# Patient Record
Sex: FEMALE | Race: BLACK OR AFRICAN AMERICAN | Employment: OTHER | URBAN - METROPOLITAN AREA
[De-identification: names, ages, dates, MRNs, and addresses within clinical notes are randomized per-mention and may not be internally consistent; named-entity substitution may affect disease eponyms.]

---

## 2017-01-24 ENCOUNTER — OFFICE VISIT (OUTPATIENT)
Dept: FAMILY MEDICINE CLINIC | Age: 72
End: 2017-01-24

## 2017-01-24 VITALS
HEART RATE: 82 BPM | HEIGHT: 63 IN | BODY MASS INDEX: 32.43 KG/M2 | RESPIRATION RATE: 16 BRPM | OXYGEN SATURATION: 98 % | TEMPERATURE: 97.6 F | WEIGHT: 183 LBS | DIASTOLIC BLOOD PRESSURE: 78 MMHG | SYSTOLIC BLOOD PRESSURE: 134 MMHG

## 2017-01-24 DIAGNOSIS — I10 ESSENTIAL HYPERTENSION: Primary | ICD-10-CM

## 2017-01-24 DIAGNOSIS — Z91.09 ENVIRONMENTAL ALLERGIES: ICD-10-CM

## 2017-01-24 DIAGNOSIS — Z51.81 ENCOUNTER FOR MEDICATION MONITORING: ICD-10-CM

## 2017-01-24 DIAGNOSIS — E78.2 MIXED HYPERLIPIDEMIA: ICD-10-CM

## 2017-01-24 DIAGNOSIS — E11.9 TYPE 2 DIABETES MELLITUS WITHOUT COMPLICATION, WITHOUT LONG-TERM CURRENT USE OF INSULIN (HCC): ICD-10-CM

## 2017-01-24 DIAGNOSIS — Z23 ENCOUNTER FOR IMMUNIZATION: ICD-10-CM

## 2017-01-24 LAB
BILIRUB UR QL STRIP: NEGATIVE
GLUCOSE POC: 251 MG/DL
GLUCOSE UR-MCNC: NORMAL MG/DL
HBA1C MFR BLD HPLC: 6.9 %
KETONES P FAST UR STRIP-MCNC: NEGATIVE MG/DL
PH UR STRIP: 5 [PH] (ref 4.6–8)
PROT UR QL STRIP: NEGATIVE MG/DL
SP GR UR STRIP: 1.02 (ref 1–1.03)
UA UROBILINOGEN AMB POC: NORMAL (ref 0.2–1)
URINALYSIS CLARITY POC: CLEAR
URINALYSIS COLOR POC: YELLOW
URINE BLOOD POC: NEGATIVE
URINE LEUKOCYTES POC: NEGATIVE
URINE NITRITES POC: NEGATIVE

## 2017-01-24 RX ORDER — FLUCONAZOLE 100 MG/1
TABLET ORAL
Refills: 0 | COMMUNITY
Start: 2017-01-19 | End: 2017-01-24 | Stop reason: ALTCHOICE

## 2017-01-24 RX ORDER — CETIRIZINE HCL 10 MG
10 TABLET ORAL
Qty: 30 TAB | Refills: 1
Start: 2017-01-24 | End: 2017-03-20

## 2017-01-24 RX ORDER — AMLODIPINE BESYLATE 10 MG/1
5 TABLET ORAL DAILY
COMMUNITY
End: 2019-05-06 | Stop reason: DRUGHIGH

## 2017-01-24 RX ORDER — FLUCONAZOLE 100 MG/1
100 TABLET ORAL DAILY
COMMUNITY
End: 2017-03-20 | Stop reason: ALTCHOICE

## 2017-01-24 NOTE — PROGRESS NOTES
HISTORY OF PRESENT ILLNESS  Cira Atwood is a 80 y.o. female. HPI   Follow up on BP, cholesterol and BS. Overall feeling well. HTN follow up:  Compliant w/ meds, low salt diet, and exercise. No home bp monitoring. No swelling, headache or dizziness. No chest pain, SOB, palpitations. Otherwise feeling well since the last visit. DM type II follow up:  Compliant w/ meds, diabetic diet, and exercise. Last a1c level was 6.5% in 12/15 and 6.8% in May 2016.  a1c was 7.1% in 10/16. Obtains home glucose monitoring averaging 100-140. Checks BS BID on most days and prn. Pt does not have BS log at visit today. No Rf needed for today. Denies any tingling sensation, polyuria and polydipsia. No blurred vision. No significant weight changes. Feeling well since last OV. Hypercholesterolemia follow up:  Compliant w/ low fat, low cholesterol diet. Exercising some. Currently not on medication. 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  fluconazole (DIFLUCAN) 100 mg tablet Take 100 mg by mouth daily. FDA advises cautious prescribing of oral fluconazole in pregnancy.  cetirizine (ZYRTEC) 10 mg tablet Take 1 Tab by mouth daily as needed for Allergies.  30 Tab 1    TRUE METRIX GLUCOSE TEST STRIP strip TEST BLOOD SUGAR TWICE DAILY  AND AS NEEDED 250 Strip 3    losartan-hydroCHLOROthiazide (HYZAAR) 100-12.5 mg per tablet TAKE 1 TABLET EVERY DAY 90 Tab 3    metFORMIN (GLUCOPHAGE) 500 mg tablet Take 2 tabs every morning and take 2 tabs with dinner 360 Tab 3    albuterol (PROVENTIL HFA, VENTOLIN HFA, PROAIR HFA) 90 mcg/actuation inhaler Take 2 Puffs by inhalation every four (4) hours as needed for Wheezing. 1 Inhaler 0    guaiFENesin-codeine (ROBITUSSIN AC) 100-10 mg/5 mL solution Take 10 mL by mouth three (3) times daily as needed for Cough. Max Daily Amount: 30 mL. 236 mL 0    meloxicam (MOBIC) 15 mg tablet Take 15 mg by mouth as needed for Pain.  aspirin 81 mg chewable tablet Take 81 mg by mouth daily.  loratadine (CLARITIN) 10 mg tablet Take 1 tablet by mouth daily as needed for Allergies. 30 tablet 11    Lancets (PRODIGY TWIST TOP LANCET) misc Use to check blood glucose level twice a day and PRN. 200 Each 3    alcohol swabs (BD SINGLE USE SWABS REGULAR) padm Use to cleanse finger prior to checking glucose level. 200 Each 3    Cholecalciferol, Vitamin D3, (VITAMIN D3) 5,000 unit Tab Take 1 Cap by mouth daily.  Omega-3-DHA-EPA-Fish Oil 1,000 (120-180) mg Cap Take 2 capsules by mouth daily.          No Known Allergies    Past Medical History   Diagnosis Date    Arthritis      knee    Cancer ([address] 2003     melanoma -right lower extremity    Chronic pain      left knee    Diabetes ([facility]      type 2    Hypertension     Nausea & vomiting     Screen for colon cancer 10/2/2014       Past Surgical History   Procedure Laterality Date    Hx wisdom teeth extraction      Hx hysterectomy  1990     fibroid    Pr colonoscopy flx dx w/collj spec when pfrmd  2007     date and md unknown    Hx knee replacement      Hx cholecystectomy      Hx other surgical  2005     permanent teeth replacements    Hx other surgical Right 2003     melonoma removed rt leg    Hx cataract removal  2010     bilateral eyes    Hx knee arthroscopy  1999     left knee replacement    Hx orthopaedic  9/11/2015     left wrist surgery    Hx breast biopsy Right      yrs ago       Family History   Problem Relation Age of Onset    No Known Problems Mother     No Known Problems Father     Thyroid Disease Sister        Social History   Substance Use Topics    Smoking status: Never Smoker    Smokeless tobacco: Never Used    Alcohol use No          Lab Results  Component Value Date/Time   WBC 5.7 10/22/2016 12:46 PM   Hemoglobin (POC) 15.3 12/27/2009 07:16 AM   HGB 13.3 10/22/2016 12:46 PM   Hematocrit (POC) 45 12/27/2009 07:16 AM   HCT 39.3 10/22/2016 12:46 PM   PLATELET 893 28/25/6924 12:46 PM   MCV 91.0 10/22/2016 12:46 PM       Lab Results  Component Value Date/Time   Cholesterol, total 198 05/23/2016 11:01 AM   HDL Cholesterol 68 05/23/2016 11:01 AM   LDL, calculated 109 05/23/2016 11:01 AM   Triglyceride 105 05/23/2016 11:01 AM   CHOL/HDL Ratio 4.3 12/27/2009 07:40 AM       Lab Results   Component Value Date/Time    Sodium 139 10/22/2016 12:46 PM    Potassium 3.9 10/24/2016 09:00 AM    Chloride 101 10/22/2016 12:46 PM    CO2 29 10/22/2016 12:46 PM    Anion gap 9 10/22/2016 12:46 PM    Glucose 231 10/22/2016 12:46 PM    BUN 10 10/22/2016 12:46 PM    Creatinine 0.69 10/22/2016 12:46 PM    BUN/Creatinine ratio 14 10/22/2016 12:46 PM    GFR est AA >60 10/22/2016 12:46 PM    GFR est non-AA >60 10/22/2016 12:46 PM    Calcium 8.6 10/22/2016 12:46 PM    Bilirubin, total 0.5 10/22/2016 12:46 PM    ALT 22 10/22/2016 12:46 PM    AST 18 10/22/2016 12:46 PM    Alk. phosphatase 73 10/22/2016 12:46 PM    Protein, total 7.9 10/22/2016 12:46 PM    Albumin 3.7 10/22/2016 12:46 PM    Globulin 4.2 10/22/2016 12:46 PM    A-G Ratio 0.9 10/22/2016 12:46 PM      Lab Results   Component Value Date/Time    Hemoglobin A1c 6.8 02/24/2014 09:00 AM    Hemoglobin A1c 7.0 05/11/2012 10:28 AM    Hemoglobin A1c (POC) 7.1 10/24/2016 09:00 AM         Review of Systems   Constitutional: Negative for malaise/fatigue. HENT: Negative for congestion. Eyes: Negative for blurred vision. Respiratory: Negative for cough and shortness of breath.     Cardiovascular: Negative for chest pain, palpitations and leg swelling. Gastrointestinal: Negative for abdominal pain, constipation and heartburn. Genitourinary: Negative for dysuria, frequency and urgency. Musculoskeletal: Negative for back pain and joint pain. Neurological: Negative for dizziness, tingling and headaches. Endo/Heme/Allergies: Positive for environmental allergies. Psychiatric/Behavioral: Negative for depression. The patient does not have insomnia. Physical Exam   Constitutional: She appears well-developed and well-nourished. /78  Pulse 82  Temp 97.6 °F (36.4 °C) (Oral)   Resp 16  Ht 5' 3\" (1.6 m)  Wt 183 lb (83 kg)  LMP 05/10/1990  SpO2 98%  BMI 32.42 kg/m2     HENT:   Right Ear: Tympanic membrane and ear canal normal.   Left Ear: Tympanic membrane and ear canal normal.   Nose: No mucosal edema or rhinorrhea. Mouth/Throat: Oropharynx is clear and moist and mucous membranes are normal.   Neck: Normal range of motion. Neck supple. No thyromegaly present. Cardiovascular: Normal rate and regular rhythm. No murmur heard. Pulmonary/Chest: Effort normal and breath sounds normal.   Abdominal: Soft. Bowel sounds are normal. There is no tenderness. Musculoskeletal: Normal range of motion. She exhibits no edema. Lymphadenopathy:     She has no cervical adenopathy. Skin: Skin is warm and dry. Psychiatric: She has a normal mood and affect. Nursing note and vitals reviewed. ASSESSMENT and PLAN  Cathie Thomas was seen today for hypertension, diabetes and nasal congestion. Diagnoses and all orders for this visit:    Essential hypertension  Discussed sodium restriction, high k rich diet, maintaining ideal body weight and regular exercise program such as daily walking 30 min perday 4-5 times per week, as physiologic means to achieve blood pressure control.  Medication compliance advised.      Type 2 diabetes mellitus without complication, without long-term current use of insulin (HCC)  A1C is improved to 6.9%.  but admits to having 2 cups of yogurt last night and some oranges she had cut up. Discussed diet and decreasing carbs with her snack.    -     AMB POC HEMOGLOBIN A1C  -     AMB POC GLUCOSE, QUANTITATIVE, BLOOD  -     MICROALBUMIN, UR, RAND W/ MICROALBUMIN/CREA RATIO  -     AMB POC URINALYSIS DIP STICK AUTO W/O MICRO    Mixed hyperlipidemia  -     LIPID PANEL    Encounter for medication monitoring  -     METABOLIC PANEL, BASIC    Environmental allergies  -     cetirizine (ZYRTEC) 10 mg tablet; Take 1 Tab by mouth daily as needed for Allergies. Follow-up Disposition:  Return in about 3 months (around 4/24/2017) for physical.  reviewed diet, exercise and weight control  cardiovascular risk and specific lipid/LDL goals reviewed  reviewed medications and side effects in detail  specific diabetic recommendations: low cholesterol diet, weight control and daily exercise discussed, home glucose monitoring emphasized, all medications, side effects and compliance discussed carefully, foot care discussed and Podiatry visits discussed, annual eye examinations at Ophthalmology discussed and glycohemoglobin and other lab monitoring discussed     I have discussed diagnosis listed in this note with pt and/or family. I have discussed treatment plans and options and the risk/benefit analysis of those options, including safe use of medications and possible medication side effects. Through the use of shared decision making we have agreed to the above plan. The patient has received an after-visit summary and questions were answered concerning future plans and follow up. Advise pt of any urgent changes then to proceed to the ER.

## 2017-01-24 NOTE — PROGRESS NOTES
Chief Complaint   Patient presents with    Hypertension     follow up    Diabetes     follow up    Nasal Congestion       Microalbumin 7/14/2015    Mammogram 8/31/2016    Colonoscopy 3/4/2016 by Dr. Birdie Eaton- repeat in 10 years. Pt states she has an eye exam 1/11/2016 with Dr. Chandrika Bragg. Verbal order received by Dr. Devine  dose flu vaccine IM. Pt received high dose flu vaccine IM in left deltoid without any difficulty.

## 2017-01-24 NOTE — MR AVS SNAPSHOT
Visit Information Date & Time Provider Department Dept. Phone Encounter #  
 1/24/2017  8:00 AM Marcheta Krabbe, MD [facility] [phone] [id number] Follow-up Instructions Return in about 3 months (around 4/24/2017) for physical.  
  
Upcoming Health Maintenance Date Due  
 EYE EXAM RETINAL OR DILATED Q1 10/9/2015 MICROALBUMIN Q1 7/14/2016 INFLUENZA AGE 9 TO ADULT 8/1/2016 MEDICARE YEARLY EXAM 12/22/2016 FOOT EXAM Q1 12/22/2016 HEMOGLOBIN A1C Q6M 4/24/2017 LIPID PANEL Q1 5/23/2017 GLAUCOMA SCREENING Q2Y 1/11/2018 BREAST CANCER SCRN MAMMOGRAM 8/31/2018 DTaP/Tdap/Td series (2 - Td) 2/10/2022 COLONOSCOPY 3/4/2026 Allergies as of 1/24/2017  Review Complete On: 1/24/2017 By: Dany Harrington LPN No Known Allergies Current Immunizations  Reviewed on 5/23/2016 Name Date Influenza High Dose Vaccine PF 12/22/2015 Influenza Vaccine 10/15/2014, 10/21/2013 Influenza Vaccine Split 12/17/2012 Pneumococcal Conjugate (PCV-13) 7/14/2015 Pneumococcal Vaccine (Unspecified Type) 2/10/2012 TDAP Vaccine 2/10/2012 Not reviewed this visit You Were Diagnosed With   
  
 Codes Comments Essential hypertension    -  Primary ICD-10-CM: I10 
ICD-9-CM: 401.9 Type 2 diabetes mellitus without complication, without long-term current use of insulin (HCC)     ICD-10-CM: E11.9 ICD-9-CM: 250.00 Mixed hyperlipidemia     ICD-10-CM: E78.2 ICD-9-CM: 272.2 Encounter for medication monitoring     ICD-10-CM: Z51.81 
ICD-9-CM: V58.83 Environmental allergies     ICD-10-CM: Z91.09 
ICD-9-CM: V15.09 Vitals BP Pulse Temp Resp Height(growth percentile) Weight(growth percentile) 134/78 82 97.6 °F (36.4 °C) (Oral) 16 5' 3\" (1.6 m) 183 lb (83 kg) LMP SpO2 BMI OB Status Smoking Status 05/10/1990 98% 32.42 kg/m2 Hysterectomy Never Smoker Vitals History BMI and BSA Data Body Mass Index Body Surface Area  
 32.42 kg/m 2 1.92 m 2 Preferred Pharmacy Pharmacy Name Phone [facility] [address]  [place] [phone] Your Updated Medication List  
  
   
This list is accurate as of: 1/24/17  9:15 AM.  Always use your most recent med list.  
  
  
  
  
 albuterol 90 mcg/actuation inhaler Commonly known as:  PROVENTIL HFA, VENTOLIN HFA, PROAIR HFA Take 2 Puffs by inhalation every four (4) hours as needed for Wheezing. alcohol swabs Padm Commonly known as:  BD Single Use Swabs Regular Use to cleanse finger prior to checking glucose level. aspirin 81 mg chewable tablet Take 81 mg by mouth daily. cetirizine 10 mg tablet Commonly known as:  ZYRTEC Take 1 Tab by mouth daily as needed for Allergies. CLARITIN 10 mg tablet Generic drug:  loratadine Take 1 tablet by mouth daily as needed for Allergies. fluconazole 100 mg tablet Commonly known as:  DIFLUCAN Take 100 mg by mouth daily. FDA advises cautious prescribing of oral fluconazole in pregnancy. guaiFENesin-codeine 100-10 mg/5 mL solution Commonly known as:  ROBITUSSIN AC Take 10 mL by mouth three (3) times daily as needed for Cough. Max Daily Amount: 30 mL. Lancets Misc Commonly known as:  PRODIGY TWIST TOP LANCET Use to check blood glucose level twice a day and PRN. losartan-hydroCHLOROthiazide 100-12.5 mg per tablet Commonly known as:  HYZAAR  
TAKE 1 TABLET EVERY DAY  
  
 meloxicam 15 mg tablet Commonly known as:  MOBIC Take 15 mg by mouth as needed for Pain.  
  
 metFORMIN 500 mg tablet Commonly known as:  GLUCOPHAGE Take 2 tabs every morning and take 2 tabs with dinner NORVASC 10 mg tablet Generic drug:  amLODIPine Take 5 mg by mouth daily. Omega-3-DHA-EPA-Fish Oil 1,000 mg (120 mg-180 mg) Cap Take 2 capsules by mouth daily. TRUE METRIX GLUCOSE TEST STRIP strip Generic drug:  glucose blood VI test strips TEST BLOOD SUGAR TWICE DAILY  AND AS NEEDED  
  
 VITAMIN D3 5,000 unit Tab tablet Generic drug:  cholecalciferol (VITAMIN D3) Take 1 Cap by mouth daily. We Performed the Following AMB POC GLUCOSE, QUANTITATIVE, BLOOD [37337 CPT(R)] AMB POC HEMOGLOBIN A1C [94452 CPT(R)] AMB POC URINALYSIS DIP STICK AUTO W/O MICRO [75805 CPT(R)] LIPID PANEL [58397 CPT(R)] METABOLIC PANEL, BASIC [62549 CPT(R)] MICROALBUMIN, UR, RAND W/ MICROALBUMIN/CREA RATIO [id number] CPT(R)] Follow-up Instructions Return in about 3 months (around 4/24/2017) for physical.  
  
  
Introducing [facility] & HE[facility] SERVICES! [employer] Gonzales introduces [employer] patient portal. Now you can access parts of your medical record, email your doctor's office, and request medication refills online. 1. In your internet browser, go to [email or web address] 2. Click on the First Time User? Click Here link in the Sign In box. You will see the New Member Sign Up page. 3. Enter your [employer] Access Code exactly as it appears below. You will not need to use this code after youve completed the sign-up process. If you do not sign up before the expiration date, you must request a new code. · [employer] Access Code: [email or web address] Expires: 4/24/2017  9:15 AM 
 
4. Enter the last four digits of your Social Security Number (xxxx) and Date of Birth (mm/dd/yyyy) as indicated and click Submit. You will be taken to the next sign-up page. 5. Create a Ins[employer]x ID. This will be your [employer] login ID and cannot be changed, so think of one that is secure and easy to remember. 6. Create a [employer] password. You can change your password at any time. 7. Enter your Password Reset Question and Answer. This can be used at a later time if you forget your password. 8. Enter your e-mail address.  You will receive e-mail notification when new information is available in [employer]. 9. Click Sign Up. You can now view and download portions of your medical record. 10. Click the Download Summary menu link to download a portable copy of your medical information. If you have questions, please visit the Frequently Asked Questions section of the [employer] website. Remember, [employer] is NOT to be used for urgent needs. For medical emergencies, dial 911. Now available from your iPhone and Android! Please provide this summary of care documentation to your next provider. Your primary care clinician is listed as [employer]. If you have any questions after today's visit, please call [phone].

## 2017-01-25 LAB
ALBUMIN/CREAT UR: 11.5 MG/G CREAT (ref 0–30)
BUN SERPL-MCNC: 12 MG/DL (ref 8–27)
BUN/CREAT SERPL: 20 (ref 11–26)
CALCIUM SERPL-MCNC: 9.9 MG/DL (ref 8.7–10.3)
CHLORIDE SERPL-SCNC: 99 MMOL/L (ref 96–106)
CHOLEST SERPL-MCNC: 210 MG/DL (ref 100–199)
CO2 SERPL-SCNC: 24 MMOL/L (ref 18–29)
CREAT SERPL-MCNC: 0.6 MG/DL (ref 0.57–1)
CREAT UR-MCNC: 80.3 MG/DL
GLUCOSE SERPL-MCNC: 250 MG/DL (ref 65–99)
HDLC SERPL-MCNC: 60 MG/DL
INTERPRETATION, 910389: NORMAL
LDLC SERPL CALC-MCNC: 127 MG/DL (ref 0–99)
MICROALBUMIN UR-MCNC: 9.2 UG/ML
POTASSIUM SERPL-SCNC: 4.3 MMOL/L (ref 3.5–5.2)
SODIUM SERPL-SCNC: 142 MMOL/L (ref 134–144)
TRIGL SERPL-MCNC: 114 MG/DL (ref 0–149)
VLDLC SERPL CALC-MCNC: 23 MG/DL (ref 5–40)

## 2017-03-20 ENCOUNTER — OFFICE VISIT (OUTPATIENT)
Dept: FAMILY MEDICINE CLINIC | Age: 72
End: 2017-03-20

## 2017-03-20 VITALS
BODY MASS INDEX: 32.67 KG/M2 | HEART RATE: 75 BPM | OXYGEN SATURATION: 96 % | WEIGHT: 184.4 LBS | TEMPERATURE: 98.6 F | HEIGHT: 63 IN | RESPIRATION RATE: 18 BRPM | SYSTOLIC BLOOD PRESSURE: 142 MMHG | DIASTOLIC BLOOD PRESSURE: 80 MMHG

## 2017-03-20 DIAGNOSIS — R52 BODY ACHES: Primary | ICD-10-CM

## 2017-03-20 DIAGNOSIS — B34.9 VIRAL SYNDROME: ICD-10-CM

## 2017-03-20 DIAGNOSIS — E78.2 MIXED HYPERLIPIDEMIA: ICD-10-CM

## 2017-03-20 DIAGNOSIS — R23.2 HOT FLASHES: ICD-10-CM

## 2017-03-20 DIAGNOSIS — I10 ESSENTIAL HYPERTENSION: ICD-10-CM

## 2017-03-20 DIAGNOSIS — Z51.81 ENCOUNTER FOR MEDICATION MONITORING: ICD-10-CM

## 2017-03-20 DIAGNOSIS — E11.9 TYPE 2 DIABETES MELLITUS WITHOUT COMPLICATION, WITHOUT LONG-TERM CURRENT USE OF INSULIN (HCC): ICD-10-CM

## 2017-03-20 LAB
BILIRUB UR QL STRIP: NEGATIVE
FLUAV+FLUBV AG NOSE QL IA.RAPID: NEGATIVE POS/NEG
FLUAV+FLUBV AG NOSE QL IA.RAPID: NEGATIVE POS/NEG
GLUCOSE POC: 278 MG/DL
GLUCOSE UR-MCNC: NORMAL MG/DL
KETONES P FAST UR STRIP-MCNC: NEGATIVE MG/DL
PH UR STRIP: 5 [PH] (ref 4.6–8)
PROT UR QL STRIP: NEGATIVE MG/DL
SP GR UR STRIP: 1.01 (ref 1–1.03)
UA UROBILINOGEN AMB POC: NORMAL (ref 0.2–1)
URINALYSIS CLARITY POC: CLEAR
URINALYSIS COLOR POC: YELLOW
URINE BLOOD POC: NEGATIVE
URINE LEUKOCYTES POC: NEGATIVE
URINE NITRITES POC: NEGATIVE
VALID INTERNAL CONTROL?: YES

## 2017-03-20 RX ORDER — ASPIRIN 81 MG/1
81 TABLET ORAL DAILY
Qty: 30 TAB | Refills: 6
Start: 2017-03-20 | End: 2017-05-01 | Stop reason: SDUPTHER

## 2017-03-20 NOTE — MR AVS SNAPSHOT
Visit Information Date & Time Provider Department Dept. Phone Encounter #  
 3/20/2017  9:30 AM Marnie Lundberg MD [facility] [phone] [id number] Follow-up Instructions Return in about 3 months (around 6/20/2017). Your Appointments 5/1/2017 10:15 AM  
COMPLETE PHYSICAL with Marnie Lundberg MD  
[facility] Appt Note: complete physical  
 [address] [facility] [address] [phone] [address] [facility] [address] Upcoming Health Maintenance Date Due  
 EYE EXAM RETINAL OR DILATED Q1 10/9/2015 MEDICARE YEARLY EXAM 12/22/2016 FOOT EXAM Q1 12/22/2016 HEMOGLOBIN A1C Q6M 7/24/2017 GLAUCOMA SCREENING Q2Y 1/11/2018 MICROALBUMIN Q1 1/24/2018 LIPID PANEL Q1 1/24/2018 BREAST CANCER SCRN MAMMOGRAM 8/31/2018 DTaP/Tdap/Td series (2 - Td) 2/10/2022 COLONOSCOPY 3/4/2026 Allergies as of 3/20/2017  Review Complete On: 3/20/2017 By: Marnie Lundberg MD  
 No Known Allergies Current Immunizations  Reviewed on 1/24/2017 Name Date Influenza High Dose Vaccine PF 1/24/2017, 12/22/2015 Influenza Vaccine 10/15/2014, 10/21/2013 Influenza Vaccine Split 12/17/2012 Pneumococcal Conjugate (PCV-13) 7/14/2015 Pneumococcal Vaccine (Unspecified Type) 2/10/2012 TDAP Vaccine 2/10/2012 Not reviewed this visit You Were Diagnosed With   
  
 Codes Comments Body aches    -  Primary ICD-10-CM: N82 ICD-9-CM: 780.96 Hot flashes     ICD-10-CM: R23.2 ICD-9-CM: 782.62 Type 2 diabetes mellitus without complication, without long-term current use of insulin (HCC)     ICD-10-CM: E11.9 ICD-9-CM: 250.00 Essential hypertension     ICD-10-CM: I10 
ICD-9-CM: 401.9 Encounter for medication monitoring     ICD-10-CM: Z51.81 
ICD-9-CM: V58.83 Viral syndrome     ICD-10-CM: B34.9 ICD-9-CM: 079.99 Vitals BP Pulse Temp Resp Height(growth percentile) Weight(growth percentile) 142/80 75 98.6 °F (37 °C) (Oral) 18 5' 3\" (1.6 m) 184 lb 6.4 oz (83.6 kg) LMP SpO2 BMI OB Status Smoking Status 05/10/1990 96% 32.66 kg/m2 Hysterectomy Never Smoker Vitals History BMI and BSA Data Body Mass Index Body Surface Area  
 32.66 kg/m 2 1.93 m 2 Preferred Pharmacy Pharmacy Name Phone [facility] [address]  [place] [phone] Your Updated Medication List  
  
   
This list is accurate as of: 3/20/17 11:06 AM.  Always use your most recent med list.  
  
  
  
  
 albuterol 90 mcg/actuation inhaler Commonly known as:  PROVENTIL HFA, VENTOLIN HFA, PROAIR HFA Take 2 Puffs by inhalation every four (4) hours as needed for Wheezing. alcohol swabs Padm Commonly known as:  BD Single Use Swabs Regular Use to cleanse finger prior to checking glucose level. aspirin 81 mg chewable tablet Take 81 mg by mouth daily. CLARITIN 10 mg tablet Generic drug:  loratadine Take 1 tablet by mouth daily as needed for Allergies. Lancets Misc Commonly known as:  PRODIGY TWIST TOP LANCET Use to check blood glucose level twice a day and PRN. losartan-hydroCHLOROthiazide 100-12.5 mg per tablet Commonly known as:  HYZAAR  
TAKE 1 TABLET EVERY DAY  
  
 meloxicam 15 mg tablet Commonly known as:  MOBIC Take 15 mg by mouth as needed for Pain.  
  
 metFORMIN 500 mg tablet Commonly known as:  GLUCOPHAGE Take 2 tabs every morning and take 2 tabs with dinner NORVASC 10 mg tablet Generic drug:  amLODIPine Take 5 mg by mouth daily. Omega-3-DHA-EPA-Fish Oil 1,000 mg (120 mg-180 mg) Cap Take 2 capsules by mouth daily. TRUE METRIX GLUCOSE TEST STRIP strip Generic drug:  glucose blood VI test strips TEST BLOOD SUGAR TWICE DAILY  AND AS NEEDED  
  
 VITAMIN D3 5,000 unit Tab tablet Generic drug:  cholecalciferol (VITAMIN D3) Take 1 Cap by mouth daily. We Performed the Following AMB POC COMPLETE CBC, AUTOMATED [09284 CPT(R)] AMB POC GLUCOSE, QUANTITATIVE, BLOOD [91590 CPT(R)] AMB POC NAMAN INFLUENZA A/B TEST [49450 CPT(R)] AMB POC URINALYSIS DIP STICK AUTO W/ MICRO [30050 CPT(R)] METABOLIC PANEL, BASIC [17962 CPT(R)] TSH 3RD GENERATION [19205 CPT(R)] Follow-up Instructions Return in about 3 months (around 6/20/2017). Introducing [facility] & [facility]! [employer] introduces [employer] patient portal. Now you can access parts of your medical record, email your doctor's office, and request medication refills online. 1. In your internet browser, go to [email or web address] 2. Click on the First Time User? Click Here link in the Sign In box. You will see the New Member Sign Up page. 3. Enter your Int[employer]Bridge Access Code exactly as it appears below. You will not need to use this code after youve completed the sign-up process. If you do not sign up before the expiration date, you must request a new code. · [employer] Access Code: [email or web address] Expires: 4/24/2017 10:15 AM 
 
4. Enter the last four digits of your Social Security Number (xxxx) and Date of Birth (mm/dd/yyyy) as indicated and click Submit. You will be taken to the next sign-up page. 5. Create a [employer] ID. This will be your Int[employer]Bridge login ID and cannot be changed, so think of one that is secure and easy to remember. 6. Create a IntroBridge password. You can change your password at any time. 7. Enter your Password Reset Question and Answer. This can be used at a later time if you forget your password. 8. Enter your e-mail address. You will receive e-mail notification when new information is available in [address] 9. Click Sign Up. You can now view and download portions of your medical record. 10. Click the Download Summary menu link to download a portable copy of your medical information. If you have questions, please visit the Frequently Asked Questions section of the [employer] website. Remember, [employer] is NOT to be used for urgent needs. For medical emergencies, dial 911. Now available from your iPhone and Android! Please provide this summary of care documentation to your next provider. Your primary care clinician is listed as Kiara Sow. If you have any questions after today's visit, please call [phone].

## 2017-03-20 NOTE — PROGRESS NOTES
HISTORY OF PRESENT ILLNESS  Maria Luz Dunn is a 80 y.o. female. HPI   C/o malaise and body aches for the past 2 weeks. Also some nasal congestion and dry cough. Has had fever and chills noted. Feels like hot flashes. Throat is scratchy. Has post nasal drainage. Intermittent headache. No chest pain or SOB. No wheezing noted. Has had some loose stools mostly in the mornings. No abd pain or nausea or vomiting. HTN follow up:  Compliant w/ meds, low salt diet, and exercise. No home bp monitoring. No swelling, headache or dizziness. No chest pain, SOB, palpitations. Otherwise feeling well since the last visit. DM type II follow up:  Compliant w/ meds, diabetic diet, and exercise. Last a1c level was 6.5% in 12/15 and 6.8% in May 2016.  a1c was 7.1% in 10/16. Obtains home glucose monitoring averaging 100-140. Checks BS BID on most days and prn. Pt does not have BS log at visit today. No Rf needed for today. Denies any tingling sensation, polyuria and polydipsia. No blurred vision. No significant weight changes. Feeling well since last OV. Hypercholesterolemia follow up:  Compliant w/ low fat, low cholesterol diet. Exercising some. Currently not on medication. 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       TRUE METRIX GLUCOSE TEST STRIP strip TEST BLOOD SUGAR TWICE DAILY  AND AS NEEDED 250 Strip 3    losartan-hydroCHLOROthiazide (HYZAAR) 100-12.5 mg per tablet TAKE 1 TABLET EVERY DAY 90 Tab 3  metFORMIN (GLUCOPHAGE) 500 mg tablet Take 2 tabs every morning and take 2 tabs with dinner 360 Tab 3    albuterol (PROVENTIL HFA, VENTOLIN HFA, PROAIR HFA) 90 mcg/actuation inhaler Take 2 Puffs by inhalation every four (4) hours as needed for Wheezing. 1 Inhaler 0    meloxicam (MOBIC) 15 mg tablet Take 15 mg by mouth as needed for Pain.  aspirin 81 mg chewable tablet Take 81 mg by mouth daily.  loratadine (CLARITIN) 10 mg tablet Take 1 tablet by mouth daily as needed for Allergies. 30 tablet 11    Lancets (PRODIGY TWIST TOP LANCET) misc Use to check blood glucose level twice a day and PRN. 200 Each 3    alcohol swabs (BD SINGLE USE SWABS REGULAR) padm Use to cleanse finger prior to checking glucose level. 200 Each 3    Cholecalciferol, Vitamin D3, (VITAMIN D3) 5,000 unit Tab Take 1 Cap by mouth daily.  Omega-3-DHA-EPA-Fish Oil 1,000 (120-180) mg Cap Take 2 capsules by mouth daily.          No Known Allergies    Past Medical History:   Diagnosis Date    Arthritis     knee    Cancer ([address] 2003    melanoma -right lower extremity    Chronic pain     left knee    Diabetes ([facility] [address]     type 2    Hypertension     Nausea & vomiting     Screen for colon cancer 10/2/2014       Past Surgical History:   Procedure Laterality Date    HX BREAST BIOPSY Right     yrs ago    HX CATARACT REMOVAL  2010    bilateral eyes    HX CHOLECYSTECTOMY      HX HYSTERECTOMY  1990    fibroid    HX KNEE ARTHROSCOPY  1999    left knee replacement    HX KNEE REPLACEMENT      HX ORTHOPAEDIC  9/11/2015    left wrist surgery    HX OTHER SURGICAL  2005    permanent teeth replacements    HX OTHER SURGICAL Right 2003    melonoma removed rt leg    HX WISDOM TEETH EXTRACTION      PA COLONOSCOPY FLX DX W/COLLJ SPEC WHEN PFRMD  2007    date and md unknown       Family History   Problem Relation Age of Onset    No Known Problems Mother     No Known Problems Father     Thyroid Disease Sister        Social History Substance Use Topics    Smoking status: Never Smoker    Smokeless tobacco: Never Used    Alcohol use No        Lab Results  Component Value Date/Time   WBC 5.7 10/22/2016 12:46 PM   Hemoglobin (POC) 15.3 12/27/2009 07:16 AM   HGB 13.3 10/22/2016 12:46 PM   Hematocrit (POC) 45 12/27/2009 07:16 AM   HCT 39.3 10/22/2016 12:46 PM   PLATELET 311 13/18/4174 12:46 PM   MCV 91.0 10/22/2016 12:46 PM       Lab Results  Component Value Date/Time   Cholesterol, total 210 01/24/2017 08:51 AM   HDL Cholesterol 60 01/24/2017 08:51 AM   LDL, calculated 127 01/24/2017 08:51 AM   Triglyceride 114 01/24/2017 08:51 AM   CHOL/HDL Ratio 4.3 12/27/2009 07:40 AM       Lab Results  Component Value Date/Time   TSH 2.020 07/30/2013 10:39 AM   TSH 1.710 02/10/2012 03:06 PM      Lab Results   Component Value Date/Time    Sodium 142 01/24/2017 08:51 AM    Potassium 4.3 01/24/2017 08:51 AM    Chloride 99 01/24/2017 08:51 AM    CO2 24 01/24/2017 08:51 AM    Anion gap 9 10/22/2016 12:46 PM    Glucose 250 01/24/2017 08:51 AM    BUN 12 01/24/2017 08:51 AM    Creatinine 0.60 01/24/2017 08:51 AM    BUN/Creatinine ratio 20 01/24/2017 08:51 AM    GFR est  01/24/2017 08:51 AM    GFR est non-AA 93 01/24/2017 08:51 AM    Calcium 9.9 01/24/2017 08:51 AM    Bilirubin, total 0.5 10/22/2016 12:46 PM    ALT (SGPT) 22 10/22/2016 12:46 PM    AST (SGOT) 18 10/22/2016 12:46 PM    Alk. phosphatase 73 10/22/2016 12:46 PM    Protein, total 7.9 10/22/2016 12:46 PM    Albumin 3.7 10/22/2016 12:46 PM    Globulin 4.2 10/22/2016 12:46 PM    A-G Ratio 0.9 10/22/2016 12:46 PM      Lab Results   Component Value Date/Time    Hemoglobin A1c 6.8 02/24/2014 09:00 AM    Hemoglobin A1c (POC) 6.9 01/24/2017 09:00 AM         ROS    Physical Exam   Constitutional: She appears well-developed and well-nourished.    /80  Pulse 75  Temp 98.6 °F (37 °C) (Oral)   Resp 18  Ht 5' 3\" (1.6 m)  Wt 184 lb 6.4 oz (83.6 kg)  LMP 05/10/1990  SpO2 96%  BMI 32.66 kg/m2     HENT: Right Ear: Tympanic membrane and ear canal normal.   Left Ear: Tympanic membrane and ear canal normal.   Nose: Mucosal edema and rhinorrhea present. Mouth/Throat: Mucous membranes are normal. Posterior oropharyngeal erythema present. No oropharyngeal exudate. Neck: Trachea normal, normal range of motion and full passive range of motion without pain. Neck supple. No edema present. No thyromegaly present. Cardiovascular: Normal rate and regular rhythm. Pulmonary/Chest: Effort normal and breath sounds normal. She has no wheezes. She has no rales. Abdominal: Soft. Normal appearance and bowel sounds are normal. There is no tenderness. Lymphadenopathy:     She has no cervical adenopathy. Nursing note and vitals reviewed. ASSESSMENT and PLAN  Ryder Odonnell was seen today for generalized body aches and nasal congestion. Diagnoses and all orders for this visit:    Body aches  -     AMB POC NAMAN INFLUENZA A/B TEST  -  Negative. Viral syndrome  -     AMB POC COMPLETE CBC, AUTOMATED  Rest and hydration. Advil as needed for the aching. Hot flashes  ?d/t fever. Will monitor.    -     TSH 3RD GENERATION    Essential hypertension  -     TSH 3RD GENERATION    Type 2 diabetes mellitus without complication, without long-term current use of insulin (HCC)  -     AMB POC GLUCOSE, QUANTITATIVE, BLOOD    Mixed hyperlipidemia  Continue to monitor. Work on diet and exercise. Encounter for medication monitoring  -     METABOLIC PANEL, BASIC  -     AMB POC URINALYSIS DIP STICK AUTO W/ MICRO      Follow-up Disposition:  Return in about 3 months (around 6/20/2017).   reviewed diet, exercise and weight control  cardiovascular risk and specific lipid/LDL goals reviewed  reviewed medications and side effects in detail  specific diabetic recommendations: low cholesterol diet, weight control and daily exercise discussed, home glucose monitoring emphasized, all medications, side effects and compliance discussed carefully and glycohemoglobin and other lab monitoring discussed     I have discussed diagnosis listed in this note with pt and/or family. I have discussed treatment plans and options and the risk/benefit analysis of those options, including safe use of medications and possible medication side effects. Through the use of shared decision making we have agreed to the above plan. The patient has received an after-visit summary and questions were answered concerning future plans and follow up. Advise pt of any urgent changes then to proceed to the ER.

## 2017-03-20 NOTE — PROGRESS NOTES
Chief Complaint   Patient presents with    Generalized Body Aches    Nasal Congestion     Pt stated she has been having symptoms for a few weeks now. Has been taking loratadine and has been helping with the body aches and congestion. Pt having nasal drainage now.     Discussed health maintenance with pt- pt stated had eye exam with Dr. Hanane Amor in 2/1/17

## 2017-03-21 LAB
BUN SERPL-MCNC: 13 MG/DL (ref 8–27)
BUN/CREAT SERPL: 24 (ref 11–26)
CALCIUM SERPL-MCNC: 9.7 MG/DL (ref 8.7–10.3)
CHLORIDE SERPL-SCNC: 97 MMOL/L (ref 96–106)
CO2 SERPL-SCNC: 26 MMOL/L (ref 18–29)
CREAT SERPL-MCNC: 0.55 MG/DL (ref 0.57–1)
GLUCOSE SERPL-MCNC: 268 MG/DL (ref 65–99)
POTASSIUM SERPL-SCNC: 3.8 MMOL/L (ref 3.5–5.2)
SODIUM SERPL-SCNC: 140 MMOL/L (ref 134–144)
TSH SERPL DL<=0.005 MIU/L-ACNC: 1.92 UIU/ML (ref 0.45–4.5)

## 2017-05-01 ENCOUNTER — OFFICE VISIT (OUTPATIENT)
Dept: FAMILY MEDICINE CLINIC | Age: 72
End: 2017-05-01

## 2017-05-01 VITALS
HEART RATE: 82 BPM | DIASTOLIC BLOOD PRESSURE: 86 MMHG | SYSTOLIC BLOOD PRESSURE: 160 MMHG | WEIGHT: 185.2 LBS | HEIGHT: 63 IN | OXYGEN SATURATION: 98 % | TEMPERATURE: 97.5 F | BODY MASS INDEX: 32.82 KG/M2 | RESPIRATION RATE: 16 BRPM

## 2017-05-01 DIAGNOSIS — Z51.81 ENCOUNTER FOR MEDICATION MONITORING: ICD-10-CM

## 2017-05-01 DIAGNOSIS — E78.2 MIXED HYPERLIPIDEMIA: ICD-10-CM

## 2017-05-01 DIAGNOSIS — I10 ESSENTIAL HYPERTENSION: ICD-10-CM

## 2017-05-01 DIAGNOSIS — Z51.81 ENCOUNTER FOR MONITORING ASPIRIN THERAPY: ICD-10-CM

## 2017-05-01 DIAGNOSIS — Z00.00 MEDICARE ANNUAL WELLNESS VISIT, SUBSEQUENT: Primary | ICD-10-CM

## 2017-05-01 DIAGNOSIS — Z12.11 ENCOUNTER FOR SCREENING FECAL OCCULT BLOOD TESTING: ICD-10-CM

## 2017-05-01 DIAGNOSIS — Z79.82 ENCOUNTER FOR MONITORING ASPIRIN THERAPY: ICD-10-CM

## 2017-05-01 DIAGNOSIS — E11.9 TYPE 2 DIABETES MELLITUS WITHOUT COMPLICATION, WITHOUT LONG-TERM CURRENT USE OF INSULIN (HCC): ICD-10-CM

## 2017-05-01 LAB
BILIRUB UR QL STRIP: NEGATIVE
GLUCOSE POC: 183 MG/DL
GLUCOSE UR-MCNC: NORMAL MG/DL
HBA1C MFR BLD HPLC: 7.1 %
HEMOCCULT STL QL: NEGATIVE
KETONES P FAST UR STRIP-MCNC: NEGATIVE MG/DL
PH UR STRIP: 5 [PH] (ref 4.6–8)
PROT UR QL STRIP: NEGATIVE MG/DL
SP GR UR STRIP: 1.02 (ref 1–1.03)
UA UROBILINOGEN AMB POC: NORMAL (ref 0.2–1)
URINALYSIS CLARITY POC: CLEAR
URINALYSIS COLOR POC: YELLOW
URINE BLOOD POC: NEGATIVE
URINE LEUKOCYTES POC: NEGATIVE
URINE NITRITES POC: NEGATIVE
VALID INTERNAL CONTROL?: YES

## 2017-05-01 RX ORDER — ASPIRIN 81 MG/1
81 TABLET ORAL DAILY
Qty: 30 TAB | Refills: 6
Start: 2017-05-01

## 2017-05-01 RX ORDER — FLUCONAZOLE 100 MG/1
TABLET ORAL
Refills: 1 | COMMUNITY
Start: 2017-03-22 | End: 2017-05-01 | Stop reason: ALTCHOICE

## 2017-05-01 NOTE — MR AVS SNAPSHOT
Visit Information Date & Time Provider Department Dept. Phone Encounter #  
 5/1/2017 10:15 AM Bernardo Stacy MD [facility] [phone] [id number] Follow-up Instructions Return in about 5 months (around 10/1/2017). Upcoming Health Maintenance Date Due  
 EYE EXAM RETINAL OR DILATED Q1 10/9/2015 FOOT EXAM Q1 12/22/2016 HEMOGLOBIN A1C Q6M 7/24/2017 INFLUENZA AGE 9 TO ADULT 8/1/2017 MICROALBUMIN Q1 1/24/2018 LIPID PANEL Q1 1/24/2018 MEDICARE YEARLY EXAM 5/2/2018 BREAST CANCER SCRN MAMMOGRAM 8/31/2018 GLAUCOMA SCREENING Q2Y 2/1/2019 DTaP/Tdap/Td series (2 - Td) 2/10/2022 COLONOSCOPY 3/4/2026 Allergies as of 5/1/2017  Review Complete On: 5/1/2017 By: Anjana oMss LPN No Known Allergies Current Immunizations  Reviewed on 3/20/2017 Name Date Influenza High Dose Vaccine PF 1/24/2017, 12/22/2015 Influenza Vaccine 10/15/2014, 10/21/2013 Influenza Vaccine Split 12/17/2012 Pneumococcal Conjugate (PCV-13) 7/14/2015 Pneumococcal Vaccine (Unspecified Type) 2/10/2012 TDAP Vaccine 2/10/2012 Not reviewed this visit You Were Diagnosed With   
  
 Codes Comments Essential hypertension    -  Primary ICD-10-CM: I10 
ICD-9-CM: 401.9 Type 2 diabetes mellitus without complication, without long-term current use of insulin (HCC)     ICD-10-CM: E11.9 ICD-9-CM: 250.00 Mixed hyperlipidemia     ICD-10-CM: E78.2 ICD-9-CM: 272.2 Encounter for medication monitoring     ICD-10-CM: Z51.81 
ICD-9-CM: V58.83 Medicare annual wellness visit, subsequent     ICD-10-CM: Z00.00 ICD-9-CM: V70.0 Encounter for screening fecal occult blood testing     ICD-10-CM: Z12.11 ICD-9-CM: V76.51 Encounter for monitoring aspirin therapy     ICD-10-CM: Z51.81, Z79.82 ICD-9-CM: V58.83, V58.66 Vitals BP Pulse Temp Resp Height(growth percentile) Weight(growth percentile) 175/90 (BP 1 Location: Left arm, BP Patient Position: Sitting) 82 97.5 °F (36.4 °C) (Oral) 16 5' 2.5\" (1.588 m) 185 lb 3.2 oz (84 kg) LMP SpO2 BMI OB Status Smoking Status 05/10/1990 98% 33.33 kg/m2 Hysterectomy Never Smoker Vitals History BMI and BSA Data Body Mass Index Body Surface Area  
 33.33 kg/m 2 1.92 m 2 Preferred Pharmacy Pharmacy Name Phone [facility] [address]  [place] [phone] Your Updated Medication List  
  
   
This list is accurate as of: 5/1/17 11:54 AM.  Always use your most recent med list.  
  
  
  
  
 albuterol 90 mcg/actuation inhaler Commonly known as:  PROVENTIL HFA, VENTOLIN HFA, PROAIR HFA Take 2 Puffs by inhalation every four (4) hours as needed for Wheezing. alcohol swabs Padm Commonly known as:  BD Single Use Swabs Regular Use to cleanse finger prior to checking glucose level. aspirin delayed-release 81 mg tablet Take 1 Tab by mouth daily. CLARITIN 10 mg tablet Generic drug:  loratadine Take 1 tablet by mouth daily as needed for Allergies. Lancets Misc Commonly known as:  PRODIGY TWIST TOP LANCET Use to check blood glucose level twice a day and PRN. losartan-hydroCHLOROthiazide 100-12.5 mg per tablet Commonly known as:  HYZAAR  
TAKE 1 TABLET EVERY DAY  
  
 meloxicam 15 mg tablet Commonly known as:  MOBIC Take 15 mg by mouth as needed for Pain.  
  
 metFORMIN 500 mg tablet Commonly known as:  GLUCOPHAGE Take 2 tabs every morning and take 2 tabs with dinner NORVASC 10 mg tablet Generic drug:  amLODIPine Take 5 mg by mouth daily. Omega-3-DHA-EPA-Fish Oil 1,000 mg (120 mg-180 mg) Cap Take 2 capsules by mouth daily. TRUE METRIX GLUCOSE TEST STRIP strip Generic drug:  glucose blood VI test strips TEST BLOOD SUGAR TWICE DAILY  AND AS NEEDED  
  
 VITAMIN D3 5,000 unit Tab tablet Generic drug:  cholecalciferol (VITAMIN D3) Take 1 Cap by mouth daily. We Performed the Following AMB POC FECAL BLOOD, OCCULT, QL 1 CARD [15330 CPT(R)] AMB POC GLUCOSE, QUANTITATIVE, BLOOD [07410 CPT(R)] AMB POC HEMOGLOBIN A1C [80840 CPT(R)] AMB POC URINALYSIS DIP STICK AUTO W/ MICRO [96719 CPT(R)] CBC W/O DIFF [19646 CPT(R)] LIPID PANEL [01696 CPT(R)] METABOLIC PANEL, COMPREHENSIVE [58157 CPT(R)] Follow-up Instructions Return in about 5 months (around 10/1/2017). Introducing [facility]! Pina Renteria introduces My [employer] patient portal. Now you can access parts of your medical record, email your doctor's office, and request medication refills online. 1. In your internet browser, go to [email or web address] 2. Click on the First Time User? Click Here link in the Sign In box. You will see the New Member Sign Up page. 3. Enter your My Visual Brief Access Code exactly as it appears below. You will not need to use this code after youve completed the sign-up process. If you do not sign up before the expiration date, you must request a new code. · My [employer] Access Code: [email or web address] Expires: 7/30/2017 11:54 AM 
 
4. Enter the last four digits of your Social Security Number (xxxx) and Date of Birth (mm/dd/yyyy) as indicated and click Submit. You will be taken to the next sign-up page. 5. Create a [employer] ID. This will be your My [employer] login ID and cannot be changed, so think of one that is secure and easy to remember. 6. Create a My [employer] password. You can change your password at any time. 7. Enter your Password Reset Question and Answer. This can be used at a later time if you forget your password. 8. Enter your e-mail address. You will receive e-mail notification when new information is available in [address] 9. Click Sign Up. You can now view and download portions of your medical record. 10. Click the Download Summary menu link to download a portable copy of your medical information. If you have questions, please visit the Frequently Asked Questions section of the [employer] website. Remember, [employer] is NOT to be used for urgent needs. For medical emergencies, dial 911. Now available from your iPhone and Android! Please provide this summary of care documentation to your next provider. Your primary care clinician is listed as Danie Garvey. If you have any questions after today's visit, please call [phone].

## 2017-05-01 NOTE — PROGRESS NOTES
This is a Subsequent Medicare Annual Wellness Visit providing Personalized Prevention Plan Services (PPPS) (Performed 12 months after initial AWV and PPPS )    I have reviewed the patient's medical history in detail and updated the computerized patient record. HTN follow up:  Compliant w/ meds, low salt diet, and exercise. No home bp monitoring. No swelling, headache or dizziness. No chest pain, SOB, palpitations. Otherwise feeling well since the last visit. DM type II follow up:  Compliant w/ meds, diabetic diet, and exercise. Last a1c level was 6.5% in 12/15 and 6.8% in May 2016.  a1c was 7.1% in 10/16. Obtains home glucose monitoring averaging 100-140. Checks BS BID on most days and prn. Pt does not have BS log at visit today. No Rf needed for today. Denies any tingling sensation, polyuria and polydipsia. No blurred vision. No significant weight changes. Feeling well since last OV. Hypercholesterolemia follow up:  Compliant w/ low fat, low cholesterol diet. Exercising some. Currently not on medication.        History     Past Medical History:   Diagnosis Date    Arthritis     knee    Cancer ([facility] [address] 2003    melanoma -right lower extremity    Chronic pain     left knee    Diabetes ([facility] [address]     type 2    Hypertension     Nausea & vomiting     Screen for colon cancer 10/2/2014      Past Surgical History:   Procedure Laterality Date    HX BREAST BIOPSY Right     yrs ago    HX CATARACT REMOVAL  2010    bilateral eyes    HX CHOLECYSTECTOMY      HX HYSTERECTOMY  1990    fibroid    HX KNEE ARTHROSCOPY  1999    left knee replacement    HX ORTHOPAEDIC      age 40- right foot    HX ORTHOPAEDIC  9/11/2015    left wrist surgery    HX OTHER SURGICAL  2005    permanent teeth replacements    HX OTHER SURGICAL Right 2003    melonoma removed rt leg    HX WISDOM TEETH EXTRACTION      [place] COLONOSCOPY FLX DX W/COLLJ SPEC WHEN PFRMD  2007    date and md unknown     Current Outpatient Prescriptions Medication Sig Dispense Refill    aspirin delayed-release 81 mg tablet Take 1 Tab by mouth daily. 30 Tab 6    amLODIPine (NORVASC) 10 mg tablet Take 5 mg by mouth daily.  TRUE METRIX GLUCOSE TEST STRIP strip TEST BLOOD SUGAR TWICE DAILY  AND AS NEEDED 250 Strip 3    losartan-hydroCHLOROthiazide (HYZAAR) 100-12.5 mg per tablet TAKE 1 TABLET EVERY DAY 90 Tab 3    metFORMIN (GLUCOPHAGE) 500 mg tablet Take 2 tabs every morning and take 2 tabs with dinner 360 Tab 3    albuterol (PROVENTIL HFA, VENTOLIN HFA, PROAIR HFA) 90 mcg/actuation inhaler Take 2 Puffs by inhalation every four (4) hours as needed for Wheezing. 1 Inhaler 0    meloxicam (MOBIC) 15 mg tablet Take 15 mg by mouth as needed for Pain.  loratadine (CLARITIN) 10 mg tablet Take 1 tablet by mouth daily as needed for Allergies. 30 tablet 11    Lancets (PRODIGY TWIST TOP LANCET) misc Use to check blood glucose level twice a day and PRN. 200 Each 3    alcohol swabs (BD SINGLE USE SWABS REGULAR) padm Use to cleanse finger prior to checking glucose level. 200 Each 3    Cholecalciferol, Vitamin D3, (VITAMIN D3) 5,000 unit Tab Take 1 Cap by mouth daily.  Omega-3-DHA-EPA-Fish Oil 1,000 (120-180) mg Cap Take 2 capsules by mouth daily.        No Known Allergies  Family History   Problem Relation Age of Onset    No Known Problems Mother     No Known Problems Father     Thyroid Disease Sister      Social History   Substance Use Topics    Smoking status: Never Smoker    Smokeless tobacco: Never Used    Alcohol use No     Patient Active Problem List   Diagnosis Code    Medial meniscus tear S83.249A    Right knee DJD M17.11    Arthritis M19.90    Cancer ([facility] [address] C80.1    Chronic pain G89.29    Hyperlipidemia E78.5    Hypovitaminosis D E55.9    Screen for colon cancer Z12.11    Type 2 diabetes mellitus without complication ([facility] [address] E75.2    Essential hypertension I10    Encounter for medication monitoring Z51.81       Depression Risk Factor Screening:     PHQ 2 / 9, over the last two weeks 5/1/2017   Little interest or pleasure in doing things Not at all   Feeling down, depressed or hopeless Not at all   Total Score PHQ 2 0     Alcohol Risk Factor Screening: On any occasion during the past 3 months, have you had more than 3 drinks containing alcohol? No    Do you average more than 7 drinks per week? No      Functional Ability and Level of Safety:     Hearing Loss   none    Activities of Daily Living   Self-care. Requires assistance with: no ADLs    Fall Risk     Fall Risk Assessment, last 12 mths 5/1/2017   Able to walk? Yes   Fall in past 12 months? No     Functional Ability:   Does the patient exhibit a steady gait? yes    How long did it take the patient to get up and walk from a sitting position? seconds    Is the patient self reliant? (ie can do own laundry, meals, household chores)  yes   Does the patient handle his/her own medications? yes   Does the patient handle his/her own money? yes   Is the patients home safe (ie good lighting, handrails on stairs and bath, etc.)? yes   Did you notice or did patient express any hearing difficulties? no   Did you notice or did patient express any vision difficulties?  no   Were distance and reading eye charts used? yes     Advance Care Planning:   Patient was offered the opportunity to discuss advance care planning:  yes    Does patient have an Advance Directive:  yes    If no, did you provide information on Caring Connections?   yes          Abuse Screen   Patient is not abused    Review of Systems   Constitutional: negative for fatigue and malaise  Eyes: negative for irritation and redness  Ears, nose, mouth, throat, and face: negative for earaches, nasal congestion and hoarseness  Respiratory: negative for cough, sputum or dyspnea on exertion  Cardiovascular: negative for chest pain, chest pressure/discomfort, dyspnea, palpitations, irregular heart beats, fatigue, lower extremity edema  Gastrointestinal: negative for dysphagia, dyspepsia, reflux symptoms, nausea, vomiting, change in bowel habits, melena, constipation and abdominal pain  Genitourinary:negative for frequency, dysuria, nocturia, urinary incontinence   Integument/breast: negative for rash and dryness  Hematologic/lymphatic: negative for easy bruising and lymphadenopathy  Musculoskeletal:negative for myalgias, arthralgias, stiff joints, neck pain, back pain and muscle weakness  Neurological: negative for headaches, dizziness, tremor and weakness  Endocrine: negative for diabetic symptoms including polyuria and polydipsia    Physical Examination     Evaluation of Cognitive Function:  Mood/affect:  happy  Appearance: age appropriate  Family member/caregiver input: NA    Visit Vitals    /86    Pulse 82    Temp 97.5 °F (36.4 °C) (Oral)    Resp 16    Ht 5' 2.5\" (1.588 m)    Wt 185 lb 3.2 oz (84 kg)    LMP 05/10/1990    SpO2 98%    BMI 33.33 kg/m2     General:  Alert, cooperative, no distress, appears stated age. Head:  Normocephalic, without obvious abnormality, atraumatic. Eyes:  Conjunctivae/corneas clear. PERRL, EOMs intact. Fundi benign. Ears:  Normal TMs and external ear canals both ears. Nose: Nares normal. Septum midline. Mucosa normal. No drainage or sinus tenderness. Throat: Lips, mucosa, and tongue normal. Teeth and gums normal.   Neck: Supple, symmetrical, trachea midline, no adenopathy, thyroid: no enlargement/tenderness/nodules, no carotid bruit and no JVD. Back:   Symmetric, no curvature. ROM normal. No CVA tenderness. Lungs:   Clear to auscultation bilaterally. Chest wall:  No tenderness or deformity. Heart:  Regular rate and rhythm, S1, S2 normal, no murmur, click, rub or gallop. Breast Exam:  No tenderness, masses, or nipple abnormality. Abdomen:   Soft, non-tender. Bowel sounds normal. No masses,  No organomegaly.        Rectal:  Normal tone,  no masses or tenderness  Guaiac negative stool. Extremities: Extremities normal, atraumatic, no cyanosis or edema. Pulses: 2+ and symmetric all extremities. Skin: Skin color, texture, turgor normal. No rashes or lesions. Lymph nodes: Cervical, supraclavicular, and axillary nodes normal.   Neurologic: CNII-XII intact. Normal strength, sensation and reflexes throughout. Patient Care Team:  Karina Delgado MD as PCP - [facility])    Advice/Referrals/Counseling   Education and counseling provided:  Are appropriate based on today's review and evaluation  End-of-Life planning (with patient's consent)      Assessment/Plan   Eryn Triana was seen today for annual wellness visit. Diagnoses and all orders for this visit:    Medicare annual wellness visit, subsequent  -     AMB POC URINALYSIS DIP STICK AUTO W/ MICRO    Essential hypertension  Discussed sodium restriction, high k rich diet, maintaining ideal body weight and regular exercise program such as daily walking 30 min perday 4-5 times per week, as physiologic means to achieve blood pressure control.  Medication compliance advised. Type 2 diabetes mellitus without complication, without long-term current use of insulin (HCC)  -     AMB POC GLUCOSE, QUANTITATIVE, BLOOD  -     AMB POC HEMOGLOBIN A1C    Mixed hyperlipidemia  -     LIPID PANEL    Encounter for medication monitoring  -     METABOLIC PANEL, COMPREHENSIVE  -     CBC W/O DIFF    Encounter for screening fecal occult blood testing  -     AMB POC FECAL BLOOD, OCCULT, QL 1 CARD    Encounter for monitoring aspirin therapy  -     AMB POC FECAL BLOOD, OCCULT, QL 1 CARD    Other orders  -     aspirin delayed-release 81 mg tablet; Take 1 Tab by mouth daily.       Follow-up Disposition:  Return in about 2 months  reviewed diet, exercise and weight control  cardiovascular risk and specific lipid/LDL goals reviewed  reviewed medications and side effects in detail  specific diabetic recommendations: low cholesterol diet, weight control and daily exercise discussed, home glucose monitoring emphasized and glycohemoglobin and other lab monitoring discussed. I have discussed diagnosis listed in this note with pt and/or family. I have discussed treatment plans and options and the risk/benefit analysis of those options, including safe use of medications and possible medication side effects. Through the use of shared decision making we have agreed to the above plan. The patient has received an after-visit summary and questions were answered concerning future plans and follow up. Advise pt of any urgent changes then to proceed to the ER.

## 2017-05-01 NOTE — PROGRESS NOTES
Chief Complaint   Patient presents with   [facility] Annual Wellness Visit     Medicare     BMP 3/20/2017    TSH 3/20/2017    Lipid 3/20/2017    Microalbumin 1/24/2017    Mammogram 8/31/2016    Colonoscopy 3/4/2016 by Dr. Nika Shipley- repeat in 10 years. Pt states she has an eye exam 2/1/2017 with Dr. Stephy Singh.       Verbal order received per Dr. Maier- obtain UA without micro- VORB

## 2017-05-02 LAB
ALBUMIN SERPL-MCNC: 4.7 G/DL (ref 3.5–4.8)
ALBUMIN/GLOB SERPL: 1.8 {RATIO} (ref 1.2–2.2)
ALP SERPL-CCNC: 68 IU/L (ref 39–117)
ALT SERPL-CCNC: 10 IU/L (ref 0–32)
AST SERPL-CCNC: 12 IU/L (ref 0–40)
BILIRUB SERPL-MCNC: 0.3 MG/DL (ref 0–1.2)
BUN SERPL-MCNC: 12 MG/DL (ref 8–27)
BUN/CREAT SERPL: 26 (ref 12–28)
CALCIUM SERPL-MCNC: 9.9 MG/DL (ref 8.7–10.3)
CHLORIDE SERPL-SCNC: 98 MMOL/L (ref 96–106)
CHOLEST SERPL-MCNC: 209 MG/DL (ref 100–199)
CO2 SERPL-SCNC: 24 MMOL/L (ref 18–29)
CREAT SERPL-MCNC: 0.46 MG/DL (ref 0.57–1)
ERYTHROCYTE [DISTWIDTH] IN BLOOD BY AUTOMATED COUNT: 13.5 % (ref 12.3–15.4)
GLOBULIN SER CALC-MCNC: 2.6 G/DL (ref 1.5–4.5)
GLUCOSE SERPL-MCNC: 178 MG/DL (ref 65–99)
HCT VFR BLD AUTO: 40.6 % (ref 34–46.6)
HDLC SERPL-MCNC: 71 MG/DL
HGB BLD-MCNC: 14 G/DL (ref 11.1–15.9)
INTERPRETATION, 910389: NORMAL
LDLC SERPL CALC-MCNC: 119 MG/DL (ref 0–99)
MCH RBC QN AUTO: 31.1 PG (ref 26.6–33)
MCHC RBC AUTO-ENTMCNC: 34.5 G/DL (ref 31.5–35.7)
MCV RBC AUTO: 90 FL (ref 79–97)
PLATELET # BLD AUTO: 252 X10E3/UL (ref 150–379)
POTASSIUM SERPL-SCNC: 3.5 MMOL/L (ref 3.5–5.2)
PROT SERPL-MCNC: 7.3 G/DL (ref 6–8.5)
RBC # BLD AUTO: 4.5 X10E6/UL (ref 3.77–5.28)
SODIUM SERPL-SCNC: 142 MMOL/L (ref 134–144)
TRIGL SERPL-MCNC: 97 MG/DL (ref 0–149)
VLDLC SERPL CALC-MCNC: 19 MG/DL (ref 5–40)
WBC # BLD AUTO: 3.6 X10E3/UL (ref 3.4–10.8)

## 2017-06-09 ENCOUNTER — OFFICE VISIT (OUTPATIENT)
Dept: FAMILY MEDICINE CLINIC | Age: 72
End: 2017-06-09

## 2017-06-09 VITALS
WEIGHT: 181 LBS | TEMPERATURE: 98.3 F | OXYGEN SATURATION: 97 % | DIASTOLIC BLOOD PRESSURE: 80 MMHG | SYSTOLIC BLOOD PRESSURE: 138 MMHG | BODY MASS INDEX: 32.07 KG/M2 | HEART RATE: 75 BPM | HEIGHT: 63 IN | RESPIRATION RATE: 19 BRPM

## 2017-06-09 DIAGNOSIS — J06.9 UPPER RESPIRATORY TRACT INFECTION, UNSPECIFIED TYPE: Primary | ICD-10-CM

## 2017-06-09 DIAGNOSIS — Z91.09 ENVIRONMENTAL ALLERGIES: ICD-10-CM

## 2017-06-09 RX ORDER — AZITHROMYCIN 250 MG/1
TABLET, FILM COATED ORAL
Qty: 6 TAB | Refills: 0 | Status: SHIPPED | OUTPATIENT
Start: 2017-06-09 | End: 2017-06-14

## 2017-06-09 RX ORDER — PROMETHAZINE HYDROCHLORIDE AND CODEINE PHOSPHATE 6.25; 1 MG/5ML; MG/5ML
1 SOLUTION ORAL
Qty: 180 ML | Refills: 0 | Status: SHIPPED | OUTPATIENT
Start: 2017-06-09 | End: 2018-01-16

## 2017-06-09 RX ORDER — FLUTICASONE PROPIONATE 50 MCG
2 SPRAY, SUSPENSION (ML) NASAL
COMMUNITY
End: 2017-06-27 | Stop reason: SDUPTHER

## 2017-06-09 NOTE — PROGRESS NOTES
HISTORY OF PRESENT ILLNESS  Bernie Garcia is a 71 y.o. female. HPI   C/o nasal congestion and cough for the past 2 weeks. Coughing up yellowish phlegm. No fever or chills noted. Has malaise. Throat is scratchy. Has post nasal drainage. No chest pain or SOB. No wheezing noted. Feeling worn out from her sx. Patient Active Problem List   Diagnosis Code    Medial meniscus tear S83.249A    Right knee DJD M17.11    Arthritis M19.90    Cancer ([facility] [address] C80.1    Chronic pain G89.29    Hyperlipidemia E78.5    Hypovitaminosis D E55.9    Screen for colon cancer Z12.11    Type [facility] B33.0    Essential hypertension I10    Encounter for medication monitoring Z51.81       Current Outpatient Prescriptions   Medication Sig Dispense Refill    aspirin delayed-release 81 mg tablet Take 1 Tab by mouth daily. 30 Tab 6    amLODIPine (NORVASC) 10 mg tablet Take 5 mg by mouth daily.  TRUE METRIX GLUCOSE TEST STRIP strip TEST BLOOD SUGAR TWICE DAILY  AND AS NEEDED 250 Strip 3    losartan-hydroCHLOROthiazide (HYZAAR) 100-12.5 mg per tablet TAKE 1 TABLET EVERY DAY 90 Tab 3    metFORMIN (GLUCOPHAGE) 500 mg tablet Take 2 tabs every morning and take 2 tabs with dinner 360 Tab 3    albuterol (PROVENTIL HFA, VENTOLIN HFA, PROAIR HFA) 90 mcg/actuation inhaler Take 2 Puffs by inhalation every four (4) hours as needed for Wheezing. 1 Inhaler 0    meloxicam (MOBIC) 15 mg tablet Take 15 mg by mouth as needed for Pain.  loratadine (CLARITIN) 10 mg tablet Take 1 tablet by mouth daily as needed for Allergies. 30 tablet 11    Lancets (PRODIGY TWIST TOP LANCET) misc Use to check blood glucose level twice a day and PRN. 200 Each 3    alcohol swabs (BD SINGLE USE SWABS REGULAR) padm Use to cleanse finger prior to checking glucose level. 200 Each 3    Cholecalciferol, Vitamin D3, (VITAMIN D3) 5,000 unit Tab Take 1 Cap by mouth daily.         Omega-3-DHA-EPA-Fish Oil 1,000 (120-180) mg Cap Take 2 capsules by mouth daily. No Known Allergies    Past Medical History:   Diagnosis Date    Arthritis     knee    Cancer ([facility] [address] 2003    melanoma -right lower extremity    Chronic pain     left knee    Diabetes ([facility] [address]     type 2    Hypertension     Nausea & vomiting     Screen for colon cancer 10/2/2014       Past Surgical History:   Procedure Laterality Date    HX BREAST BIOPSY Right     yrs ago    HX CATARACT REMOVAL  2010    bilateral eyes    HX CHOLECYSTECTOMY      HX HYSTERECTOMY  1990    fibroid    HX KNEE ARTHROSCOPY  1999    left knee replacement    HX ORTHOPAEDIC      age 40- right foot    HX ORTHOPAEDIC  9/11/2015    left wrist surgery    HX OTHER SURGICAL  2005    permanent teeth replacements    HX OTHER SURGICAL Right 2003    melonoma removed rt leg    HX WISDOM TEETH EXTRACTION      [place] COLONOSCOPY FLX DX W/COLLJ SPEC WHEN PFRMD  2007    date and md unknown       Family History   Problem Relation Age of Onset    No Known Problems Mother     No Known Problems Father     Thyroid Disease Sister        Social History   Substance Use Topics    Smoking status: Never Smoker    Smokeless tobacco: Never Used    Alcohol use No        ROS    Physical Exam   Constitutional: She appears well-developed and well-nourished. /80 (BP 1 Location: Left arm, BP Patient Position: Sitting)  Pulse 75  Temp 98.3 °F (36.8 °C) (Oral)   Resp 19  Ht 5' 2.5\" (1.588 m)  Wt 181 lb (82.1 kg)  LMP 05/10/1990  SpO2 97%   L/min  BMI 32.58 kg/m2     HENT:   Right Ear: Tympanic membrane and ear canal normal.   Left Ear: Tympanic membrane and ear canal normal.   Nose: Mucosal edema and rhinorrhea present. Mouth/Throat: Mucous membranes are normal. Posterior oropharyngeal erythema present. No oropharyngeal exudate. Neck: Trachea normal, normal range of motion and full passive range of motion without pain. Neck supple. No edema present. No thyromegaly present. Cardiovascular: Normal rate and regular rhythm. Pulmonary/Chest: Effort normal and breath sounds normal. She has no wheezes. She has no rales. Abdominal: Soft. Normal appearance and bowel sounds are normal. There is no tenderness. Lymphadenopathy:     She has no cervical adenopathy. Vitals reviewed. ASSESSMENT and PLAN  Axel Narvaez was seen today for nasal congestion and cough. Diagnoses and all orders for this visit:    Upper respiratory tract infection, unspecified type  -     azithromycin (ZITHROMAX) 250 mg tablet; Take 2 tablets today, then take 1 tablet daily  -     promethazine-codeine (PHENERGAN WITH CODEINE) 6.25-10 mg/5 mL syrup; Take 5 mL by mouth every six (6) hours as needed for Cough. Max Daily Amount: 20 mL. Environmental allergies  Continue with Flonase        Follow-up Disposition:  Return if symptoms worsen or fail to improve.

## 2017-06-09 NOTE — MR AVS SNAPSHOT
Visit Information Date & Time Provider Department Dept. Phone Encounter #  
 6/9/2017  2:45 PM Veronica BeltranMack [phone] [id number] Follow-up Instructions Return if symptoms worsen or fail to improve. Your Appointments 6/27/2017  7:30 AM  
ROUTINE CARE with Veronica Beltran MD  
[address]) Appt Note: F/U BP per Dr. Ron Ibarra [address] [facility] [address] [id number]  
[phone]  
  
    
 9/5/2017  8:45 AM  
ROUTINE CARE with Veronica Beltran MD  
[address]) Appt Note: 4m f/u  
 [address] [facility] [address] [id number] [phone] Upcoming Health Maintenance Date Due  
 EYE EXAM RETINAL OR DILATED Q1 10/9/2015 INFLUENZA AGE 9 TO ADULT 8/1/2017 HEMOGLOBIN A1C Q6M 11/1/2017 MICROALBUMIN Q1 1/24/2018 FOOT EXAM Q1 5/1/2018 LIPID PANEL Q1 5/1/2018 MEDICARE YEARLY EXAM 5/2/2018 BREAST CANCER SCRN MAMMOGRAM 8/31/2018 GLAUCOMA SCREENING Q2Y 2/1/2019 DTaP/Tdap/Td series (2 - Td) 2/10/2022 COLONOSCOPY 3/4/2026 Allergies as of 6/9/2017  Review Complete On: 6/9/2017 By: Shaila Monte LPN No Known Allergies Current Immunizations  Reviewed on 5/1/2017 Name Date Influenza High Dose Vaccine PF 1/24/2017, 12/22/2015 Influenza Vaccine 10/15/2014, 10/21/2013 Influenza Vaccine Split 12/17/2012 Pneumococcal Conjugate (PCV-13) 7/14/2015 Pneumococcal Vaccine (Unspecified Type) 2/10/2012 TDAP Vaccine 2/10/2012 Not reviewed this visit You Were Diagnosed With   
  
 Codes Comments Upper respiratory tract infection, unspecified type    -  Primary ICD-10-CM: J06.9 ICD-9-CM: 465.9 Vitals BP Pulse Temp Resp Height(growth percentile) Weight(growth percentile) 180/83 (BP 1 Location: Left arm, BP Patient Position: Sitting) 75 98.3 °F (36.8 °C) (Oral) 19 5' 2.5\" (1.588 m) 181 lb (82.1 kg) LMP SpO2 PF BMI OB Status Smoking Status 05/10/1990 97% 300 L/min 32.58 kg/m2 Hysterectomy Never Smoker Vitals History BMI and BSA Data Body Mass Index Body Surface Area 32.58 kg/m 2 1.9 m 2 Preferred Pharmacy Pharmacy Name Phone [facility] [address]  [place] [phone] Your Updated Medication List  
  
   
This list is accurate as of: 6/9/17  3:07 PM.  Always use your most recent med list.  
  
  
  
  
 albuterol 90 mcg/actuation inhaler Commonly known as:  PROVENTIL HFA, VENTOLIN HFA, PROAIR HFA Take 2 Puffs by inhalation every four (4) hours as needed for Wheezing. alcohol swabs Padm Commonly known as:  BD Single Use Swabs Regular Use to cleanse finger prior to checking glucose level. aspirin delayed-release 81 mg tablet Take 1 Tab by mouth daily. azithromycin 250 mg tablet Commonly known as:  Nicole [place] Take 2 tablets today, then take 1 tablet daily CLARITIN 10 mg tablet Generic drug:  loratadine Take 1 tablet by mouth daily as needed for Allergies. FLONASE 50 mcg/actuation nasal spray Generic drug:  fluticasone 2 Sprays by Both Nostrils route daily as needed for Rhinitis. Lancets Misc Commonly known as:  PRODIGY TWIST TOP LANCET Use to check blood glucose level twice a day and PRN. losartan-hydroCHLOROthiazide 100-12.5 mg per tablet Commonly known as:  HYZAAR  
TAKE 1 TABLET EVERY DAY  
  
 meloxicam 15 mg tablet Commonly known as:  MOBIC Take 15 mg by mouth as needed for Pain.  
  
 metFORMIN 500 mg tablet Commonly known as:  GLUCOPHAGE Take 2 tabs every morning and take 2 tabs with dinner NORVASC 10 mg tablet Generic drug:  amLODIPine Take 5 mg by mouth daily. Omega-3-DHA-EPA-Fish Oil 1,000 mg (120 mg-180 mg) Cap Take 2 Caps by mouth every seven (7) days. promethazine-codeine 6.25-10 mg/5 mL syrup Commonly known as:  PHENERGAN with CODEINE Take 5 mL by mouth every six (6) hours as needed for Cough. Max Daily Amount: 20 mL. TRUE METRIX GLUCOSE TEST STRIP strip Generic drug:  glucose blood VI test strips TEST BLOOD SUGAR TWICE DAILY  AND AS NEEDED  
  
 VITAMIN D3 5,000 unit Tab tablet Generic drug:  cholecalciferol (VITAMIN D3) Take 1 Cap by mouth daily. Prescriptions Printed Refills  
 promethazine-codeine (PHENERGAN WITH CODEINE) 6.25-10 mg/5 mL syrup 0 Sig: Take 5 mL by mouth every six (6) hours as needed for Cough. Max Daily Amount: 20 mL. Class: Print Route: Oral  
  
Prescriptions Sent to Pharmacy Refills  
 azithromycin (ZITHROMAX) 250 mg tablet 0 Sig: Take 2 tablets today, then take 1 tablet daily Class: Normal  
 Pharmacy: [facility] [address] #: [phone] Follow-up Instructions Return if symptoms worsen or fail to improve. Introducing [facility]! Jarred Farr introduces [facility] patient portal. Now you can access parts of your medical record, email your doctor's office, and request medication refills online. 1. In your internet browser, go to [email or web address] 2. Click on the First Time User? Click Here link in the Sign In box. You will see the New Member Sign Up page. 3. Enter your [facility] Access Code exactly as it appears below. You will not need to use this code after youve completed the sign-up process. If you do not sign up before the expiration date, you must request a new code. · [facility] Access Code: [email or web address] Expires: 7/30/2017 11:54 AM 
 
4.  Enter the last four digits of your Social Security Number (xxxx) and Date of Birth (mm/dd/yyyy) as indicated and click Submit. You will be taken to the next sign-up page. 5. Create a [employer] ID. This will be your [employer] login ID and cannot be changed, so think of one that is secure and easy to remember. 6. Create a [employer] password. You can change your password at any time. 7. Enter your Password Reset Question and Answer. This can be used at a later time if you forget your password. 8. Enter your e-mail address. You will receive e-mail notification when new information is available in [address] 9. Click Sign Up. You can now view and download portions of your medical record. 10. Click the Download Summary menu link to download a portable copy of your medical information. If you have questions, please visit the Frequently Asked Questions section of the [employer] website. Remember, [employer] is NOT to be used for urgent needs. For medical emergencies, dial 911. Now available from your iPhone and Android! Please provide this summary of care documentation to your next provider. Your primary care clinician is listed as Lianet Waterman. If you have any questions after today's visit, please call [phone].

## 2017-06-27 ENCOUNTER — OFFICE VISIT (OUTPATIENT)
Dept: FAMILY MEDICINE CLINIC | Age: 72
End: 2017-06-27

## 2017-06-27 VITALS
RESPIRATION RATE: 16 BRPM | WEIGHT: 181 LBS | SYSTOLIC BLOOD PRESSURE: 148 MMHG | HEIGHT: 63 IN | HEART RATE: 69 BPM | TEMPERATURE: 97.3 F | BODY MASS INDEX: 32.07 KG/M2 | DIASTOLIC BLOOD PRESSURE: 71 MMHG | OXYGEN SATURATION: 100 %

## 2017-06-27 DIAGNOSIS — Z91.09 ENVIRONMENTAL ALLERGIES: ICD-10-CM

## 2017-06-27 DIAGNOSIS — Z51.81 ENCOUNTER FOR MEDICATION MONITORING: ICD-10-CM

## 2017-06-27 DIAGNOSIS — I10 ESSENTIAL HYPERTENSION: Primary | ICD-10-CM

## 2017-06-27 RX ORDER — FLUCONAZOLE 100 MG/1
TABLET ORAL
Refills: 1 | COMMUNITY
Start: 2017-03-22 | End: 2017-06-27 | Stop reason: ALTCHOICE

## 2017-06-27 RX ORDER — FLUTICASONE PROPIONATE 50 MCG
SPRAY, SUSPENSION (ML) NASAL
Qty: 48 G | Refills: 3 | Status: SHIPPED | OUTPATIENT
Start: 2017-06-27 | End: 2018-03-01 | Stop reason: SDUPTHER

## 2017-06-27 RX ORDER — FLUTICASONE PROPIONATE 50 MCG
2 SPRAY, SUSPENSION (ML) NASAL
Qty: 1 BOTTLE | Refills: 11 | Status: SHIPPED | OUTPATIENT
Start: 2017-06-27 | End: 2017-06-27 | Stop reason: SDUPTHER

## 2017-06-27 NOTE — PROGRESS NOTES
Chief Complaint   Patient presents with    Hypertension    Diabetes       CMP 5/1/2017    Lipid 5/1/2017    A1C 5/1/2017    Microalbumin 1/24/2017    Mammogram 8/31/2016    Colonoscopy 3/4/2016 by Dr. Wade Self- sania in 10 years. Pt states she has an eye exam 2/1/2017 with Dr. Melina Parkinson.

## 2017-06-27 NOTE — PROGRESS NOTES
HISTORY OF PRESENT ILLNESS  Chino Rios is a 71 y.o. female. HPI   Follow up on BP which was elevated during her visit in May. However she has not taken her BP medications this morning. BP was improved at her visit earlier this morning. HTN follow up:  Compliant w/ meds, low salt diet, and exercise. No home bp monitoring. No swelling, headache or dizziness. No chest pain, SOB, palpitations. Otherwise feeling well since the last visit. Patient Active Problem List   Diagnosis Code    Medial meniscus tear S83.249A    Right knee DJD M17.11    Arthritis M19.90    [facility] ([facility] C80.1    Chronic pain G89.29    Hyperlipidemia E78.5    Hypovitaminosis D E55.9    Screen for colon cancer Z12.11    Type 2 diabetes mellitus without complication ([facility] L20.8    Essential hypertension I10    Encounter for medication monitoring Z51.81       Current Outpatient Prescriptions   Medication Sig Dispense Refill    fluticasone (FLONASE) 50 mcg/actuation nasal spray 2 Sprays by Both Nostrils route daily as needed for Rhinitis. 1 Bottle 11    promethazine-codeine (PHENERGAN WITH CODEINE) 6.25-10 mg/5 mL syrup Take 5 mL by mouth every six (6) hours as needed for Cough. Max Daily Amount: 20 mL. 180 mL 0    aspirin delayed-release 81 mg tablet Take 1 Tab by mouth daily. 30 Tab 6    amLODIPine (NORVASC) 10 mg tablet Take 10 mg by mouth daily.  TRUE METRIX GLUCOSE TEST STRIP strip TEST BLOOD SUGAR TWICE DAILY  AND AS NEEDED 250 Strip 3    losartan-hydroCHLOROthiazide (HYZAAR) 100-12.5 mg per tablet TAKE 1 TABLET EVERY DAY 90 Tab 3    metFORMIN (GLUCOPHAGE) 500 mg tablet Take 2 tabs every morning and take 2 tabs with dinner 360 Tab 3    albuterol (PROVENTIL HFA, VENTOLIN HFA, PROAIR HFA) 90 mcg/actuation inhaler Take 2 Puffs by inhalation every four (4) hours as needed for Wheezing. 1 Inhaler 0    meloxicam (MOBIC) 15 mg tablet Take 15 mg by mouth as needed for Pain.       loratadine (CLARITIN) 10 mg tablet Take 1 tablet by mouth daily as needed for Allergies. 30 tablet 11    Lancets (PRODIGY TWIST TOP LANCET) misc Use to check blood glucose level twice a day and PRN. 200 Each 3    alcohol swabs (BD SINGLE USE SWABS REGULAR) padm Use to cleanse finger prior to checking glucose level. 200 Each 3    Cholecalciferol, Vitamin D3, (VITAMIN D3) 5,000 unit Tab Take 1 Cap by mouth daily.  Omega-3-DHA-EPA-Fish Oil 1,000 (120-180) mg Cap Take 2 Caps by mouth every seven (7) days.          No Known Allergies    Past Medical History:   Diagnosis Date    Arthritis     knee    Cancer ([facility] 2003    melanoma -right lower extremity    Chronic pain     left knee    Diabetes ([facility]     type 2    Hypertension     Nausea & vomiting     Screen for colon cancer 10/2/2014       Past Surgical History:   Procedure Laterality Date    HX BREAST BIOPSY Right     yrs ago    HX CATARACT REMOVAL  2010    bilateral eyes    HX CHOLECYSTECTOMY      HX HYSTERECTOMY  1990    fibroid    HX KNEE ARTHROSCOPY  1999    left knee replacement    HX ORTHOPAEDIC      age 40- right foot    HX ORTHOPAEDIC  9/11/2015    left wrist surgery    HX OTHER SURGICAL  2005    permanent teeth replacements    HX OTHER SURGICAL Right 2003    melonoma removed rt leg    HX WISDOM TEETH EXTRACTION      [place] COLONOSCOPY FLX DX W/COLLJ SPEC WHEN PFRMD  2007    date and md unknown       Family History   Problem Relation Age of Onset    No Known Problems Mother     No Known Problems Father     Thyroid Disease Sister        Social History   Substance Use Topics    Smoking status: Never Smoker    Smokeless tobacco: Never Used    Alcohol use No        Lab Results  Component Value Date/Time   WBC 3.6 05/01/2017 12:07 PM   HGB 14.0 05/01/2017 12:07 PM   Hemoglobin (POC) 15.3 12/27/2009 07:16 AM   HCT 40.6 05/01/2017 12:07 PM   Hematocrit (POC) 45 12/27/2009 07:16 AM   PLATELET 441 52/68/6090 12:07 PM   MCV 90 05/01/2017 12:07 PM   Lab Results  Component Value Date/Time   Cholesterol, total 209 05/01/2017 12:07 PM   HDL Cholesterol 71 05/01/2017 12:07 PM   LDL, calculated 119 05/01/2017 12:07 PM   Triglyceride 97 05/01/2017 12:07 PM   CHOL/HDL Ratio 4.3 12/27/2009 07:40 AM   Lab Results  Component Value Date/Time   TSH 1.920 03/20/2017 10:33 AM      Lab Results   Component Value Date/Time    Sodium 142 05/01/2017 12:07 PM    Potassium 3.5 05/01/2017 12:07 PM    Chloride 98 05/01/2017 12:07 PM    CO2 24 05/01/2017 12:07 PM    Anion gap 9 10/22/2016 12:46 PM    Glucose 178 05/01/2017 12:07 PM    BUN 12 05/01/2017 12:07 PM    Creatinine 0.46 05/01/2017 12:07 PM    BUN/Creatinine ratio 26 05/01/2017 12:07 PM    GFR est  05/01/2017 12:07 PM    GFR est non- 05/01/2017 12:07 PM    Calcium 9.9 05/01/2017 12:07 PM    Bilirubin, total 0.3 05/01/2017 12:07 PM    ALT (SGPT) 10 05/01/2017 12:07 PM    AST (SGOT) 12 05/01/2017 12:07 PM    Alk. phosphatase 68 05/01/2017 12:07 PM    Protein, total 7.3 05/01/2017 12:07 PM    Albumin 4.7 05/01/2017 12:07 PM    Globulin 4.2 10/22/2016 12:46 PM    A-G Ratio 1.8 05/01/2017 12:07 PM      Lab Results   Component Value Date/Time    Hemoglobin A1c 6.8 02/24/2014 09:00 AM    Hemoglobin A1c (POC) 7.1 05/01/2017 12:07 PM                       Review of Systems   Constitutional: Negative for malaise/fatigue. HENT: Negative for congestion. Eyes: Negative for blurred vision. Respiratory: Negative for cough and shortness of breath. Cardiovascular: Negative for chest pain, palpitations and leg swelling. Gastrointestinal: Negative for abdominal pain, constipation and heartburn. Genitourinary: Negative for dysuria, frequency and urgency. Musculoskeletal: Negative for back pain and joint pain. Neurological: Negative for dizziness, tingling and headaches. Endo/Heme/Allergies: Positive for environmental allergies. Psychiatric/Behavioral: Negative for depression. The patient does not have insomnia.         Physical Exam Constitutional: She appears well-developed and well-nourished. /71  Pulse 69  Temp 97.3 °F (36.3 °C) (Oral)   Resp 16  Ht 5' 2.5\" (1.588 m)  Wt 181 lb (82.1 kg)  LMP 05/10/1990  SpO2 100%  BMI 32.58 kg/m2     HENT:   Right Ear: Tympanic membrane and ear canal normal.   Left Ear: Tympanic membrane and ear canal normal.   Nose: No mucosal edema or rhinorrhea. Mouth/Throat: Oropharynx is clear and moist and mucous membranes are normal.   Neck: Normal range of motion. Neck supple. No thyromegaly present. Cardiovascular: Normal rate and regular rhythm. No murmur heard. Pulmonary/Chest: Effort normal and breath sounds normal.   Abdominal: Soft. Bowel sounds are normal. There is no tenderness. Musculoskeletal: Normal range of motion. She exhibits no edema. Lymphadenopathy:     She has no cervical adenopathy. Skin: Skin is warm and dry. Psychiatric: She has a normal mood and affect. Nursing note and vitals reviewed. ASSESSMENT and PLAN  Jerald Vang was seen today for hypertension and diabetes. Diagnoses and all orders for this visit:    Essential hypertension  Pt will take her medications prior to her next visit  Discussed sodium restriction, high k rich diet, maintaining ideal body weight and regular exercise program such as daily walking 30 min perday 4-5 times per week, as physiologic means to achieve blood pressure control.  Medication compliance advised. Encounter for medication monitoring    Environmental allergies  -    Refill  fluticasone (FLONASE) 50 mcg/actuation nasal spray; 2 Sprays by Both Nostrils route daily as needed for Rhinitis. Follow-up Disposition:  Return in about 7 weeks (around 8/16/2017). reviewed diet, exercise and weight control  cardiovascular risk and specific lipid/LDL goals reviewed  reviewed medications and side effects in detail    I have discussed diagnosis listed in this note with pt and/or family.  I have discussed treatment plans and options and the risk/benefit analysis of those options, including safe use of medications and possible medication side effects. Through the use of shared decision making we have agreed to the above plan. The patient has received an after-visit summary and questions were answered concerning future plans and follow up. Advise pt of any urgent changes then to proceed to the ER.

## 2017-06-27 NOTE — MR AVS SNAPSHOT
Visit Information Date & Time Provider Department Dept. Phone Encounter #  
 6/27/2017  7:30 AM John EngelAlley [phone] [id number] Follow-up Instructions Return in about 7 weeks (around 8/16/2017). Your Appointments 9/5/2017  8:45 AM  
ROUTINE CARE with John Engel MD  
[facility]) Appt Note: 4m f/u  
 [address] [facility] [id number] [phone] [address] [place] [address] [address] Upcoming Health Maintenance Date Due  
 EYE EXAM RETINAL OR DILATED Q1 7/27/2017* INFLUENZA AGE 9 TO ADULT 8/1/2017 HEMOGLOBIN A1C Q6M 11/1/2017 MICROALBUMIN Q1 1/24/2018 FOOT EXAM Q1 5/1/2018 LIPID PANEL Q1 5/1/2018 MEDICARE YEARLY EXAM 5/2/2018 BREAST CANCER SCRN MAMMOGRAM 8/31/2018 GLAUCOMA SCREENING Q2Y 2/1/2019 DTaP/Tdap/Td series (2 - Td) 2/10/2022 COLONOSCOPY 3/4/2026 *Topic was postponed. The date shown is not the original due date. Allergies as of 6/27/2017  Review Complete On: 6/27/2017 By: John Engel MD  
 No Known Allergies Current Immunizations  Reviewed on 6/27/2017 Name Date Influenza High Dose Vaccine PF 1/24/2017, 12/22/2015 Influenza Vaccine 10/15/2014, 10/21/2013 Influenza Vaccine Split 12/17/2012 Pneumococcal Conjugate (PCV-13) 7/14/2015 Pneumococcal Vaccine (Unspecified Type) 2/10/2012 TDAP Vaccine 2/10/2012 Reviewed by John Engel MD on 6/27/2017 at  7:35 AM  
 Reviewed by John Engel MD on 6/27/2017 at  7:55 AM  
 Reviewed by John Engel MD on 6/27/2017 at  8:00 AM  
You Were Diagnosed With   
  
 Codes Comments Environmental allergies     ICD-10-CM: Z91.09 
ICD-9-CM: V15.09 Vitals BP Pulse Temp Resp Height(growth percentile) Weight(growth percentile) 148/71 69 97.3 °F (36.3 °C) (Oral) 16 5' 2.5\" (1.588 m) 181 lb (82.1 kg) LMP SpO2 BMI OB Status Smoking Status 05/10/1990 100% 32.58 kg/m2 Hysterectomy Never Smoker Vitals History BMI and BSA Data Body Mass Index Body Surface Area 32.58 kg/m 2 1.9 m 2 Preferred Pharmacy Pharmacy Name Phone [facility]  [place] [phone] Your Updated Medication List  
  
   
This list is accurate as of: 6/27/17  8:03 AM.  Always use your most recent med list.  
  
  
  
  
 albuterol 90 mcg/actuation inhaler Commonly known as:  PROVENTIL HFA, VENTOLIN HFA, PROAIR HFA Take 2 Puffs by inhalation every four (4) hours as needed for Wheezing. alcohol swabs Padm Commonly known as:  BD Single Use Swabs Regular Use to cleanse finger prior to checking glucose level. aspirin delayed-release 81 mg tablet Take 1 Tab by mouth daily. CLARITIN 10 mg tablet Generic drug:  loratadine Take 1 tablet by mouth daily as needed for Allergies. fluticasone 50 mcg/actuation nasal spray Commonly known as:  [place] 2 Sprays by Both Nostrils route daily as needed for Rhinitis. Lancets Misc Commonly known as:  PRODIGY TWIST TOP LANCET Use to check blood glucose level twice a day and PRN. losartan-hydroCHLOROthiazide 100-12.5 mg per tablet Commonly known as:  HYZAAR  
TAKE 1 TABLET EVERY DAY  
  
 meloxicam 15 mg tablet Commonly known as:  MOBIC Take 15 mg by mouth as needed for Pain.  
  
 metFORMIN 500 mg tablet Commonly known as:  GLUCOPHAGE Take 2 tabs every morning and take 2 tabs with dinner NORVASC 10 mg tablet Generic drug:  amLODIPine Take 10 mg by mouth daily. Omega-3-DHA-EPA-Fish Oil 1,000 mg (120 mg-180 mg) Cap Take 2 Caps by mouth every seven (7) days. promethazine-codeine 6.25-10 mg/5 mL syrup Commonly known as:  PHENERGAN with CODEINE Take 5 mL by mouth every six (6) hours as needed for Cough. Max Daily Amount: 20 mL. TRUE METRIX GLUCOSE TEST STRIP strip Generic drug:  glucose blood VI test strips TEST BLOOD SUGAR TWICE DAILY  AND AS NEEDED  
  
 VITAMIN D3 5,000 unit Tab tablet Generic drug:  cholecalciferol (VITAMIN D3) Take 1 Cap by mouth daily. Prescriptions Sent to Pharmacy Refills  
 fluticasone (FLONASE) 50 mcg/actuation nasal spray 11 Si Sprays by Both Nostrils route daily as needed for Rhinitis. Class: Normal  
 Pharmacy: [facility] [address] #: [phone] Route: Both Nostrils Follow-up Instructions Return in about 7 weeks (around 2017). Introducing [facility]! Pina Renteria introduces [email or web address] patient portal. Now you can access parts of your medical record, email your doctor's office, and request medication refills online. 1. In your internet browser, go to [email or web address] 2. Click on the First Time User? Click Here link in the Sign In box. You will see the New Member Sign Up page. 3. Enter your [email or web address] Access Code exactly as it appears below. You will not need to use this code after youve completed the sign-up process. If you do not sign up before the expiration date, you must request a new code. · [email or web address] Access Code: [email or web address] Expires: 2017 11:54 AM 
 
4. Enter the last four digits of your Social Security Number (xxxx) and Date of Birth (mm/dd/yyyy) as indicated and click Submit. You will be taken to the next sign-up page. 5. Create a [employer] ID. This will be your [email or web address] login ID and cannot be changed, so think of one that is secure and easy to remember. 6. Create a [email or web address] password. You can change your password at any time. 7. Enter your Password Reset Question and Answer. This can be used at a later time if you forget your password. 8. Enter your e-mail address. You will receive e-mail notification when new information is available in [address] 9. Click Sign Up. You can now view and download portions of your medical record. 10. Click the Download Summary menu link to download a portable copy of your medical information. If you have questions, please visit the Frequently Asked Questions section of the [employer] website. Remember, [employer] is NOT to be used for urgent needs. For medical emergencies, dial 911. Now available from your iPhone and Android! Please provide this summary of care documentation to your next provider. Your primary care clinician is listed as Renetta Meneses. If you have any questions after today's visit, please call [phone].

## 2017-09-05 ENCOUNTER — OFFICE VISIT (OUTPATIENT)
Dept: FAMILY MEDICINE CLINIC | Age: 72
End: 2017-09-05

## 2017-09-05 VITALS
HEIGHT: 63 IN | RESPIRATION RATE: 16 BRPM | WEIGHT: 179.2 LBS | HEART RATE: 72 BPM | BODY MASS INDEX: 31.75 KG/M2 | TEMPERATURE: 97.7 F | SYSTOLIC BLOOD PRESSURE: 138 MMHG | DIASTOLIC BLOOD PRESSURE: 80 MMHG | OXYGEN SATURATION: 99 %

## 2017-09-05 DIAGNOSIS — I10 ESSENTIAL HYPERTENSION: Primary | ICD-10-CM

## 2017-09-05 DIAGNOSIS — R61 NIGHT SWEATS: ICD-10-CM

## 2017-09-05 DIAGNOSIS — M19.90 ARTHRITIS: ICD-10-CM

## 2017-09-05 DIAGNOSIS — Z23 ENCOUNTER FOR IMMUNIZATION: ICD-10-CM

## 2017-09-05 DIAGNOSIS — E11.9 TYPE 2 DIABETES MELLITUS WITHOUT COMPLICATION, WITHOUT LONG-TERM CURRENT USE OF INSULIN (HCC): ICD-10-CM

## 2017-09-05 DIAGNOSIS — I10 ESSENTIAL HYPERTENSION: ICD-10-CM

## 2017-09-05 DIAGNOSIS — E78.2 MIXED HYPERLIPIDEMIA: ICD-10-CM

## 2017-09-05 DIAGNOSIS — Z51.81 ENCOUNTER FOR MEDICATION MONITORING: ICD-10-CM

## 2017-09-05 LAB
BILIRUB UR QL STRIP: NEGATIVE
GLUCOSE POC: 143 MG/DL
GLUCOSE UR-MCNC: NEGATIVE MG/DL
HBA1C MFR BLD HPLC: 6.5 %
KETONES P FAST UR STRIP-MCNC: NEGATIVE MG/DL
PH UR STRIP: 5.5 [PH] (ref 4.6–8)
PROT UR QL STRIP: NEGATIVE MG/DL
SP GR UR STRIP: 1.02 (ref 1–1.03)
UA UROBILINOGEN AMB POC: NORMAL (ref 0.2–1)
URINALYSIS CLARITY POC: CLEAR
URINALYSIS COLOR POC: YELLOW
URINE BLOOD POC: NEGATIVE
URINE LEUKOCYTES POC: NEGATIVE
URINE NITRITES POC: NEGATIVE

## 2017-09-05 RX ORDER — LOSARTAN POTASSIUM AND HYDROCHLOROTHIAZIDE 12.5; 1 MG/1; MG/1
TABLET ORAL
Qty: 90 TAB | Refills: 3 | Status: SHIPPED | OUTPATIENT
Start: 2017-09-05 | End: 2018-10-21 | Stop reason: SDUPTHER

## 2017-09-05 RX ORDER — LOSARTAN POTASSIUM AND HYDROCHLOROTHIAZIDE 12.5; 1 MG/1; MG/1
TABLET ORAL
Qty: 90 TAB | Refills: 3 | Status: SHIPPED | OUTPATIENT
Start: 2017-09-05 | End: 2017-09-05 | Stop reason: SDUPTHER

## 2017-09-05 NOTE — PROGRESS NOTES
HISTORY OF PRESENT ILLNESS  Kaylah Cline is a 71 y.o. female. HPI   Follow up on BP, cholesterol and BS. Overall feeling well. C/o increase night sweats over the past 1 month. Does not occur every night but happens about 3 to 4 night in a weeks. No sx during the day. No sx of infection with congestion or cough. No dysuria. HTN follow up:  Compliant w/ meds, low salt diet, and exercise. No home bp monitoring. No swelling, headache or dizziness. No chest pain, SOB, palpitations. Otherwise feeling well since the last visit. DM type II follow up:  Compliant w/ meds, diabetic diet, and exercise. Last a1c was 7.1% in 05/17. Obtains home glucose monitoring averaging 100-140. Checks BS BID on most days and prn. Pt does not have BS log at visit today. No Rf needed for today. Denies any tingling sensation, polyuria and polydipsia. No blurred vision. No significant weight changes. Feeling well since last OV. Hypercholesterolemia follow up:  Compliant w/ low fat, low cholesterol diet. Exercising some. Currently not on medication. Patient Active Problem List   Diagnosis Code    Right knee DJD M17.11    Arthritis M19.90    Cancer ([facility] C80.1    Chronic pain G89.29    Hyperlipidemia E78.5    Hypovitaminosis D E55.9    Screen for colon cancer Z12.11    Type 2 diabetes mellitus without complication [facility] T17.8    Essential hypertension I10    Encounter for medication monitoring Z51.81       Current Outpatient Prescriptions   Medication Sig Dispense Refill    fluticasone (FLONASE) 50 mcg/actuation nasal spray SHAKE LIQUID AND USE 2 SPRAYS IN EACH NOSTRIL DAILY AS NEEDED FOR RHINITIS 48 g 3    promethazine-codeine (PHENERGAN WITH CODEINE) 6.25-10 mg/5 mL syrup Take 5 mL by mouth every six (6) hours as needed for Cough. Max Daily Amount: 20 mL. 180 mL 0    aspirin delayed-release 81 mg tablet Take 1 Tab by mouth daily.  30 Tab 6    amLODIPine (NORVASC) 10 mg tablet Take 10 mg by mouth daily.      TRUE METRIX GLUCOSE TEST STRIP strip TEST BLOOD SUGAR TWICE DAILY  AND AS NEEDED 250 Strip 3    losartan-hydroCHLOROthiazide (HYZAAR) 100-12.5 mg per tablet TAKE 1 TABLET EVERY DAY 90 Tab 3    metFORMIN (GLUCOPHAGE) 500 mg tablet Take 2 tabs every morning and take 2 tabs with dinner 360 Tab 3    albuterol (PROVENTIL HFA, VENTOLIN HFA, PROAIR HFA) 90 mcg/actuation inhaler Take 2 Puffs by inhalation every four (4) hours as needed for Wheezing. 1 Inhaler 0    meloxicam (MOBIC) 15 mg tablet Take 15 mg by mouth as needed for Pain.  loratadine (CLARITIN) 10 mg tablet Take 1 tablet by mouth daily as needed for Allergies. 30 tablet 11    Lancets (PRODIGY TWIST TOP LANCET) misc Use to check blood glucose level twice a day and PRN. 200 Each 3    alcohol swabs (BD SINGLE USE SWABS REGULAR) padm Use to cleanse finger prior to checking glucose level. 200 Each 3    Cholecalciferol, Vitamin D3, (VITAMIN D3) 5,000 unit Tab Take 1 Cap by mouth daily.  Omega-3-DHA-EPA-Fish Oil 1,000 (120-180) mg Cap Take 2 Caps by mouth every seven (7) days.          No Known Allergies      Past Medical History:   Diagnosis Date    Arthritis     knee    Cancer ([facility] 2003    melanoma -right lower extremity    Chronic pain     left knee    Diabetes ([facility]     type 2    Hypertension     Nausea & vomiting     Screen for colon cancer 10/2/2014         Past Surgical History:   Procedure Laterality Date    HX BREAST BIOPSY Right     yrs ago    HX CATARACT REMOVAL  2010    bilateral eyes    HX CHOLECYSTECTOMY      HX HYSTERECTOMY  1990    fibroid    HX KNEE ARTHROSCOPY  1999    left knee replacement    HX ORTHOPAEDIC      age 40- right foot    HX ORTHOPAEDIC  9/11/2015    left wrist surgery    HX OTHER SURGICAL  2005    permanent teeth replacements    HX OTHER SURGICAL Right 2003    melonoma removed rt leg    HX WISDOM TEETH EXTRACTION      [facility] COLONOSCOPY FLX DX W/COLLJ SPEC WHEN PFRMD  2007    date and md unknown         Family History   Problem Relation Age of Onset    No Known Problems Mother     No Known Problems Father     Thyroid Disease Sister        Social History   Substance Use Topics    Smoking status: Never Smoker    Smokeless tobacco: Never Used    Alcohol use No          Lab Results   Component Value Date/Time    WBC 3.6 05/01/2017 12:07 PM    Hemoglobin (POC) 15.3 12/27/2009 07:16 AM    HGB 14.0 05/01/2017 12:07 PM    Hematocrit (POC) 45 12/27/2009 07:16 AM    HCT 40.6 05/01/2017 12:07 PM    PLATELET 359 77/10/9520 12:07 PM    MCV 90 05/01/2017 12:07 PM       Lab Results   Component Value Date/Time    Cholesterol, total 209 05/01/2017 12:07 PM    HDL Cholesterol 71 05/01/2017 12:07 PM    LDL, calculated 119 05/01/2017 12:07 PM    Triglyceride 97 05/01/2017 12:07 PM    CHOL/HDL Ratio 4.3 12/27/2009 07:40 AM       Lab Results   Component Value Date/Time    Sodium 142 05/01/2017 12:07 PM    Potassium 3.5 05/01/2017 12:07 PM    Chloride 98 05/01/2017 12:07 PM    CO2 24 05/01/2017 12:07 PM    Anion gap 9 10/22/2016 12:46 PM    Glucose 178 05/01/2017 12:07 PM    BUN 12 05/01/2017 12:07 PM    Creatinine 0.46 05/01/2017 12:07 PM    BUN/Creatinine ratio 26 05/01/2017 12:07 PM    GFR est  05/01/2017 12:07 PM    GFR est non- 05/01/2017 12:07 PM    Calcium 9.9 05/01/2017 12:07 PM    Bilirubin, total 0.3 05/01/2017 12:07 PM    ALT (SGPT) 10 05/01/2017 12:07 PM    AST (SGOT) 12 05/01/2017 12:07 PM    Alk. phosphatase 68 05/01/2017 12:07 PM    Protein, total 7.3 05/01/2017 12:07 PM    Albumin 4.7 05/01/2017 12:07 PM    Globulin 4.2 10/22/2016 12:46 PM    A-G Ratio 1.8 05/01/2017 12:07 PM      Lab Results   Component Value Date/Time    Hemoglobin A1c 6.8 02/24/2014 09:00 AM    Hemoglobin A1c (POC) 7.1 05/01/2017 12:07 PM         Review of Systems   Constitutional: Negative for malaise/fatigue. HENT: Negative for congestion. Eyes: Negative for blurred vision.    Respiratory: Negative for cough and shortness of breath. Cardiovascular: Negative for chest pain, palpitations and leg swelling. Gastrointestinal: Negative for abdominal pain, constipation and heartburn. Genitourinary: Negative for dysuria, frequency and urgency. Musculoskeletal: Negative for back pain and joint pain. Neurological: Negative for dizziness, tingling and headaches. Endo/Heme/Allergies: Positive for environmental allergies. Psychiatric/Behavioral: Negative for depression. The patient does not have insomnia. Physical Exam   Constitutional: She appears well-developed and well-nourished. /80  Pulse 72  Temp 97.7 °F (36.5 °C) (Oral)   Resp 16  Ht 5' 2.5\" (1.588 m)  Wt 179 lb 3.2 oz (81.3 kg)  LMP 05/10/1990  SpO2 99%  BMI 32.25 kg/m2     HENT:   Right Ear: Tympanic membrane and ear canal normal.   Left Ear: Tympanic membrane and ear canal normal.   Nose: No mucosal edema or rhinorrhea. Mouth/Throat: Oropharynx is clear and moist and mucous membranes are normal.   Neck: Normal range of motion. Neck supple. No thyromegaly present. Cardiovascular: Normal rate and regular rhythm. No murmur heard. Pulmonary/Chest: Effort normal and breath sounds normal.   Abdominal: Soft. Bowel sounds are normal. There is no tenderness. Musculoskeletal: Normal range of motion. She exhibits no edema. Lymphadenopathy:     She has no cervical adenopathy. Skin: Skin is warm and dry. Psychiatric: She has a normal mood and affect. Nursing note and vitals reviewed. ASSESSMENT and PLAN  Diagnoses and all orders for this visit:    1. Essential hypertension  -     Refill losartan-hydroCHLOROthiazide (HYZAAR) 100-12.5 mg per tablet; TAKE 1 TABLET EVERY DAY  Discussed sodium restriction, high k rich diet, maintaining ideal body weight and regular exercise program such as daily walking 30 min perday 4-5 times per week, as physiologic means to achieve blood pressure control.  Medication compliance advised.      2. Type 2 diabetes mellitus without complication, without long-term current use of insulin (HCC)  -     AMB POC HEMOGLOBIN A1C  -     AMB POC GLUCOSE, QUANTITATIVE, BLOOD    3. Mixed hyperlipidemia  Continue to monitor. Work on diet and exercise. 4. Arthritis  Stable. 5. Night sweats  -     AMB POC URINALYSIS DIP STICK AUTO W/ MICRO  -     TSH 3RD GENERATION  Discussed sleep hygiene with sleeping with cooler room. Light clothing and cover. 6. Encounter for medication monitoring  -     METABOLIC PANEL, BASIC      Follow-up Disposition: 4 months  reviewed diet, exercise and weight control  cardiovascular risk and specific lipid/LDL goals reviewed  reviewed medications and side effects in detail  specific diabetic recommendations: low cholesterol diet, weight control and daily exercise discussed, home glucose monitoring emphasized, all medications, side effects and compliance discussed carefully, foot care discussed and Podiatry visits discussed, annual eye examinations at Ophthalmology discussed and glycohemoglobin and other lab monitoring discussed    I have discussed diagnosis listed in this note with pt and/or family. I have discussed treatment plans and options and the risk/benefit analysis of those options, including safe use of medications and possible medication side effects. Through the use of shared decision making we have agreed to the above plan. The patient has received an after-visit summary and questions were answered concerning future plans and follow up. Advise pt of any urgent changes then to proceed to the ER.

## 2017-09-05 NOTE — PROGRESS NOTES
Chief Complaint   Patient presents with    Hypertension     4 month follow up    Diabetes     4 month follow up     1. Have you been to the ER, urgent care clinic since your last visit? Hospitalized since your last visit? NO    2. Have you seen or consulted any other health care providers outside of the [facility] since your last visit? Include any pap smears or colon screening.    NO

## 2017-09-05 NOTE — MR AVS SNAPSHOT
Visit Information Date & Time Provider Department Dept. Phone Encounter #  
 9/5/2017  8:45 AM Korina Dykes MD [facility] [phone] [id number] Follow-up Instructions Return in about 4 months (around 1/5/2018). Upcoming Health Maintenance Date Due  
 EYE EXAM RETINAL OR DILATED Q1 10/9/2015 INFLUENZA AGE 9 TO ADULT 8/1/2017 HEMOGLOBIN A1C Q6M 11/1/2017 MICROALBUMIN Q1 1/24/2018 FOOT EXAM Q1 5/1/2018 LIPID PANEL Q1 5/1/2018 MEDICARE YEARLY EXAM 5/2/2018 BREAST CANCER SCRN MAMMOGRAM 8/31/2018 GLAUCOMA SCREENING Q2Y 2/1/2019 DTaP/Tdap/Td series (2 - Td) 2/10/2022 COLONOSCOPY 3/4/2026 Allergies as of 9/5/2017  Review Complete On: 9/5/2017 By: Korina Dykes MD  
 No Known Allergies Current Immunizations  Reviewed on 9/5/2017 Name Date Influenza High Dose Vaccine PF 1/24/2017, 12/22/2015 Influenza Vaccine 10/15/2014, 10/21/2013 Influenza Vaccine Split 12/17/2012 Pneumococcal Conjugate (PCV-13) 7/14/2015 TDAP Vaccine 2/10/2012 ZZZ-RETIRED (DO NOT USE) Pneumococcal Vaccine (Unspecified Type) 2/10/2012 Reviewed by Korina Dykes MD on 9/5/2017 at  9:22 AM  
You Were Diagnosed With   
  
 Codes Comments Essential hypertension    -  Primary ICD-10-CM: I10 
ICD-9-CM: 401.9 Type 2 diabetes mellitus without complication, without long-term current use of insulin (HCC)     ICD-10-CM: E11.9 ICD-9-CM: 250.00 Mixed hyperlipidemia     ICD-10-CM: E78.2 ICD-9-CM: 272.2 Arthritis     ICD-10-CM: M19.90 ICD-9-CM: 716.90 Night sweats     ICD-10-CM: R61 
ICD-9-CM: 780.8 Encounter for medication monitoring     ICD-10-CM: Z51.81 
ICD-9-CM: V58.83 Vitals BP Pulse Temp Resp Height(growth percentile) Weight(growth percentile) 138/80 72 97.7 °F (36.5 °C) (Oral) 16 5' 2.5\" (1.588 m) 179 lb 3.2 oz (81.3 kg) LMP SpO2 BMI OB Status Smoking Status 05/10/1990 99% 32.25 kg/m2 Hysterectomy Never Smoker Vitals History BMI and BSA Data Body Mass Index Body Surface Area  
 32.25 kg/m 2 1.89 m 2 Preferred Pharmacy Pharmacy Name Phone [facility] [address] Clar[facility]  [place] [phone] Your Updated Medication List  
  
   
This list is accurate as of: 9/5/17 10:06 AM.  Always use your most recent med list.  
  
  
  
  
 albuterol 90 mcg/actuation inhaler Commonly known as:  PROVENTIL HFA, VENTOLIN HFA, PROAIR HFA Take 2 Puffs by inhalation every four (4) hours as needed for Wheezing. alcohol swabs Padm Commonly known as:  BD Single Use Swabs Regular Use to cleanse finger prior to checking glucose level. aspirin delayed-release 81 mg tablet Take 1 Tab by mouth daily. CLARITIN 10 mg tablet Generic drug:  loratadine Take 1 tablet by mouth daily as needed for Allergies. fluticasone 50 mcg/actuation nasal spray Commonly known as:  Corwin Campos SHAKE LIQUID AND USE 2 SPRAYS IN EACH NOSTRIL DAILY AS NEEDED FOR RHINITIS Lancets Misc Commonly known as:  PRODIGY TWIST TOP LANCET Use to check blood glucose level twice a day and PRN. losartan-hydroCHLOROthiazide 100-12.5 mg per tablet Commonly known as:  HYZAAR  
TAKE 1 TABLET EVERY DAY  
  
 meloxicam 15 mg tablet Commonly known as:  MOBIC Take 15 mg by mouth as needed for Pain.  
  
 metFORMIN 500 mg tablet Commonly known as:  GLUCOPHAGE Take 2 tabs every morning and take 2 tabs with dinner NORVASC 10 mg tablet Generic drug:  amLODIPine Take 10 mg by mouth daily. Omega-3-DHA-EPA-Fish Oil 1,000 mg (120 mg-180 mg) Cap Take 2 Caps by mouth every seven (7) days. promethazine-codeine 6.25-10 mg/5 mL syrup Commonly known as:  PHENERGAN with CODEINE Take 5 mL by mouth every six (6) hours as needed for Cough. Max Daily Amount: 20 mL. TRUE METRIX GLUCOSE TEST STRIP strip Generic drug:  glucose blood VI test strips TEST BLOOD SUGAR TWICE DAILY  AND AS NEEDED  
  
 VITAMIN D3 5,000 unit Tab tablet Generic drug:  cholecalciferol (VITAMIN D3) Take 1 Cap by mouth daily. Prescriptions Sent to Pharmacy Refills  
 losartan-hydroCHLOROthiazide (HYZAAR) 100-12.5 mg per tablet 3 Sig: TAKE 1 TABLET EVERY DAY Class: Normal  
 Pharmacy: [facility] [address] Daly Baez [address] [place] [address] #: [phone] We Performed the Following AMB POC GLUCOSE, QUANTITATIVE, BLOOD [83437 CPT(R)] AMB POC HEMOGLOBIN A1C [81081 CPT(R)] AMB POC URINALYSIS DIP STICK AUTO W/ MICRO [28708 CPT(R)] METABOLIC PANEL, BASIC [54295 CPT(R)] TSH 3RD GENERATION [60615 CPT(R)] Follow-up Instructions Return in about 4 months (around 1/5/2018). To-Do List   
 10/17/2017 9:30 AM  
  Appointment with [facility] 1 at [facility] ([phone]) Shower or bathe using soap and water. Do not use deodorant, powder, perfumes, or lotion the day of your exam.  If your prior mammograms were not performed at [facility] 6 please bring films with you or forward prior images 2 days before your procedure. Check in at registration 15min before your appointment time unless you were instructed to do otherwise. A script is not necessary, but if you have one, please bring it on the day of the mammogram or have it faxed to the department. [facility] [phone] [facility]  [phone] [facility] [address] JO[address]  [phone] Critical access hospital [phone] [address] [place] [phone] Introducing [facility] [place] [facility]! Dalila Claros introduces [email or web address] patient portal. Now you can access parts of your medical record, email your doctor's office, and request medication refills online. 1. In your internet browser, go to [email or web address] 2. Click on the First Time User? Click Here link in the Sign In box. You will see the New Member Sign Up page. 3. Enter your Passman Access Code exactly as it appears below. You will not need to use this code after youve completed the sign-up process. If you do not sign up before the expiration date, you must request a new code. · Passman Access Code: [email or web address] Expires: 12/4/2017 10:06 AM 
 
4. Enter the last four digits of your Social Security Number (xxxx) and Date of Birth (mm/dd/yyyy) as indicated and click Submit. You will be taken to the next sign-up page. 5. Create a Passman ID. This will be your Passman login ID and cannot be changed, so think of one that is secure and easy to remember. 6. Create a Passman password. You can change your password at any time. 7. Enter your Password Reset Question and Answer. This can be used at a later time if you forget your password. 8. Enter your e-mail address. You will receive e-mail notification when new information is available in [address] 9. Click Sign Up. You can now view and download portions of your medical record. 10. Click the Download Summary menu link to download a portable copy of your medical information. If you have questions, please visit the Frequently Asked Questions section of the Passman website. Remember, Passman is NOT to be used for urgent needs. For medical emergencies, dial 911. Now available from your iPhone and Android! Please provide this summary of care documentation to your next provider. Your primary care clinician is listed as Joya Channel. If you have any questions after today's visit, please call [phone].

## 2017-09-06 LAB
BUN SERPL-MCNC: 10 MG/DL (ref 8–27)
BUN/CREAT SERPL: 18 (ref 12–28)
CALCIUM SERPL-MCNC: 9.7 MG/DL (ref 8.7–10.3)
CHLORIDE SERPL-SCNC: 101 MMOL/L (ref 96–106)
CO2 SERPL-SCNC: 25 MMOL/L (ref 18–29)
CREAT SERPL-MCNC: 0.55 MG/DL (ref 0.57–1)
GLUCOSE SERPL-MCNC: 144 MG/DL (ref 65–99)
POTASSIUM SERPL-SCNC: 4.1 MMOL/L (ref 3.5–5.2)
SODIUM SERPL-SCNC: 142 MMOL/L (ref 134–144)
TSH SERPL DL<=0.005 MIU/L-ACNC: 2.22 UIU/ML (ref 0.45–4.5)

## 2017-10-16 ENCOUNTER — TELEPHONE (OUTPATIENT)
Dept: FAMILY MEDICINE CLINIC | Age: 72
End: 2017-10-16

## 2017-10-16 NOTE — TELEPHONE ENCOUNTER
Received call from [facility], states that [facility] send them out to do physical. 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

## 2017-10-17 DIAGNOSIS — M85.80 OSTEOPENIA, UNSPECIFIED LOCATION: Primary | ICD-10-CM

## 2017-10-18 DIAGNOSIS — E11.9 TYPE 2 DIABETES MELLITUS WITHOUT COMPLICATION, WITHOUT LONG-TERM CURRENT USE OF INSULIN (HCC): ICD-10-CM

## 2017-10-18 RX ORDER — METFORMIN HYDROCHLORIDE 500 MG/1
TABLET ORAL
Qty: 270 TAB | Refills: 3 | Status: SHIPPED | OUTPATIENT
Start: 2017-10-18 | End: 2018-08-23 | Stop reason: SDUPTHER

## 2017-12-04 ENCOUNTER — HOSPITAL ENCOUNTER (OUTPATIENT)
Dept: MAMMOGRAPHY | Age: 72
Discharge: HOME | End: 2017-12-04
Attending: FAMILY MEDICINE
Payer: MEDICARE

## 2017-12-04 ENCOUNTER — HOSPITAL ENCOUNTER (OUTPATIENT)
Dept: BONE DENSITY | Age: 72
Discharge: HOME | End: 2017-12-04
Attending: FAMILY MEDICINE
Payer: MEDICARE

## 2017-12-04 DIAGNOSIS — Z12.31 VISIT FOR SCREENING MAMMOGRAM: ICD-10-CM

## 2017-12-04 DIAGNOSIS — M85.80 OSTEOPENIA, UNSPECIFIED LOCATION: ICD-10-CM

## 2017-12-04 PROCEDURE — 77080 DXA BONE DENSITY AXIAL: CPT

## 2017-12-04 PROCEDURE — 77067 SCR MAMMO BI INCL CAD: CPT

## 2018-01-16 ENCOUNTER — OFFICE VISIT (OUTPATIENT)
Dept: FAMILY MEDICINE CLINIC | Age: 73
End: 2018-01-16

## 2018-01-16 VITALS
HEIGHT: 63 IN | WEIGHT: 180 LBS | SYSTOLIC BLOOD PRESSURE: 136 MMHG | OXYGEN SATURATION: 99 % | RESPIRATION RATE: 16 BRPM | HEART RATE: 74 BPM | TEMPERATURE: 97.5 F | DIASTOLIC BLOOD PRESSURE: 72 MMHG | BODY MASS INDEX: 31.89 KG/M2

## 2018-01-16 DIAGNOSIS — E78.2 MIXED HYPERLIPIDEMIA: ICD-10-CM

## 2018-01-16 DIAGNOSIS — I10 ESSENTIAL HYPERTENSION: Primary | ICD-10-CM

## 2018-01-16 DIAGNOSIS — E11.9 TYPE 2 DIABETES MELLITUS WITHOUT COMPLICATION, WITHOUT LONG-TERM CURRENT USE OF INSULIN (HCC): ICD-10-CM

## 2018-01-16 DIAGNOSIS — R82.90 NONSPECIFIC FINDING ON EXAMINATION OF URINE: ICD-10-CM

## 2018-01-16 DIAGNOSIS — Z51.81 ENCOUNTER FOR MEDICATION MONITORING: ICD-10-CM

## 2018-01-16 LAB
BILIRUB UR QL STRIP: NEGATIVE
GLUCOSE POC: 148 MG/DL
GLUCOSE UR-MCNC: NEGATIVE MG/DL
HBA1C MFR BLD HPLC: 6.8 %
KETONES P FAST UR STRIP-MCNC: NEGATIVE MG/DL
PH UR STRIP: 6.5 [PH] (ref 4.6–8)
PROT UR QL STRIP: NEGATIVE
SP GR UR STRIP: 1.01 (ref 1–1.03)
UA UROBILINOGEN AMB POC: NORMAL (ref 0.2–1)
URINALYSIS CLARITY POC: CLEAR
URINALYSIS COLOR POC: YELLOW
URINE BLOOD POC: NORMAL
URINE LEUKOCYTES POC: NORMAL
URINE NITRITES POC: NEGATIVE

## 2018-01-16 NOTE — MR AVS SNAPSHOT
[facility] 
 
 
 [address] [facility] [address] [address] 
[phone] Patient: Ras Joseph MRN: [id number] QNL:[id number] Visit Information Date & Time Provider Department Dept. Phone Encounter #  
 1/16/2018  9:00 AM Brendan Jo Mack Soto [phone] [id number] Follow-up Instructions Return in about 4 months (around 5/16/2018) for physical.  
  
Upcoming Health Maintenance Date Due  
 EYE EXAM RETINAL OR DILATED Q1 10/9/2015 MICROALBUMIN Q1 1/24/2018 HEMOGLOBIN A1C Q6M 3/5/2018 FOOT EXAM Q1 5/1/2018 LIPID PANEL Q1 5/1/2018 MEDICARE YEARLY EXAM 5/2/2018 GLAUCOMA SCREENING Q2Y 2/1/2019 BREAST CANCER SCRN MAMMOGRAM 12/4/2019 DTaP/Tdap/Td series (2 - Td) 2/10/2022 COLONOSCOPY 3/4/2026 Allergies as of 1/16/2018  Review Complete On: 1/16/2018 By: Brendan Jo MD  
 No Known Allergies Current Immunizations  Reviewed on 1/16/2018 Name Date Influenza High Dose Vaccine PF 9/5/2017, 1/24/2017, 12/22/2015 Influenza Vaccine 10/15/2014, 10/21/2013 Influenza Vaccine Split 12/17/2012 Pneumococcal Conjugate (PCV-13) 7/14/2015 TDAP Vaccine 2/10/2012 ZZZ-RETIRED (DO NOT USE) Pneumococcal Vaccine (Unspecified Type) 2/10/2012 Reviewed by Conchis Soto LPN on 8/02/0034 at  9:23 AM  
 Reviewed by Brendan Jo MD on 1/16/2018 at  9:43 AM  
You Were Diagnosed With   
  
 Codes Comments Essential hypertension    -  Primary ICD-10-CM: I10 
ICD-9-CM: 401.9 Type 2 diabetes mellitus without complication, without long-term current use of insulin (HCC)     ICD-10-CM: E11.9 ICD-9-CM: 250.00 Mixed hyperlipidemia     ICD-10-CM: E78.2 ICD-9-CM: 272.2 Encounter for medication monitoring     ICD-10-CM: Z51.81 
ICD-9-CM: V58.83 Vitals BP Pulse Temp Resp Height(growth percentile) Weight(growth percentile) 136/72 74 97.5 °F (36.4 °C) (Oral) 16 5' 2.5\" (1.588 m) 180 lb (81.6 kg) LMP SpO2 BMI OB Status Smoking Status 05/10/1990 99% 32.4 kg/m2 Hysterectomy Never Smoker Vitals History BMI and BSA Data Body Mass Index Body Surface Area  
 32.4 kg/m 2 1.9 m 2 Preferred Pharmacy Pharmacy Name Phone Christo Contreras [address] [place], [place] [address] [address] [phone] Your Updated Medication List  
  
   
This list is accurate as of: 1/16/18 10:15 AM.  Always use your most recent med list.  
  
  
  
  
 albuterol 90 mcg/actuation inhaler Commonly known as:  PROVENTIL HFA, VENTOLIN HFA, PROAIR HFA Take 2 Puffs by inhalation every four (4) hours as needed for Wheezing. alcohol swabs Padm Commonly known as:  BD Single Use Swabs Regular Use to cleanse finger prior to checking glucose level. aspirin delayed-release 81 mg tablet Take 1 Tab by mouth daily. CLARITIN 10 mg tablet Generic drug:  loratadine Take 1 tablet by mouth daily as needed for Allergies. fluticasone 50 mcg/actuation nasal spray Commonly known as:  Delmon Pickup SHAKE LIQUID AND USE 2 SPRAYS IN EACH NOSTRIL DAILY AS NEEDED FOR RHINITIS Lancets Misc Commonly known as:  PRODIGY TWIST TOP LANCET Use to check blood glucose level twice a day and PRN. losartan-hydroCHLOROthiazide 100-12.5 mg per tablet Commonly known as:  HYZAAR  
TAKE 1 TABLET EVERY DAY  
  
 meloxicam 15 mg tablet Commonly known as:  MOBIC Take 15 mg by mouth as needed for Pain.  
  
 metFORMIN 500 mg tablet Commonly known as:  GLUCOPHAGE  
TAKE 2 TABLETS EVERY MORNING AND TAKE 1 TABLET WITH DINNER  
  
 NORVASC 10 mg tablet Generic drug:  amLODIPine Take 10 mg by mouth daily. Omega-3-DHA-EPA-Fish Oil 1,000 mg (120 mg-180 mg) Cap Take 2 Caps by mouth every seven (7) days. TRUE METRIX GLUCOSE TEST STRIP strip Generic drug:  glucose blood VI test strips TEST BLOOD SUGAR TWICE DAILY  AND AS NEEDED  
  
 VITAMIN D3 5,000 unit Tab tablet Generic drug:  cholecalciferol (VITAMIN D3) Take 1 Cap by mouth daily. We Performed the Following AMB POC GLUCOSE, QUANTITATIVE, BLOOD [53699 CPT(R)] AMB POC HEMOGLOBIN A1C [23685 CPT(R)] AMB POC URINALYSIS DIP STICK AUTO W/ MICRO [93913 CPT(R)] LIPID PANEL [56356 CPT(R)] METABOLIC PANEL, COMPREHENSIVE [42617 CPT(R)] MICROALBUMIN, UR, RAND W/ MICROALBUMIN/CREA RATIO [id number] CPT(R)] Follow-up Instructions Return in about 4 months (around 5/16/2018) for physical.  
  
  
Introducing [address]! [facility] introduces Zadby patient portal. Now you can access parts of your medical record, email your doctor's office, and request medication refills online. 1. In your internet browser, go to [email or web address] 2. Click on the First Time User? Click Here link in the Sign In box. You will see the New Member Sign Up page. 3. Enter your Zadby Access Code exactly as it appears below. You will not need to use this code after youve completed the sign-up process. If you do not sign up before the expiration date, you must request a new code. · Zadby Access Code: [email or web address] Expires: 3/18/2018  8:55 AM 
 
4. Enter the last four digits of your Social Security Number (xxxx) and Date of Birth (mm/dd/yyyy) as indicated and click Submit. You will be taken to the next sign-up page. 5. Create a [employer] ID. This will be your Zadby login ID and cannot be changed, so think of one that is secure and easy to remember. 6. Create a Zadby password. You can change your password at any time. 7. Enter your Password Reset Question and Answer. This can be used at a later time if you forget your password. 8. Enter your e-mail address. You will receive e-mail notification when new information is available in [address] 9. Click Sign Up. You can now view and download portions of your medical record. 10. Click the Download Summary menu link to download a portable copy of your medical information. If you have questions, please visit the Frequently Asked Questions section of the [employer] website. Remember, [employer] is NOT to be used for urgent needs. For medical emergencies, dial 911. Now available from your iPhone and Android! Please provide this summary of care documentation to your next provider. Your primary care clinician is listed as Brigido Mejia. If you have any questions after today's visit, please call [phone].

## 2018-01-16 NOTE — PROGRESS NOTES
HISTORY OF PRESENT ILLNESS  Danny Wang is a 71 y.o. female. HPI   Follow up on BP, cholesterol and BS. Overall feeling well. HTN follow up:  Compliant w/ meds, low salt diet, and exercise. No home bp monitoring. No swelling, headache or dizziness. No chest pain, SOB, palpitations. Otherwise feeling well since the last visit. DM type II follow up:  Compliant w/ meds, diabetic diet, and exercise. Last a1c level was 6.5% in 9/2017. Obtains home glucose monitoring averaging 100-140s. BS was a little higher during the holidays. . Checks BS BID on most days and prn. Pt does not have BS log at visit today. No Rf needed for today. Denies any tingling sensation, polyuria and polydipsia. No blurred vision. No significant weight changes. Feeling well since last OV. Hypercholesterolemia follow up:  Compliant w/ low fat, low cholesterol diet. Exercising some. Currently not on medication. Patient Active Problem List   Diagnosis Code    Right knee DJD M17.11    Arthritis M19.90    Cancer ([facility] [address] C80.1    Chronic pain G89.29    Hyperlipidemia E78.5    Hypovitaminosis D E55.9    Screen for colon cancer Z12.11    Type 2 diabetes mellitus without complication [facility] C82.4    Essential hypertension I10    Encounter for medication monitoring Z51.81       Current Outpatient Prescriptions   Medication Sig Dispense Refill    metFORMIN (GLUCOPHAGE) 500 mg tablet TAKE 2 TABLETS EVERY MORNING AND TAKE 1 TABLET WITH DINNER 270 Tab 3    losartan-hydroCHLOROthiazide (HYZAAR) 100-12.5 mg per tablet TAKE 1 TABLET EVERY DAY 90 Tab 3    fluticasone (FLONASE) 50 mcg/actuation nasal spray SHAKE LIQUID AND USE 2 SPRAYS IN EACH NOSTRIL DAILY AS NEEDED FOR RHINITIS 48 g 3    promethazine-codeine (PHENERGAN WITH CODEINE) 6.25-10 mg/5 mL syrup Take 5 mL by mouth every six (6) hours as needed for Cough. Max Daily Amount: 20 mL. 180 mL 0    aspirin delayed-release 81 mg tablet Take 1 Tab by mouth daily.  30 Tab 6    amLODIPine (NORVASC) 10 mg tablet Take 10 mg by mouth daily.  TRUE METRIX GLUCOSE TEST STRIP strip TEST BLOOD SUGAR TWICE DAILY  AND AS NEEDED 250 Strip 3    albuterol (PROVENTIL HFA, VENTOLIN HFA, PROAIR HFA) 90 mcg/actuation inhaler Take 2 Puffs by inhalation every four (4) hours as needed for Wheezing. 1 Inhaler 0    meloxicam (MOBIC) 15 mg tablet Take 15 mg by mouth as needed for Pain.  loratadine (CLARITIN) 10 mg tablet Take 1 tablet by mouth daily as needed for Allergies. 30 tablet 11    Lancets (PRODIGY TWIST TOP LANCET) misc Use to check blood glucose level twice a day and PRN. 200 Each 3    alcohol swabs (BD SINGLE USE SWABS REGULAR) padm Use to cleanse finger prior to checking glucose level. 200 Each 3    Cholecalciferol, Vitamin D3, (VITAMIN D3) 5,000 unit Tab Take 1 Cap by mouth daily.  Omega-3-DHA-EPA-Fish Oil 1,000 (120-180) mg Cap Take 2 Caps by mouth every seven (7) days.          No Known Allergies      Past Medical History:   Diagnosis Date    Arthritis     knee    Cancer ([facility] [address] 2003    melanoma -right lower extremity    Chronic pain     left knee    Diabetes ([facility] [address]     type 2    Hypertension     Nausea & vomiting     Screen for colon cancer 10/2/2014         Past Surgical History:   Procedure Laterality Date    HX BREAST BIOPSY Right     yrs ago    HX CATARACT REMOVAL  2010    bilateral eyes    HX CHOLECYSTECTOMY      HX HYSTERECTOMY  1990    fibroid    HX KNEE ARTHROSCOPY  1999    left knee replacement    HX ORTHOPAEDIC      age 40- right foot    HX ORTHOPAEDIC  9/11/2015    left wrist surgery    HX OTHER SURGICAL  2005    permanent teeth replacements    HX OTHER SURGICAL Right 2003    melonoma removed rt leg    HX WISDOM TEETH EXTRACTION      VT COLONOSCOPY FLX DX W/COLLJ SPEC WHEN PFRMD  2007    date and md unknown         Family History   Problem Relation Age of Onset    No Known Problems Mother     No Known Problems Father     Thyroid Disease Sister        Social History   Substance Use Topics    Smoking status: Never Smoker    Smokeless tobacco: Never Used    Alcohol use No          Lab Results   Component Value Date/Time    WBC 3.6 05/01/2017 12:07 PM    Hemoglobin (POC) 15.3 12/27/2009 07:16 AM    HGB 14.0 05/01/2017 12:07 PM    Hematocrit (POC) 45 12/27/2009 07:16 AM    HCT 40.6 05/01/2017 12:07 PM    PLATELET 649 41/53/9120 12:07 PM    MCV 90 05/01/2017 12:07 PM       Lab Results   Component Value Date/Time    Cholesterol, total 209 05/01/2017 12:07 PM    HDL Cholesterol 71 05/01/2017 12:07 PM    LDL, calculated 119 05/01/2017 12:07 PM    Triglyceride 97 05/01/2017 12:07 PM    CHOL/HDL Ratio 4.3 12/27/2009 07:40 AM       Lab Results   Component Value Date/Time    Sodium 142 09/05/2017 09:57 AM    Potassium 4.1 09/05/2017 09:57 AM    Chloride 101 09/05/2017 09:57 AM    CO2 25 09/05/2017 09:57 AM    Anion gap 9 10/22/2016 12:46 PM    Glucose 144 09/05/2017 09:57 AM    BUN 10 09/05/2017 09:57 AM    Creatinine 0.55 09/05/2017 09:57 AM    BUN/Creatinine ratio 18 09/05/2017 09:57 AM    GFR est  09/05/2017 09:57 AM    GFR est non-AA 95 09/05/2017 09:57 AM    Calcium 9.7 09/05/2017 09:57 AM    Bilirubin, total 0.3 05/01/2017 12:07 PM    ALT (SGPT) 10 05/01/2017 12:07 PM    AST (SGOT) 12 05/01/2017 12:07 PM    Alk. phosphatase 68 05/01/2017 12:07 PM    Protein, total 7.3 05/01/2017 12:07 PM    Albumin 4.7 05/01/2017 12:07 PM    Globulin 4.2 10/22/2016 12:46 PM    A-G Ratio 1.8 05/01/2017 12:07 PM      Lab Results   Component Value Date/Time    Hemoglobin A1c 6.8 02/24/2014 09:00 AM    Hemoglobin A1c (POC) 6.5 09/05/2017 09:57 AM         Review of Systems   Constitutional: Negative for malaise/fatigue. HENT: Negative for congestion. Eyes: Negative for blurred vision. Respiratory: Negative for cough and shortness of breath. Cardiovascular: Negative for chest pain, palpitations and leg swelling.    Gastrointestinal: Negative for abdominal pain, constipation and heartburn. Genitourinary: Negative for dysuria, frequency and urgency. Musculoskeletal: Negative for back pain and joint pain. Neurological: Negative for dizziness, tingling and headaches. Endo/Heme/Allergies: Positive for environmental allergies. Psychiatric/Behavioral: Negative for depression. The patient does not have insomnia. Physical Exam   Constitutional: She appears well-developed and well-nourished. /72  Pulse 74  Temp 97.5 °F (36.4 °C) (Oral)   Resp 16  Ht 5' 2.5\" (1.588 m)  Wt 180 lb (81.6 kg)  LMP 05/10/1990  SpO2 99%  BMI 32.4 kg/m2       HENT:   Right Ear: Tympanic membrane and ear canal normal.   Left Ear: Tympanic membrane and ear canal normal.   Nose: No mucosal edema or rhinorrhea. Mouth/Throat: Oropharynx is clear and moist and mucous membranes are normal.   Neck: Normal range of motion. Neck supple. No thyromegaly present. Cardiovascular: Normal rate and regular rhythm. No murmur heard. Pulmonary/Chest: Effort normal and breath sounds normal.   Abdominal: Soft. Bowel sounds are normal. There is no tenderness. Musculoskeletal: Normal range of motion. She exhibits no edema. Lymphadenopathy:     She has no cervical adenopathy. Skin: Skin is warm and dry. Psychiatric: She has a normal mood and affect. Nursing note and vitals reviewed. ASSESSMENT and PLAN  Diagnoses and all orders for this visit:    1. Essential hypertension  Discussed sodium restriction, high k rich diet, maintaining ideal body weight and regular exercise program such as daily walking 30 min perday 4-5 times per week, as physiologic means to achieve blood pressure control.  Medication compliance advised.      2. Type 2 diabetes mellitus without complication, without long-term current use of insulin ([facility])  a1c level at 6.8%  -     AMB POC HEMOGLOBIN A1C  -     AMB POC GLUCOSE, QUANTITATIVE, BLOOD  -     MICROALBUMIN, UR, RAND W/ MICROALBUMIN/CREA RATIO  - AMB POC URINALYSIS DIP STICK AUTO W/ MICRO    3. Mixed hyperlipidemia  -     LIPID PANEL    4. Encounter for medication monitoring  -     METABOLIC PANEL, COMPREHENSIVE      Follow-up Disposition:  Return in about 4 months (around 5/16/2018) for physical.  current treatment plan is effective, no change in therapy  reviewed diet, exercise and weight control  cardiovascular risk and specific lipid/LDL goals reviewed  reviewed medications and side effects in detail  specific diabetic recommendations: low cholesterol diet, weight control and daily exercise discussed, home glucose monitoring emphasized, all medications, side effects and compliance discussed carefully, foot care discussed and Podiatry visits discussed, annual eye examinations at Ophthalmology discussed and glycohemoglobin and other lab monitoring discussed     I have discussed diagnosis listed in this note with pt and/or family. I have discussed treatment plans and options and the risk/benefit analysis of those options, including safe use of medications and possible medication side effects. Through the use of shared decision making we have agreed to the above plan. The patient has received an after-visit summary and questions were answered concerning future plans and follow up. Advise pt of any urgent changes then to proceed to the ER.

## 2018-01-16 NOTE — PROGRESS NOTES
Omar Sorensen is a 71 y.o. female  Chief Complaint   Patient presents with    Diabetes    Hypertension     Visit Vitals    /68 (BP 1 Location: Right arm, BP Patient Position: Sitting)    Pulse 74    Temp 97.5 °F (36.4 °C) (Oral)    Resp 16    Ht 5' 2.5\" (1.588 m)    Wt 180 lb (81.6 kg)    SpO2 99%    BMI 32.4 kg/m2     1. Have you been to the ER, urgent care clinic since your last visit? Hospitalized since your last visit?no    2. Have you seen or consulted any other health care providers outside of the [address] [place] [address] since your last visit? Include any pap smears or colon screening.  no

## 2018-01-17 RX ORDER — ATORVASTATIN CALCIUM 10 MG/1
10 TABLET, FILM COATED ORAL DAILY
Qty: 30 TAB | Refills: 6 | Status: SHIPPED | OUTPATIENT
Start: 2018-01-17 | End: 2018-01-29 | Stop reason: SDUPTHER

## 2018-01-18 LAB — BACTERIA UR CULT: NO GROWTH

## 2018-01-19 LAB
ALBUMIN SERPL-MCNC: 4.7 G/DL (ref 3.5–4.8)
ALBUMIN/CREAT UR: 9.7 MG/G CREAT (ref 0–30)
ALBUMIN/GLOB SERPL: 2 {RATIO} (ref 1.2–2.2)
ALP SERPL-CCNC: 62 IU/L (ref 39–117)
ALT SERPL-CCNC: 9 IU/L (ref 0–32)
AST SERPL-CCNC: 12 IU/L (ref 0–40)
BILIRUB SERPL-MCNC: 0.4 MG/DL (ref 0–1.2)
BUN SERPL-MCNC: 16 MG/DL (ref 8–27)
BUN/CREAT SERPL: 28 (ref 12–28)
CALCIUM SERPL-MCNC: 9.7 MG/DL (ref 8.7–10.3)
CHLORIDE SERPL-SCNC: 100 MMOL/L (ref 96–106)
CHOLEST SERPL-MCNC: 207 MG/DL (ref 100–199)
CO2 SERPL-SCNC: 24 MMOL/L (ref 18–29)
CREAT SERPL-MCNC: 0.58 MG/DL (ref 0.57–1)
CREAT UR-MCNC: 73.2 MG/DL
GLOBULIN SER CALC-MCNC: 2.3 G/DL (ref 1.5–4.5)
GLUCOSE SERPL-MCNC: 152 MG/DL (ref 65–99)
HDLC SERPL-MCNC: 63 MG/DL
INTERPRETATION, 910389: NORMAL
LDLC SERPL CALC-MCNC: 130 MG/DL (ref 0–99)
Lab: NORMAL
MICROALBUMIN UR-MCNC: 7.1 UG/ML
POTASSIUM SERPL-SCNC: 3.8 MMOL/L (ref 3.5–5.2)
PROT SERPL-MCNC: 7 G/DL (ref 6–8.5)
SODIUM SERPL-SCNC: 142 MMOL/L (ref 134–144)
TRIGL SERPL-MCNC: 70 MG/DL (ref 0–149)
VLDLC SERPL CALC-MCNC: 14 MG/DL (ref 5–40)

## 2018-03-01 ENCOUNTER — APPOINTMENT (OUTPATIENT)
Dept: CT IMAGING | Age: 73
End: 2018-03-01
Attending: EMERGENCY MEDICINE
Payer: MEDICARE

## 2018-03-01 ENCOUNTER — HOSPITAL ENCOUNTER (EMERGENCY)
Age: 73
Discharge: HOME | End: 2018-03-01
Attending: EMERGENCY MEDICINE
Payer: MEDICARE

## 2018-03-01 VITALS
HEIGHT: 63 IN | SYSTOLIC BLOOD PRESSURE: 161 MMHG | DIASTOLIC BLOOD PRESSURE: 88 MMHG | BODY MASS INDEX: 31.89 KG/M2 | RESPIRATION RATE: 18 BRPM | TEMPERATURE: 98.2 F | OXYGEN SATURATION: 100 % | HEART RATE: 74 BPM | WEIGHT: 180 LBS

## 2018-03-01 DIAGNOSIS — M54.50 ACUTE LEFT-SIDED LOW BACK PAIN WITHOUT SCIATICA: Primary | ICD-10-CM

## 2018-03-01 DIAGNOSIS — Z91.09 ENVIRONMENTAL ALLERGIES: ICD-10-CM

## 2018-03-01 LAB
APPEARANCE UR: CLEAR
BACTERIA URNS QL MICRO: ABNORMAL /HPF
BILIRUB UR QL: NEGATIVE
COLOR UR: ABNORMAL
EPITH CASTS URNS QL MICRO: ABNORMAL /LPF
GLUCOSE UR STRIP.AUTO-MCNC: NEGATIVE MG/DL
HGB UR QL STRIP: NEGATIVE
KETONES UR QL STRIP.AUTO: NEGATIVE MG/DL
LEUKOCYTE ESTERASE UR QL STRIP.AUTO: NEGATIVE
NITRITE UR QL STRIP.AUTO: NEGATIVE
PH UR STRIP: 8 [PH] (ref 5–8)
PROT UR STRIP-MCNC: NEGATIVE MG/DL
RBC #/AREA URNS HPF: ABNORMAL /HPF (ref 0–5)
SP GR UR REFRACTOMETRY: 1.01 (ref 1–1.03)
UA: UC IF INDICATED,UAUC: ABNORMAL
UROBILINOGEN UR QL STRIP.AUTO: 1 EU/DL (ref 0.2–1)
WBC URNS QL MICRO: ABNORMAL /HPF (ref 0–4)

## 2018-03-01 PROCEDURE — 99283 EMERGENCY DEPT VISIT LOW MDM: CPT

## 2018-03-01 PROCEDURE — 87086 URINE CULTURE/COLONY COUNT: CPT | Performed by: EMERGENCY MEDICINE

## 2018-03-01 PROCEDURE — 96372 THER/PROPH/DIAG INJ SC/IM: CPT

## 2018-03-01 PROCEDURE — 74176 CT ABD & PELVIS W/O CONTRAST: CPT

## 2018-03-01 PROCEDURE — 74011250637 HC RX REV CODE- 250/637: Performed by: EMERGENCY MEDICINE

## 2018-03-01 PROCEDURE — 74011250636 HC RX REV CODE- 250/636: Performed by: EMERGENCY MEDICINE

## 2018-03-01 PROCEDURE — 81001 URINALYSIS AUTO W/SCOPE: CPT | Performed by: EMERGENCY MEDICINE

## 2018-03-01 RX ORDER — FLUTICASONE PROPIONATE 50 MCG
SPRAY, SUSPENSION (ML) NASAL
Qty: 48 G | Refills: 3 | Status: SHIPPED | OUTPATIENT
Start: 2018-03-01 | End: 2019-10-06 | Stop reason: SDUPTHER

## 2018-03-01 RX ORDER — IBUPROFEN 800 MG/1
800 TABLET ORAL
Qty: 20 TAB | Refills: 0 | Status: SHIPPED | OUTPATIENT
Start: 2018-03-01 | End: 2020-03-17 | Stop reason: SDUPTHER

## 2018-03-01 RX ORDER — KETOROLAC TROMETHAMINE 30 MG/ML
60 INJECTION, SOLUTION INTRAMUSCULAR; INTRAVENOUS
Status: COMPLETED | OUTPATIENT
Start: 2018-03-01 | End: 2018-03-01

## 2018-03-01 RX ORDER — HYDROCODONE BITARTRATE AND ACETAMINOPHEN 10; 325 MG/1; MG/1
1 TABLET ORAL
Qty: 20 TAB | Refills: 0 | Status: SHIPPED | OUTPATIENT
Start: 2018-03-01 | End: 2018-04-17

## 2018-03-01 RX ORDER — TRAMADOL HYDROCHLORIDE 50 MG/1
50 TABLET ORAL
Status: COMPLETED | OUTPATIENT
Start: 2018-03-01 | End: 2018-03-01

## 2018-03-01 RX ADMIN — TRAMADOL HYDROCHLORIDE 50 MG: 50 TABLET, COATED ORAL at 08:56

## 2018-03-01 RX ADMIN — KETOROLAC TROMETHAMINE 60 MG: 30 INJECTION, SOLUTION INTRAMUSCULAR; INTRAVENOUS at 07:26

## 2018-03-01 NOTE — DISCHARGE INSTRUCTIONS

## 2018-03-01 NOTE — ED NOTES
Discharge instructions and prescriptions reviewed with patient. Patient verbalizes understanding and denies any questions. Patient alert and oriented and ambulatory out of ED in no apparent distress. Patient declined wheelchair.

## 2018-03-01 NOTE — ED NOTES
Patient has been instructed that they have been given Ultram* which contains opioids, benzodiazepines, or other sedating drugs. Patient is aware that they  will need to refrain from driving or operating heavy machinery after taking this medication. Patient also instructed that they need to avoid drinking alcohol and using other products containing opioids, benzodiazepines, or other sedating drugs. Patient verbalized understanding.

## 2018-03-01 NOTE — ED PROVIDER NOTES
EMERGENCY DEPARTMENT HISTORY AND PHYSICAL EXAM      Date: 3/1/2018  Patient Name: Shavon Hazel    History of Presenting Illness     Chief Complaint   Patient presents with    Flank Pain       History Provided By: Patient    HPI: Shavon Hazel, 71 y.o. female with PMHx significant for HTN, DM, arthritis, presents ambulatory to the ED with cc of a sudden onset of a constant severe aching L lower back pain/flank pain shortly after waking yesterday. Pt states the pain intermittently radiates the mid lower back and to the abdomen. Pt notes the pain is worse with movement. She rates current pain a 10/10. She denies any numbness/tingling, weakness, difficulty urinating, or incontinence. She denies any hx of similar pain in the past. She denies any hx of kidney related illnesses. Pt denies any injuries or trauma. She denies other sxs including fevers, sore throat, rhinorrhea, cough, SOB, CP, nausea, vomiting, dysuria, hematuria, HA, dizziness, and lightheadedness. Social hx: -Tobacco, -EtOH, -Drugs    PCP: Tristan Hou MD    There are no other complaints, changes, or physical findings at this time. Current Outpatient Prescriptions   Medication Sig Dispense Refill    ibuprofen (MOTRIN) 800 mg tablet Take 1 Tab by mouth every six (6) hours as needed for Pain for up to 7 days. 20 Tab 0    HYDROcodone-acetaminophen (NORCO)  mg tablet Take 1 Tab by mouth every six (6) hours as needed for Pain. Max Daily Amount: 4 Tabs.  20 Tab 0    atorvastatin (LIPITOR) 10 mg tablet TAKE 1 TABLET BY MOUTH EVERY DAY 90 Tab 3    metFORMIN (GLUCOPHAGE) 500 mg tablet TAKE 2 TABLETS EVERY MORNING AND TAKE 1 TABLET WITH DINNER 270 Tab 3    losartan-hydroCHLOROthiazide (HYZAAR) 100-12.5 mg per tablet TAKE 1 TABLET EVERY DAY 90 Tab 3    fluticasone (FLONASE) 50 mcg/actuation nasal spray SHAKE LIQUID AND USE 2 SPRAYS IN EACH NOSTRIL DAILY AS NEEDED FOR RHINITIS 48 g 3    aspirin delayed-release 81 mg tablet Take 1 Tab by mouth daily. 30 Tab 6    amLODIPine (NORVASC) 10 mg tablet Take 10 mg by mouth daily.  TRUE METRIX GLUCOSE TEST STRIP strip TEST BLOOD SUGAR TWICE DAILY  AND AS NEEDED 250 Strip 3    loratadine (CLARITIN) 10 mg tablet Take 1 tablet by mouth daily as needed for Allergies. 30 tablet 11    Lancets (PRODIGY TWIST TOP LANCET) misc Use to check blood glucose level twice a day and PRN. 200 Each 3    alcohol swabs (BD SINGLE USE SWABS REGULAR) padm Use to cleanse finger prior to checking glucose level. 200 Each 3    Cholecalciferol, Vitamin D3, (VITAMIN D3) 5,000 unit Tab Take 1 Cap by mouth daily.  Omega-3-DHA-EPA-Fish Oil 1,000 (120-180) mg Cap Take 2 Caps by mouth every seven (7) days.  albuterol (PROVENTIL HFA, VENTOLIN HFA, PROAIR HFA) 90 mcg/actuation inhaler Take 2 Puffs by inhalation every four (4) hours as needed for Wheezing. 1 Inhaler 0    meloxicam (MOBIC) 15 mg tablet Take 15 mg by mouth as needed for Pain.          Past History     Past Medical History:  Past Medical History:   Diagnosis Date    Arthritis     knee    Cancer ([facility] [address] 2003    melanoma -right lower extremity    Chronic pain     left knee    Diabetes ([facility] [address]     type 2    Hypertension     Nausea & vomiting     Screen for colon cancer 10/2/2014       Past Surgical History:  Past Surgical History:   Procedure Laterality Date    HX BREAST BIOPSY Right     yrs ago    HX CATARACT REMOVAL  2010    bilateral eyes    HX CHOLECYSTECTOMY      HX HYSTERECTOMY  1990    fibroid    HX KNEE ARTHROSCOPY  1999    left knee replacement    HX ORTHOPAEDIC      age 40- right foot    HX ORTHOPAEDIC  9/11/2015    left wrist surgery    HX OTHER SURGICAL  2005    permanent teeth replacements    HX OTHER SURGICAL Right 2003    melonoma removed rt leg    HX WISDOM TEETH EXTRACTION      CT COLONOSCOPY FLX DX W/COLLJ SPEC WHEN PFRMD  2007    date and md unknown       Family History:  Family History   Problem Relation Age of Onset    No Known Problems Mother     No Known Problems Father     Thyroid Disease Sister        Social History:  Social History   Substance Use Topics    Smoking status: Never Smoker    Smokeless tobacco: Never Used    Alcohol use No       Allergies:  No Known Allergies      Review of Systems   Review of Systems   Constitutional: Negative for fever. HENT: Negative for sore throat. Eyes: Negative for photophobia and redness. Respiratory: Negative for shortness of breath and wheezing. Cardiovascular: Negative for chest pain and leg swelling. Gastrointestinal: Positive for abdominal pain. Negative for blood in stool, nausea and vomiting. Negative for incontinence. Genitourinary: Positive for flank pain (L). Negative for difficulty urinating, dysuria, hematuria, menstrual problem and vaginal bleeding. Negative for incontinence. Musculoskeletal: Positive for back pain (left lower). Negative for joint swelling. Neurological: Negative for dizziness, seizures, syncope, speech difficulty, weakness, numbness and headaches. Hematological: Negative for adenopathy. Psychiatric/Behavioral: Negative for agitation, confusion and suicidal ideas. The patient is not nervous/anxious. Physical Exam   Physical Exam   Constitutional: She is oriented to person, place, and time. She appears well-developed and well-nourished. No distress. HENT:   Head: Normocephalic and atraumatic. Mouth/Throat: Oropharynx is clear and moist. No oropharyngeal exudate. Eyes: Conjunctivae and EOM are normal. Pupils are equal, round, and reactive to light. Left eye exhibits no discharge. Neck: Normal range of motion. Neck supple. No JVD present. Cardiovascular: Normal rate, regular rhythm, normal heart sounds and intact distal pulses. Pulmonary/Chest: Effort normal and breath sounds normal. No respiratory distress. She has no wheezes. Abdominal: Soft. Bowel sounds are normal. She exhibits no distension. There is no tenderness. There is CVA tenderness (L). There is no rebound and no guarding. Musculoskeletal: Normal range of motion. She exhibits no edema. Lymphadenopathy:     She has no cervical adenopathy. Neurological: She is alert and oriented to person, place, and time. She has normal reflexes. No cranial nerve deficit. Negative SLR BL. Skin: Skin is warm and dry. No rash noted. Psychiatric: She has a normal mood and affect. Her behavior is normal.   Nursing note and vitals reviewed. Diagnostic Study Results     Labs -     Recent Results (from the past 12 hour(s))   URINALYSIS W/ REFLEX CULTURE    Collection Time: 03/01/18  7:24 AM   Result Value Ref Range    Color YELLOW/STRAW      Appearance CLEAR CLEAR      Specific gravity 1.010 1.003 - 1.030      pH (UA) 8.0 5.0 - 8.0      Protein NEGATIVE  NEG mg/dL    Glucose NEGATIVE  NEG mg/dL    Ketone NEGATIVE  NEG mg/dL    Bilirubin NEGATIVE  NEG      Blood NEGATIVE  NEG      Urobilinogen 1.0 0.2 - 1.0 EU/dL    Nitrites NEGATIVE  NEG      Leukocyte Esterase NEGATIVE  NEG      WBC 0-4 0 - 4 /hpf    RBC 0-5 0 - 5 /hpf    Epithelial cells FEW FEW /lpf    Bacteria 1+ (A) NEG /hpf    UA:UC IF INDICATED URINE CULTURE ORDERED (A) CNI         Radiologic Studies -     CT Results  (Last 48 hours)               03/01/18 0855  CT ABD PELV WO CONT Final result    Impression:  IMPRESSION:    1. No renal or ureteral calculus or other acute abdominal or pelvic abnormality. 2. Status post cholecystectomy. 3. Status post hysterectomy. Narrative:  EXAM: CT ABDOMEN AND PELVIS WITHOUT CONTRAST   INDICATION:  Left flank pain. COMPARISON: 8/19/2014. CONTRAST: None. TECHNIQUE:    Unenhanced multislice helical CT was performed from the diaphragm to the   symphysis pubis without intravenous contrast administration. Oral contrast was   not administered.  Contiguous 5 mm axial images were reconstructed and lung and   soft tissue windows were generated. Coronal and sagittal reformations were   generated. CT dose reduction was achieved through use of a standardized protocol   tailored for this examination and automatic exposure control for dose   modulation. FINDINGS:   LOWER CHEST: The visualized portions of the lung bases are clear. The absence of intravenous contrast material reduces the sensitivity for   evaluation of the solid parenchymal organs of the abdomen. ABDOMEN:   Liver: The liver is normal in size and contour with no focal abnormality. Gallbladder and bile ducts: There are clips in the gallbladder fossa and the   gallbladder is absent. Spleen: No abnormality. Pancreas: No abnormality. Adrenal glands: No abnormality. Kidneys: No focal parenchymal abnormality. There is no renal or ureteral   calculus or obstruction. PELVIS:   Reproductive organs: The uterus is absent. Bladder: No abnormality. RETROPERITONEUM: The aorta tapers without aneurysm. There is no retroperitoneal   adenopathy or mass. There is no pelvic mass or adenopathy. BOWEL AND MESENTERY: The small bowel is normal. The appendix is not identified. There is no inflammatory process adjacent to the cecum. PERITONEUM: There is no ascites or free intraperitoneal air. BONES AND SOFT TISSUES: The patient is obese. The bones and soft tissues of the   abdominal wall are within normal limits. Medical Decision Making   I am the first provider for this patient. I reviewed the vital signs, available nursing notes, past medical history, past surgical history, family history and social history. Vital Signs-Reviewed the patient's vital signs. Patient Vitals for the past 12 hrs:   Temp Pulse Resp BP SpO2   03/01/18 0702 98.2 °F (36.8 °C) 74 18 161/88 100 %       Records Reviewed:  Old Medical Records    Provider Notes (Medical Decision Making):   DDx: kidney stones, renal colic, UTI, lumbar radiculopathy     ED Course:   Initial assessment performed. The patients presenting problems have been discussed, and they are in agreement with the care plan formulated and outlined with them. I have encouraged them to ask questions as they arise throughout their visit.    7:52 AM  Reassessed pt. She is reporting feeling somewhat better after toradol injection. Labs have resulted. Ordered CT.    8:46 AM  Pt reports pain has not resolved and reoccurs with movement. Ordering tramadol. Critical Care Time:   0    Disposition:  DISCHARGE NOTE  9:01 AM  The patient has been re-evaluated and is ready for discharge. Reviewed available results with patient. Counseled pt on diagnosis and care plan. Pt has expressed understanding, and all questions have been answered. Pt agrees with plan and agrees to F/U as recommended, or return to the ED if their sxs worsen. Discharge instructions have been provided and explained to the pt, along with reasons to return to the ED. PLAN:  1. Current Discharge Medication List      START taking these medications    Details   ibuprofen (MOTRIN) 800 mg tablet Take 1 Tab by mouth every six (6) hours as needed for Pain for up to 7 days. Qty: 20 Tab, Refills: 0      HYDROcodone-acetaminophen (NORCO)  mg tablet Take 1 Tab by mouth every six (6) hours as needed for Pain. Max Daily Amount: 4 Tabs. Qty: 20 Tab, Refills: 0    Associated Diagnoses: Acute left-sided low back pain without sciatica           2. Follow-up Information     Follow up With Details 82 Vida Humphreys MD In 1 week  [address] [facility]  [phone]          Return to ED if worse     Diagnosis     Clinical Impression:   1. Acute left-sided low back pain without sciatica        Attestations:     This note is prepared by Ayla Ochoa, acting as Scribe for Payton Caldera MD.    The scribe's documentation has been prepared under my direction and personally reviewed by me in its entirety. I confirm that the note above accurately reflects all work, treatment, procedures, and medical decision making performed by me.   Rosa Haque MD

## 2018-03-01 NOTE — TELEPHONE ENCOUNTER
Patient would like Barbra Ramires to call in some Flonase (generic) for her to [employer] and [place] . Patient can be reached at [phone].

## 2018-03-01 NOTE — ED NOTES
Pt presents to the ED with complaints of lower back pain on her left side X1 day. Pt states \" I was getting ready this morning and then my back started hurting, and it sometimes moves around to my left side. \" Pt denies lifting anything heavy. Pt reports no n/v/d. Pt reports no hx of kidney problems. Pt states \"About a week ago I was doing a lot of yard work, and then this past week I had some congestion and took some allergy medications. \"      Emergency Department Nursing Plan of Care       The Nursing Plan of Care is developed from the Nursing assessment and Emergency Department Attending provider initial evaluation. The plan of care may be reviewed in the ED Provider note.     The Plan of Care was developed with the following considerations:   Patient / Family readiness to learn indicated by:verbalized understanding  Persons(s) to be included in education: patient  Barriers to Learning/Limitations:No    Signed     Willard Flax    3/1/2018   7:08 AM

## 2018-03-02 LAB
BACTERIA SPEC CULT: NORMAL
CC UR VC: NORMAL
SERVICE CMNT-IMP: NORMAL

## 2018-04-17 ENCOUNTER — OFFICE VISIT (OUTPATIENT)
Dept: FAMILY MEDICINE CLINIC | Age: 73
End: 2018-04-17

## 2018-04-17 VITALS
HEIGHT: 63 IN | SYSTOLIC BLOOD PRESSURE: 138 MMHG | BODY MASS INDEX: 31.5 KG/M2 | OXYGEN SATURATION: 100 % | TEMPERATURE: 97.4 F | RESPIRATION RATE: 16 BRPM | DIASTOLIC BLOOD PRESSURE: 77 MMHG | HEART RATE: 78 BPM | WEIGHT: 177.8 LBS

## 2018-04-17 DIAGNOSIS — J06.9 UPPER RESPIRATORY TRACT INFECTION, UNSPECIFIED TYPE: Primary | ICD-10-CM

## 2018-04-17 DIAGNOSIS — J98.01 BRONCHOSPASM: ICD-10-CM

## 2018-04-17 DIAGNOSIS — R52 BODY ACHES: ICD-10-CM

## 2018-04-17 LAB
FLUAV+FLUBV AG NOSE QL IA.RAPID: NEGATIVE POS/NEG
FLUAV+FLUBV AG NOSE QL IA.RAPID: NEGATIVE POS/NEG
VALID INTERNAL CONTROL?: YES

## 2018-04-17 RX ORDER — PREDNISONE 10 MG/1
10 TABLET ORAL 2 TIMES DAILY
Qty: 10 TAB | Refills: 0 | Status: SHIPPED | OUTPATIENT
Start: 2018-04-17 | End: 2018-04-22

## 2018-04-17 RX ORDER — AZITHROMYCIN 250 MG/1
TABLET, FILM COATED ORAL
Qty: 6 TAB | Refills: 0 | Status: SHIPPED | OUTPATIENT
Start: 2018-04-17 | End: 2018-05-16 | Stop reason: ALTCHOICE

## 2018-04-17 RX ORDER — PROMETHAZINE HYDROCHLORIDE AND CODEINE PHOSPHATE 6.25; 1 MG/5ML; MG/5ML
1 SOLUTION ORAL
Qty: 240 ML | Refills: 0 | Status: SHIPPED | OUTPATIENT
Start: 2018-04-17 | End: 2018-05-16

## 2018-04-17 NOTE — PROGRESS NOTES
Chief Complaint   Patient presents with    Generalized Body Aches     x 4 days    Cough     pt c/o coughing up yellow sputum at times x 4 days    Hoarse     x 4 days

## 2018-04-17 NOTE — MR AVS SNAPSHOT
[address] [facility] [address] 
 
 
 [address] [id number] [phone] Patient: Cinda Moya MRN: [id number] GVN:[id number] Visit Information Date & Time Provider Department Dept. Phone Encounter #  
 4/17/2018  2:00 PM Maru Meadows MD [facility] [phone] [id number] Follow-up Instructions Return if symptoms worsen or fail to improve. Your Appointments 5/16/2018  9:15 AM  
Complete Physical with Maru Meadows MD  
[facility]) Appt Note: complete physical  
 [address] [id number] [phone] [address] [facility] [address] Upcoming Health Maintenance Date Due  
 FOOT EXAM Q1 5/1/2018 EYE EXAM RETINAL OR DILATED Q1 5/17/2018* MEDICARE YEARLY EXAM 5/2/2018 HEMOGLOBIN A1C Q6M 7/16/2018 MICROALBUMIN Q1 1/16/2019 LIPID PANEL Q1 1/16/2019 GLAUCOMA SCREENING Q2Y 2/1/2019 BREAST CANCER SCRN MAMMOGRAM 12/4/2019 DTaP/Tdap/Td series (2 - Td) 2/10/2022 COLONOSCOPY 3/4/2026 *Topic was postponed. The date shown is not the original due date. Allergies as of 4/17/2018  Review Complete On: 4/17/2018 By: Maru Meadows MD  
 No Known Allergies Current Immunizations  Reviewed on 1/16/2018 Name Date Influenza High Dose Vaccine PF 9/5/2017, 1/24/2017, 12/22/2015 Influenza Vaccine 10/15/2014, 10/21/2013 Influenza Vaccine Split 12/17/2012 Pneumococcal Conjugate (PCV-13) 7/14/2015 TDAP Vaccine 2/10/2012 ZZZ-RETIRED (DO NOT USE) Pneumococcal Vaccine (Unspecified Type) 2/10/2012 Not reviewed this visit You Were Diagnosed With   
  
 Codes Comments Upper respiratory tract infection, unspecified type    -  Primary ICD-10-CM: J06.9 ICD-9-CM: 465.9 Body aches     ICD-10-CM: R52 ICD-9-CM: 780.96 Vitals BP Pulse Temp Resp Height(growth percentile) Weight(growth percentile) 138/77 (BP 1 Location: Left arm, BP Patient Position: Sitting) 78 97.4 °F (36.3 °C) (Oral) 16 5' 3\" (1.6 m) 177 lb 12.8 oz (80.6 kg) LMP SpO2 PF BMI OB Status Smoking Status 05/10/1990 100% 280 L/min 31.5 kg/m2 Hysterectomy Never Smoker Vitals History BMI and BSA Data Body Mass Index Body Surface Area 31.5 kg/m 2 1.89 m 2 Preferred Pharmacy Pharmacy Name Phone [facility] [address] [facility] [facility]  [place] [phone] Your Updated Medication List  
  
   
This list is accurate as of 4/17/18  2:17 PM.  Always use your most recent med list.  
  
  
  
  
 albuterol 90 mcg/actuation inhaler Commonly known as:  PROVENTIL HFA, VENTOLIN HFA, PROAIR HFA Take 2 Puffs by inhalation every four (4) hours as needed for Wheezing. alcohol swabs Padm Commonly known as:  BD Single Use Swabs Regular Use to cleanse finger prior to checking glucose level. aspirin delayed-release 81 mg tablet Take 1 Tab by mouth daily. atorvastatin 10 mg tablet Commonly known as:  LIPITOR  
TAKE 1 TABLET BY MOUTH EVERY DAY  
  
 azithromycin 250 mg tablet Commonly known as:  Shanelle Romero Take 2 tablets today, then take 1 tablet daily CLARITIN 10 mg tablet Generic drug:  loratadine Take 1 tablet by mouth daily as needed for Allergies. fluticasone 50 mcg/actuation nasal spray Commonly known as:  Arlys Pock SHAKE LIQUID AND USE 2 SPRAYS IN EACH NOSTRIL DAILY AS NEEDED FOR RHINITIS Lancets Misc Commonly known as:  PRODIGY TWIST TOP LANCET Use to check blood glucose level twice a day and PRN. losartan-hydroCHLOROthiazide 100-12.5 mg per tablet Commonly known as:  HYZAAR  
TAKE 1 TABLET EVERY DAY  
  
 meloxicam 15 mg tablet Commonly known as:  MOBIC Take 15 mg by mouth as needed for Pain. metFORMIN 500 mg tablet Commonly known as:  GLUCOPHAGE  
TAKE 2 TABLETS EVERY MORNING AND TAKE 1 TABLET WITH DINNER  
  
 NORVASC 10 mg tablet Generic drug:  amLODIPine Take 5 mg by mouth daily. omega 3-DHA-EPA-fish oil 1,000 mg (120 mg-180 mg) capsule Take 2 Caps by mouth every seven (7) days. predniSONE 10 mg tablet Commonly known as:  Cabrera Aver Take 1 Tab by mouth two (2) times a day for 5 days. promethazine-codeine 6.25-10 mg/5 mL syrup Commonly known as:  PHENERGAN with CODEINE Take 5 mL by mouth every six (6) hours as needed for Cough. Max Daily Amount: 20 mL. TRUE METRIX GLUCOSE TEST STRIP strip Generic drug:  glucose blood VI test strips TEST BLOOD SUGAR TWICE DAILY  AND AS NEEDED  
  
 VITAMIN D3 5,000 unit Tab tablet Generic drug:  cholecalciferol (VITAMIN D3) Take 1 Cap by mouth daily. Prescriptions Printed Refills  
 promethazine-codeine (PHENERGAN WITH CODEINE) 6.25-10 mg/5 mL syrup 0 Sig: Take 5 mL by mouth every six (6) hours as needed for Cough. Max Daily Amount: 20 mL. Class: Print Route: Oral  
  
Prescriptions Sent to Pharmacy Refills  
 azithromycin (ZITHROMAX) 250 mg tablet 0 Sig: Take 2 tablets today, then take 1 tablet daily Class: Normal  
 Pharmacy: [facility] [address] AT [address] [place] [address] Ph #: [phone]  
 predniSONE (DELTASONE) 10 mg tablet 0 Sig: Take 1 Tab by mouth two (2) times a day for 5 days. Class: Normal  
 Pharmacy: [facility] [address] Ph #: [phone] Route: Oral  
  
We Performed the Following AMB POC NAMAN INFLUENZA A/B TEST [49153 CPT(R)] Follow-up Instructions Return if symptoms worsen or fail to improve. Introducing [facility]!    
 [facility] introduces [employer] patient portal. Now you can access parts of your medical record, email your doctor's office, and request medication refills online. 1. In your internet browser, go to [email or web address] 2. Click on the First Time User? Click Here link in the Sign In box. You will see the New Member Sign Up page. 3. Enter your wiseri Access Code exactly as it appears below. You will not need to use this code after youve completed the sign-up process. If you do not sign up before the expiration date, you must request a new code. · [employer] Access Code: [email or web address] Expires: 6/11/2018  5:31 AM 
 
4. Enter the last four digits of your Social Security Number (xxxx) and Date of Birth (mm/dd/yyyy) as indicated and click Submit. You will be taken to the next sign-up page. 5. Create a wiseri ID. This will be your wiseri login ID and cannot be changed, so think of one that is secure and easy to remember. 6. Create a [employer]i password. You can change your password at any time. 7. Enter your Password Reset Question and Answer. This can be used at a later time if you forget your password. 8. Enter your e-mail address. You will receive e-mail notification when new information is available in [address] 9. Click Sign Up. You can now view and download portions of your medical record. 10. Click the Download Summary menu link to download a portable copy of your medical information. If you have questions, please visit the Frequently Asked Questions section of the [employer]i website. Remember, wiseri is NOT to be used for urgent needs. For medical emergencies, dial 911. Now available from your iPhone and Android! Please provide this summary of care documentation to your next provider. Your primary care clinician is listed as Moose Roberts. If you have any questions after today's visit, please call [phone].

## 2018-04-17 NOTE — PROGRESS NOTES
HISTORY OF PRESENT ILLNESS  Desirae Gann is a 68 y.o. female. HPI   C/o malaise and body aches for the past few days. Also some nasal congestion and dry cough. Has not had fever or chills noted. Throat is scratchy. Has post nasal drainage. Intermittent headache. No chest pain or SOB. Has increase cough with wheezing noted at night. Reviewed PmHx, RxHx, FmHx, SocHx, AllgHx and updated and dated in the chart. ROS    Physical Exam   Constitutional: She appears well-developed and well-nourished. /77 (BP 1 Location: Left arm, BP Patient Position: Sitting)  Pulse 78  Temp 97.4 °F (36.3 °C) (Oral)   Resp 16  Ht 5' 3\" (1.6 m)  Wt 177 lb 12.8 oz (80.6 kg)  LMP 05/10/1990  SpO2 100%   L/min  BMI 31.5 kg/m2     HENT:   Right Ear: Tympanic membrane and ear canal normal.   Left Ear: Tympanic membrane and ear canal normal.   Nose: Mucosal edema and rhinorrhea present. Mouth/Throat: Mucous membranes are normal. Posterior oropharyngeal erythema present. No oropharyngeal exudate. Neck: Trachea normal, normal range of motion and full passive range of motion without pain. Neck supple. No edema present. No thyromegaly present. Cardiovascular: Normal rate and regular rhythm. Pulmonary/Chest: Effort normal and breath sounds normal. She has no wheezes. She has no rales. Abdominal: Soft. Normal appearance and bowel sounds are normal. There is no tenderness. Lymphadenopathy:     She has no cervical adenopathy. Nursing note and vitals reviewed. ASSESSMENT and PLAN  Diagnoses and all orders for this visit:    1. Upper respiratory tract infection, unspecified type  Bronchospasm  -     azithromycin (ZITHROMAX) 250 mg tablet; Take 2 tablets today, then take 1 tablet daily  -     promethazine-codeine (PHENERGAN WITH CODEINE) 6.25-10 mg/5 mL syrup; Take 5 mL by mouth every six (6) hours as needed for Cough. Max Daily Amount: 20 mL. -     predniSONE (DELTASONE) 10 mg tablet;  Take 1 Tab by mouth two (2) times a day for 5 days. 2. Body aches  -     AMB POC NAMAN INFLUENZA A/B TEST  -  Negative       Follow-up Disposition:  Return if symptoms worsen or fail to improve. reviewed medications and side effects in detail    I have discussed diagnosis listed in this note with pt and/or family. I have discussed treatment plans and options and the risk/benefit analysis of those options, including safe use of medications and possible medication side effects. Through the use of shared decision making we have agreed to the above plan. The patient has received an after-visit summary and questions were answered concerning future plans and follow up. Advise pt of any urgent changes then to proceed to the ER.

## 2018-05-16 ENCOUNTER — OFFICE VISIT (OUTPATIENT)
Dept: FAMILY MEDICINE CLINIC | Age: 73
End: 2018-05-16

## 2018-05-16 VITALS
HEIGHT: 63 IN | WEIGHT: 178 LBS | BODY MASS INDEX: 31.54 KG/M2 | HEART RATE: 63 BPM | RESPIRATION RATE: 18 BRPM | SYSTOLIC BLOOD PRESSURE: 129 MMHG | DIASTOLIC BLOOD PRESSURE: 73 MMHG | TEMPERATURE: 97.7 F | OXYGEN SATURATION: 99 %

## 2018-05-16 DIAGNOSIS — R82.90 NONSPECIFIC FINDING ON EXAMINATION OF URINE: ICD-10-CM

## 2018-05-16 DIAGNOSIS — Z79.82 LONG-TERM USE OF ASPIRIN THERAPY: ICD-10-CM

## 2018-05-16 DIAGNOSIS — E11.9 TYPE 2 DIABETES MELLITUS WITHOUT COMPLICATION, WITHOUT LONG-TERM CURRENT USE OF INSULIN (HCC): ICD-10-CM

## 2018-05-16 DIAGNOSIS — E78.2 MIXED HYPERLIPIDEMIA: ICD-10-CM

## 2018-05-16 DIAGNOSIS — Z12.11 ENCOUNTER FOR SCREENING FECAL OCCULT BLOOD TESTING: ICD-10-CM

## 2018-05-16 DIAGNOSIS — Z51.81 ENCOUNTER FOR MEDICATION MONITORING: ICD-10-CM

## 2018-05-16 DIAGNOSIS — Z00.00 ROUTINE GENERAL MEDICAL EXAMINATION AT A HEALTH CARE FACILITY: Primary | ICD-10-CM

## 2018-05-16 DIAGNOSIS — Z23 NEED FOR SHINGLES VACCINE: ICD-10-CM

## 2018-05-16 DIAGNOSIS — I10 ESSENTIAL HYPERTENSION: ICD-10-CM

## 2018-05-16 LAB
BACTERIA UA POCT, BACTPOCT: NORMAL
BILIRUB UR QL STRIP: NEGATIVE
CASTS UA POCT: 0
CLUE CELLS, CLUEPOCT: NEGATIVE
CRYSTALS UA POCT, CRYSPOCT: NEGATIVE
EPITHELIAL CELLS POCT: NORMAL
GLUCOSE POC: 131 MG/DL
GLUCOSE UR-MCNC: NEGATIVE MG/DL
HBA1C MFR BLD HPLC: 6.9 %
HEMOCCULT STL QL: NEGATIVE
KETONES P FAST UR STRIP-MCNC: NEGATIVE MG/DL
MUCUS UA POCT, MUCPOCT: NORMAL
PH UR STRIP: 7 [PH] (ref 4.6–8)
PROT UR QL STRIP: NORMAL
RBC UA POCT, RBCPOCT: 0
SP GR UR STRIP: 1.02 (ref 1–1.03)
TRICH UA POCT, TRICHPOC: NEGATIVE
UA UROBILINOGEN AMB POC: NORMAL (ref 0.2–1)
URINALYSIS CLARITY POC: CLEAR
URINALYSIS COLOR POC: YELLOW
URINE BLOOD POC: NEGATIVE
URINE CULT COMMENT, POCT: NORMAL
URINE LEUKOCYTES POC: NEGATIVE
URINE NITRITES POC: NEGATIVE
VALID INTERNAL CONTROL?: YES
WBC UA POCT, WBCPOCT: NORMAL
YEAST UA POCT, YEASTPOC: NEGATIVE

## 2018-05-16 NOTE — PROGRESS NOTES
This is a Subsequent Medicare Annual Wellness Visit providing Personalized Prevention Plan Services (PPPS) (Performed 12 months after initial AWV and PPPS )    I have reviewed the patient's medical history in detail and updated the computerized patient record. HTN follow up:  Compliant w/ meds, low salt diet, and exercise. No home bp monitoring. No swelling, headache or dizziness. No chest pain, SOB, palpitations. Otherwise feeling well since the last visit. DM type II follow up:  Compliant w/ meds, diabetic diet, and exercise. Obtains home glucose monitoring averaging 100-140. Checks BS BID on most days and prn. Pt does not have BS log at visit today. No Rf needed for today. Denies any tingling sensation, polyuria and polydipsia. No blurred vision. No significant weight changes. Feeling well since last OV. Hypercholesterolemia follow up:  Compliant w/ meds, low fat, low cholesterol diet. Exercising some. No muscle nor abdominal pain, no skin discoloration. Patient fasting today.       History     Past Medical History:   Diagnosis Date    Arthritis     knee    Cancer ([facility] [address] 2003    melanoma -right lower extremity    Chronic pain     left knee    Diabetes ([facility] [address]     type 2    Hypertension     Nausea & vomiting     Screen for colon cancer 10/2/2014      Past Surgical History:   Procedure Laterality Date    HX BREAST BIOPSY Right     yrs ago    HX CATARACT REMOVAL  2010    bilateral eyes    HX CHOLECYSTECTOMY      HX HYSTERECTOMY  1990    fibroid    HX KNEE ARTHROSCOPY  1999    left knee replacement    HX ORTHOPAEDIC      age 40- right foot    HX ORTHOPAEDIC  9/11/2015    left wrist surgery    HX OTHER SURGICAL  2005    permanent teeth replacements    HX OTHER SURGICAL Right 2003    melonoma removed rt leg    HX WISDOM TEETH EXTRACTION      [place] COLONOSCOPY FLX DX W/COLLJ SPEC WHEN PFRMD  2007    date and md unknown     Current Outpatient Prescriptions   Medication Sig Dispense Refill  varicella-zoster recombinant, PF, (SHINGRIX) 50 mcg/0.5 mL susr injection 0.5 mL by IntraMUSCular route once for 1 dose. Repeat second dose in 6 months. 0.5 mL 1    fluticasone (FLONASE) 50 mcg/actuation nasal spray SHAKE LIQUID AND USE 2 SPRAYS IN EACH NOSTRIL DAILY AS NEEDED FOR RHINITIS 48 g 3    atorvastatin (LIPITOR) 10 mg tablet TAKE 1 TABLET BY MOUTH EVERY DAY 90 Tab 3    metFORMIN (GLUCOPHAGE) 500 mg tablet TAKE 2 TABLETS EVERY MORNING AND TAKE 1 TABLET WITH DINNER 270 Tab 3    losartan-hydroCHLOROthiazide (HYZAAR) 100-12.5 mg per tablet TAKE 1 TABLET EVERY DAY 90 Tab 3    aspirin delayed-release 81 mg tablet Take 1 Tab by mouth daily. 30 Tab 6    amLODIPine (NORVASC) 10 mg tablet Take 5 mg by mouth daily.  TRUE METRIX GLUCOSE TEST STRIP strip TEST BLOOD SUGAR TWICE DAILY  AND AS NEEDED 250 Strip 3    albuterol (PROVENTIL HFA, VENTOLIN HFA, PROAIR HFA) 90 mcg/actuation inhaler Take 2 Puffs by inhalation every four (4) hours as needed for Wheezing. 1 Inhaler 0    meloxicam (MOBIC) 15 mg tablet Take 15 mg by mouth as needed for Pain.  loratadine (CLARITIN) 10 mg tablet Take 1 tablet by mouth daily as needed for Allergies. 30 tablet 11    Lancets (PRODIGY TWIST TOP LANCET) misc Use to check blood glucose level twice a day and PRN. 200 Each 3    alcohol swabs (BD SINGLE USE SWABS REGULAR) padm Use to cleanse finger prior to checking glucose level. 200 Each 3    Cholecalciferol, Vitamin D3, (VITAMIN D3) 5,000 unit Tab Take 1 Cap by mouth daily.  Omega-3-DHA-EPA-Fish Oil 1,000 (120-180) mg Cap Take 2 Caps by mouth every seven (7) days.        No Known Allergies  Family History   Problem Relation Age of Onset    No Known Problems Mother     No Known Problems Father     Thyroid Disease Sister      Social History   Substance Use Topics    Smoking status: Never Smoker    Smokeless tobacco: Never Used    Alcohol use No     Patient Active Problem List   Diagnosis Code    Right knee DJD M17.11    Arthritis M19.90    Cancer ([facility] C80.1    Chronic pain G89.29    Hyperlipidemia E78.5    Hypovitaminosis D E55.9    Screen for colon cancer Z12.11    Type 2 diabetes mellitus without complication ([facility] Y75.7    Essential hypertension I10    Encounter for medication monitoring Z51.81       Depression Risk Factor Screening:     PHQ over the last two weeks 5/16/2018   Little interest or pleasure in doing things Not at all   Feeling down, depressed or hopeless Not at all   Total Score PHQ 2 0     Alcohol Risk Factor Screening: On any occasion during the past 3 months, have you had more than 3 drinks containing alcohol? No    Do you average more than 7 drinks per week? No      Functional Ability and Level of Safety:     Hearing Loss   none    Activities of Daily Living   Self-care. Requires assistance with: no ADLs    Fall Risk     Fall Risk Assessment, last 12 mths 5/16/2018   Able to walk? Yes   Fall in past 12 months? No     Functional Ability:   Does the patient exhibit a steady gait? yes    How long did it take the patient to get up and walk from a sitting position? seconds    Is the patient self reliant? (ie can do own laundry, meals, household chores)  yes   Does the patient handle his/her own medications? yes   Does the patient handle his/her own money? yes   Is the patients home safe (ie good lighting, handrails on stairs and bath, etc.)? yes   Did you notice or did patient express any hearing difficulties? no   Did you notice or did patient express any vision difficulties?  no   Were distance and reading eye charts used? yes     Advance Care Planning:   Patient was offered the opportunity to discuss advance care planning:  yes    Does patient have an Advance Directive:  yes    If no, did you provide information on Caring Connections?   NA          Abuse Screen   Patient is not abused    Review of Systems   Constitutional: negative for fatigue and malaise  Eyes: negative for irritation and redness  Ears, nose, mouth, throat, and face: negative for earaches, nasal congestion and hoarseness  Respiratory: negative for cough, sputum or dyspnea on exertion  Cardiovascular: negative for chest pain, chest pressure/discomfort, dyspnea, palpitations, irregular heart beats, fatigue, lower extremity edema  Gastrointestinal: negative for dysphagia, dyspepsia, reflux symptoms, nausea, vomiting, change in bowel habits, melena, constipation and abdominal pain  Genitourinary:negative for frequency, dysuria, nocturia, urinary incontinence   Integument/breast: negative for rash and dryness  Hematologic/lymphatic: negative for easy bruising and lymphadenopathy  Musculoskeletal:negative for myalgias, arthralgias, stiff joints, neck pain, back pain and muscle weakness  Neurological: negative for headaches, dizziness, tremor and weakness  Endocrine: negative for diabetic symptoms including polyuria and polydipsia    Physical Examination     Evaluation of Cognitive Function:  Mood/affect:  happy  Appearance: age appropriate  Family member/caregiver input: NA    Visit Vitals    /73 (BP 1 Location: Left arm, BP Patient Position: Sitting)    Pulse 63    Temp 97.7 °F (36.5 °C) (Oral)    Resp 18    Ht 5' 3\" (1.6 m)    Wt 178 lb (80.7 kg)    LMP 05/10/1990    SpO2 99%    BMI 31.53 kg/m2     General:  Alert, cooperative, no distress, appears stated age. Head:  Normocephalic, without obvious abnormality, atraumatic. Ears:  Normal TMs and external ear canals both ears. Nose: Nares normal. Septum midline. Mucosa normal. No drainage or sinus tenderness. Throat: Lips, mucosa, and tongue normal. Teeth and gums normal.   Neck: Supple, symmetrical, trachea midline, no adenopathy, thyroid: no enlargement/tenderness/nodules, no carotid bruit and no JVD. Back:   Symmetric, no curvature. ROM normal. No CVA tenderness. Lungs:   Clear to auscultation bilaterally.    Chest wall:  No tenderness or deformity. Heart:  Regular rate and rhythm, S1, S2 normal, no murmur, click, rub or gallop. Breast Exam:  No tenderness, masses, or nipple abnormality. Abdomen:   Soft, non-tender. Bowel sounds normal. No masses,  No organomegaly. Rectal:  Normal tone,  no masses or tenderness  Guaiac negative stool. Extremities: Extremities normal, atraumatic, no cyanosis or edema. Foot exam done . Normal sensation to PP, LT and vibration. Sensation intact to microfilament testing. Pulses intact. No swelling. No skin lesions or sores noted. No tinea present. Pulses: 2+ and symmetric all extremities. Skin: Skin color, texture, turgor normal. No rashes or lesions. Lymph nodes: Cervical, supraclavicular, and axillary nodes normal.   Neurologic: CNII-XII intact. Normal strength, sensation and reflexes throughout. Patient Care Team:  Da Carson MD as PCP - [facility])    Advice/Referrals/Counseling   Education and counseling provided:  Are appropriate based on today's review and evaluation  End-of-Life planning (with patient's consent)      Assessment/Plan   Heather Reyna was seen today for annual wellness visit. ASSESSMENT and PLAN  Diagnoses and all orders for this visit:    1. Routine general medical examination at a health care facility  -     AMB POC URINALYSIS DIP STICK AUTO W/ MICRO     2. Essential hypertension  Stable at goal    3. Type 2 diabetes mellitus without complication, without long-term current use of insulin (HCC)  -     AMB POC HEMOGLOBIN A1C  -     AMB POC GLUCOSE, QUANTITATIVE, BLOOD  -     MICROALBUMIN, UR, RAND W/ MICROALB/CREAT RATIO    4. Mixed hyperlipidemia  -     LIPID PANEL    5. Encounter for medication monitoring  -     METABOLIC PANEL, COMPREHENSIVE  -     CBC W/O DIFF    6. Encounter for screening fecal occult blood testing  -     AMB POC FECAL BLOOD, OCCULT, QL 1 CARD    7.  Long-term use of aspirin therapy  -     AMB POC FECAL BLOOD, OCCULT, QL 1 CARD    8. Need for shingles vaccine  -     varicella-zoster recombinant, PF, (SHINGRIX) 50 mcg/0.5 mL susr injection; 0.5 mL by IntraMUSCular route once for 1 dose. Repeat second dose in 6 months. Follow-up Disposition: follow up 6 months  reviewed diet, exercise and weight control  cardiovascular risk and specific lipid/LDL goals reviewed  reviewed medications and side effects in detail  specific diabetic recommendations: low cholesterol diet, weight control and daily exercise discussed, home glucose monitoring emphasized, all medications, side effects and compliance discussed carefully, foot care discussed and Podiatry visits discussed, annual eye examinations at Ophthalmology discussed and glycohemoglobin and other lab monitoring discussed     I have discussed diagnosis listed in this note with pt and/or family. I have discussed treatment plans and options and the risk/benefit analysis of those options, including safe use of medications and possible medication side effects. Through the use of shared decision making we have agreed to the above plan. The patient has received an after-visit summary and questions were answered concerning future plans and follow up. Advise pt of any urgent changes then to proceed to the ER.

## 2018-05-16 NOTE — PROGRESS NOTES
Chief Complaint   Patient presents with    Annual Wellness Visit     Mammogram 12/04/2017    Colonoscopy 03/04/2016 repeat in 10 years    Patient stated last eye exam 01/02/2018    Verbal order for fecal occult and UA with micro per Dr. Gertrudis Wilcox    1. Have you been to the ER, urgent care clinic since your last visit? Hospitalized since your last visit? No    2. Have you seen or consulted any other health care providers outside of the [address] [place] [address] since your last visit? Include any pap smears or colon screening.  No     Patient denies pain at his time

## 2018-05-17 LAB
ALBUMIN SERPL-MCNC: 4.6 G/DL (ref 3.5–4.8)
ALBUMIN/GLOB SERPL: 1.8 {RATIO} (ref 1.2–2.2)
ALP SERPL-CCNC: 69 IU/L (ref 39–117)
ALT SERPL-CCNC: 10 IU/L (ref 0–32)
AST SERPL-CCNC: 13 IU/L (ref 0–40)
BILIRUB SERPL-MCNC: 0.6 MG/DL (ref 0–1.2)
BUN SERPL-MCNC: 11 MG/DL (ref 8–27)
BUN/CREAT SERPL: 22 (ref 12–28)
CALCIUM SERPL-MCNC: 9.5 MG/DL (ref 8.7–10.3)
CHLORIDE SERPL-SCNC: 101 MMOL/L (ref 96–106)
CHOLEST SERPL-MCNC: 207 MG/DL (ref 100–199)
CO2 SERPL-SCNC: 25 MMOL/L (ref 18–29)
CREAT SERPL-MCNC: 0.5 MG/DL (ref 0.57–1)
ERYTHROCYTE [DISTWIDTH] IN BLOOD BY AUTOMATED COUNT: 13.4 % (ref 12.3–15.4)
GFR SERPLBLD CREATININE-BSD FMLA CKD-EPI: 112 ML/MIN/1.73
GFR SERPLBLD CREATININE-BSD FMLA CKD-EPI: 97 ML/MIN/1.73
GLOBULIN SER CALC-MCNC: 2.5 G/DL (ref 1.5–4.5)
GLUCOSE SERPL-MCNC: 137 MG/DL (ref 65–99)
HCT VFR BLD AUTO: 39.5 % (ref 34–46.6)
HDLC SERPL-MCNC: 66 MG/DL
HGB BLD-MCNC: 13.1 G/DL (ref 11.1–15.9)
INTERPRETATION, 910389: NORMAL
LDLC SERPL CALC-MCNC: 127 MG/DL (ref 0–99)
MCH RBC QN AUTO: 30.5 PG (ref 26.6–33)
MCHC RBC AUTO-ENTMCNC: 33.2 G/DL (ref 31.5–35.7)
MCV RBC AUTO: 92 FL (ref 79–97)
PLATELET # BLD AUTO: 251 X10E3/UL (ref 150–379)
POTASSIUM SERPL-SCNC: 3.9 MMOL/L (ref 3.5–5.2)
PROT SERPL-MCNC: 7.1 G/DL (ref 6–8.5)
RBC # BLD AUTO: 4.3 X10E6/UL (ref 3.77–5.28)
SODIUM SERPL-SCNC: 143 MMOL/L (ref 134–144)
TRIGL SERPL-MCNC: 71 MG/DL (ref 0–149)
VLDLC SERPL CALC-MCNC: 14 MG/DL (ref 5–40)
WBC # BLD AUTO: 3.2 X10E3/UL (ref 3.4–10.8)

## 2018-05-17 RX ORDER — ATORVASTATIN CALCIUM 20 MG/1
20 TABLET, FILM COATED ORAL
Qty: 30 TAB | Refills: 3 | Status: SHIPPED | OUTPATIENT
Start: 2018-05-17 | End: 2018-12-31 | Stop reason: SINTOL

## 2018-05-18 LAB — BACTERIA UR CULT: NORMAL

## 2018-08-23 DIAGNOSIS — E11.9 TYPE 2 DIABETES MELLITUS WITHOUT COMPLICATION, WITHOUT LONG-TERM CURRENT USE OF INSULIN (HCC): ICD-10-CM

## 2018-08-24 RX ORDER — METFORMIN HYDROCHLORIDE 500 MG/1
TABLET ORAL
Qty: 270 TAB | Refills: 3 | Status: SHIPPED | OUTPATIENT
Start: 2018-08-24 | End: 2018-12-31 | Stop reason: DRUGHIGH

## 2018-10-08 RX ORDER — AZITHROMYCIN 250 MG/1
TABLET, FILM COATED ORAL
Qty: 6 TAB | Refills: 0 | Status: SHIPPED | OUTPATIENT
Start: 2018-10-08 | End: 2018-10-13

## 2018-10-08 NOTE — TELEPHONE ENCOUNTER
Patient wants to get something called in for a sinus infection.   If any questions please give her a call @ [phone]

## 2018-10-21 DIAGNOSIS — I10 ESSENTIAL HYPERTENSION: ICD-10-CM

## 2018-10-22 RX ORDER — LOSARTAN POTASSIUM AND HYDROCHLOROTHIAZIDE 12.5; 1 MG/1; MG/1
TABLET ORAL
Qty: 90 TAB | Refills: 3 | Status: SHIPPED | OUTPATIENT
Start: 2018-10-22 | End: 2019-06-18 | Stop reason: SDUPTHER

## 2018-11-16 ENCOUNTER — TELEPHONE (OUTPATIENT)
Dept: FAMILY MEDICINE CLINIC | Age: 73
End: 2018-11-16

## 2018-11-16 NOTE — TELEPHONE ENCOUNTER
----- Message from Hilary Krause sent at 11/16/2018 12:12 PM EST -----  Regarding: /telephone  Pt would like to be scheduled for the Flu shot and would like a call back from the nurse.  Callback: [phone]

## 2018-11-19 ENCOUNTER — CLINICAL SUPPORT (OUTPATIENT)
Dept: FAMILY MEDICINE CLINIC | Age: 73
End: 2018-11-19

## 2018-11-19 DIAGNOSIS — Z23 ENCOUNTER FOR IMMUNIZATION: Primary | ICD-10-CM

## 2018-11-19 NOTE — PROGRESS NOTES
Verbal order received per Dr. Arce [facility]- flu vaccine (FLUAD) IM- Rudy Antunez Pt received flu vaccine IM in right deltoid without any difficulty.

## 2018-12-10 ENCOUNTER — HOSPITAL ENCOUNTER (EMERGENCY)
Age: 73
Discharge: HOME | End: 2018-12-10
Attending: EMERGENCY MEDICINE
Payer: MEDICARE

## 2018-12-10 VITALS
HEIGHT: 63 IN | WEIGHT: 180 LBS | BODY MASS INDEX: 31.89 KG/M2 | TEMPERATURE: 99.2 F | RESPIRATION RATE: 16 BRPM | OXYGEN SATURATION: 99 % | HEART RATE: 74 BPM | SYSTOLIC BLOOD PRESSURE: 150 MMHG | DIASTOLIC BLOOD PRESSURE: 75 MMHG

## 2018-12-10 DIAGNOSIS — J01.00 ACUTE NON-RECURRENT MAXILLARY SINUSITIS: Primary | ICD-10-CM

## 2018-12-10 PROCEDURE — 99282 EMERGENCY DEPT VISIT SF MDM: CPT

## 2018-12-10 RX ORDER — GUAIFENESIN 100 MG/5ML
200 SOLUTION ORAL
Qty: 120 ML | Refills: 0 | Status: SHIPPED | OUTPATIENT
Start: 2018-12-10 | End: 2019-02-04 | Stop reason: ALTCHOICE

## 2018-12-10 RX ORDER — AMOXICILLIN 875 MG/1
875 TABLET, FILM COATED ORAL 2 TIMES DAILY
Qty: 20 TAB | Refills: 0 | Status: SHIPPED | OUTPATIENT
Start: 2018-12-10 | End: 2018-12-20

## 2018-12-10 NOTE — ED PROVIDER NOTES
EMERGENCY DEPARTMENT HISTORY AND PHYSICAL EXAM 
 
Date: 12/10/2018 Patient Name: Sergey Carey History of Presenting Illness Chief Complaint Patient presents with  Cold Symptoms History Provided By: Patient HPI: Sergey Carey is a 68 y.o. female with a PMH of diabetes, hypertension and chronic pain who presents with cc of sinus pressure, congestion which is now causing a cough, nasal congestion for 3 days. Pt states she has tried otc nasal spray, sinus decogestant anc cold medicine but it is not helping. She specifically denies any abd pain, wheezing, fevers, chills, nausea, vomiting, chest pain, shortness of breath, headache, rash, diarrhea, sweating or weight loss. All other ROS negative at this time Pt is in no acute distress and is speaking in full sentences PCP: Johanna Khan MD 
 
Current Outpatient Medications Medication Sig Dispense Refill  
 HYDROCHLOROTHIAZIDE PO Take  by mouth.  amoxicillin (AMOXIL) 875 mg tablet Take 1 Tab by mouth two (2) times a day for 10 days. 20 Tab 0  
 guaiFENesin (MUCINEX) 1,200 mg Ta12 ER tablet Take 1 Tab by mouth daily for 7 doses. 7 Tab 0  
 guaiFENesin (ROBITUSSIN) 100 mg/5 mL liquid Take 10 mL by mouth three (3) times daily as needed for Cough. 120 mL 0  
 metFORMIN (GLUCOPHAGE) 500 mg tablet TAKE 2 TABLETS EVERY MORNING AND TAKE 1 TABLET WITH DINNER 270 Tab 3  
 atorvastatin (LIPITOR) 20 mg tablet Take 1 Tab by mouth nightly. 30 Tab 3  
 atorvastatin (LIPITOR) 10 mg tablet TAKE 1 TABLET BY MOUTH EVERY DAY 90 Tab 3  
 aspirin delayed-release 81 mg tablet Take 1 Tab by mouth daily. 30 Tab 6  
 amLODIPine (NORVASC) 10 mg tablet Take 5 mg by mouth daily.     
 TRUE METRIX GLUCOSE TEST STRIP strip TEST BLOOD SUGAR TWICE DAILY  AND AS NEEDED 250 Strip 3  
 albuterol (PROVENTIL HFA, VENTOLIN HFA, PROAIR HFA) 90 mcg/actuation inhaler Take 2 Puffs by inhalation every four (4) hours as needed for Wheezing. 1 Inhaler 0  
 Lancets (PRODIGY TWIST TOP LANCET) misc Use to check blood glucose level twice a day and PRN. 200 Each 3  
 alcohol swabs (BD SINGLE USE SWABS REGULAR) padm Use to cleanse finger prior to checking glucose level. 200 Each 3  
 Cholecalciferol, Vitamin D3, (VITAMIN D3) 5,000 unit Tab Take 1 Cap by mouth daily.  losartan-hydroCHLOROthiazide (HYZAAR) 100-12.5 mg per tablet TAKE 1 TABLET EVERY DAY 90 Tab 3  
 fluticasone (FLONASE) 50 mcg/actuation nasal spray SHAKE LIQUID AND USE 2 SPRAYS IN EACH NOSTRIL DAILY AS NEEDED FOR RHINITIS 48 g 3  
 meloxicam (MOBIC) 15 mg tablet Take 15 mg by mouth as needed for Pain.  loratadine (CLARITIN) 10 mg tablet Take 1 tablet by mouth daily as needed for Allergies. 30 tablet 11  
 Omega-3-DHA-EPA-Fish Oil 1,000 (120-180) mg Cap Take 2 Caps by mouth every seven (7) days. Past History Past Medical History: 
Past Medical History:  
Diagnosis Date  Arthritis   
 knee  Cancer ([facility] [address] 2003  
 melanoma -right lower extremity  Chronic pain   
 left knee  Diabetes ([facility] [address] type 2  
 Hypertension  Nausea & vomiting  Screen for colon cancer 10/2/2014 Past Surgical History: 
Past Surgical History:  
Procedure Laterality Date  HX BREAST BIOPSY Right   
 yrs ago  HX CATARACT REMOVAL  2010  
 bilateral eyes  HX CHOLECYSTECTOMY  HX HYSTERECTOMY  1990  
 fibroid  HX KNEE ARTHROSCOPY  1999  
 left knee replacement  HX ORTHOPAEDIC    
 age 40- right foot  HX ORTHOPAEDIC  9/11/2015  
 left wrist surgery  HX OTHER SURGICAL  2005  
 permanent teeth replacements  HX OTHER SURGICAL Right 2003  
 melonoma removed rt leg  HX WISDOM TEETH EXTRACTION    
 [place] COLONOSCOPY FLX DX W/COLLJ SPEC WHEN PFRMD  2007  
 date and md unknown Family History: 
Family History Problem Relation Age of Onset  No Known Problems Mother  No Known Problems Father  Thyroid Disease Sister Social History: 
Social History Tobacco Use  Smoking status: Never Smoker  Smokeless tobacco: Never Used Substance Use Topics  Alcohol use: No  
  Alcohol/week: 0.0 oz  Drug use: No  
 
 
Allergies: 
No Known Allergies Review of Systems Review of Systems Constitutional: Negative. Negative for chills and fever. HENT: Positive for congestion, rhinorrhea, sinus pressure and sinus pain. Negative for ear pain, sneezing, sore throat and tinnitus. Eyes: Negative. Respiratory: Positive for cough. Negative for choking, chest tightness, shortness of breath, wheezing and stridor. Cardiovascular: Negative. Negative for chest pain. Gastrointestinal: Negative. Negative for abdominal pain, diarrhea, nausea and vomiting. Endocrine: Negative. Genitourinary: Negative. Musculoskeletal: Negative. Skin: Negative. Allergic/Immunologic: Negative. Neurological: Negative. Negative for headaches. Hematological: Negative. Psychiatric/Behavioral: Negative. All other systems reviewed and are negative. Physical Exam  
 
Vitals:  
 12/10/18 1308 BP: 150/75 Pulse: 74 Resp: 16 Temp: 99.2 °F (37.3 °C) SpO2: 99% Weight: 81.6 kg (180 lb) Height: 5' 3\" (1.6 m) Physical Exam  
Constitutional: She is oriented to person, place, and time. She appears well-developed and well-nourished. No distress. HENT:  
Head: Normocephalic and atraumatic. Right Ear: External ear normal.  
Left Ear: External ear normal.  
Nose: Rhinorrhea present. No mucosal edema. Right sinus exhibits maxillary sinus tenderness. Right sinus exhibits no frontal sinus tenderness. Left sinus exhibits maxillary sinus tenderness. Left sinus exhibits no frontal sinus tenderness. Mouth/Throat: Uvula is midline, oropharynx is clear and moist and mucous membranes are normal. No trismus in the jaw. No uvula swelling.  No oropharyngeal exudate, posterior oropharyngeal edema, posterior oropharyngeal erythema or tonsillar abscesses. Eyes: Conjunctivae and EOM are normal. Pupils are equal, round, and reactive to light. Neck: Normal range of motion. Neck supple. No tracheal deviation present. Cardiovascular: Normal rate, regular rhythm, normal heart sounds and intact distal pulses. Pulmonary/Chest: Effort normal and breath sounds normal. No respiratory distress. She has no wheezes (CTA). She has no rales. She exhibits no tenderness. Abdominal: Soft. Bowel sounds are normal. She exhibits no distension. There is no tenderness. There is no rebound, no CVA tenderness, no tenderness at McBurney's point and negative Guerra's sign. Musculoskeletal: Normal range of motion. She exhibits no edema, tenderness or deformity. Lymphadenopathy:  
  She has no cervical adenopathy. Neurological: She is alert and oriented to person, place, and time. She has normal reflexes. She displays normal reflexes. No cranial nerve deficit. She exhibits normal muscle tone. Coordination normal.  
Skin: Skin is warm and dry. She is not diaphoretic. No pallor. Psychiatric: She has a normal mood and affect. Her behavior is normal. Judgment and thought content normal.  
Nursing note and vitals reviewed. Diagnostic Study Results Labs - No results found for this or any previous visit (from the past 12 hour(s)). Radiologic Studies - No orders to display CT Results  (Last 48 hours) None CXR Results  (Last 48 hours) None Medical Decision Making I am the first provider for this patient. I reviewed the vital signs, available nursing notes, past medical history, past surgical history, family history and social history. Vital Signs-Reviewed the patient's vital signs. Records Reviewed: Nursing Notes, Old Medical Records, Previous Radiology Studies and Previous Laboratory Studies Disposition: 
Discharge DISCHARGE NOTE:  
 Care plan outlined and precautions discussed. Patient has no new complaints, changes, or physical findings. Results of visit were reviewed with the patient. All medications were reviewed with the patient; will d/c home. All of pt's questions and concerns were addressed. Patient was instructed and agrees to follow up with pcp, as well as to return to the ED upon further deterioration. Patient is ready to go home. Follow-up Information None Current Discharge Medication List  
  
START taking these medications Details  
amoxicillin (AMOXIL) 875 mg tablet Take 1 Tab by mouth two (2) times a day for 10 days. Qty: 20 Tab, Refills: 0  
  
guaiFENesin (MUCINEX) 1,200 mg Ta12 ER tablet Take 1 Tab by mouth daily for 7 doses. Qty: 7 Tab, Refills: 0  
  
guaiFENesin (ROBITUSSIN) 100 mg/5 mL liquid Take 10 mL by mouth three (3) times daily as needed for Cough. Qty: 120 mL, Refills: 0 Provider Notes (Medical Decision Making): DDX: sinusitis bacterial vs viral, uri, pharyngitis Lungs CTA- no fevers 
maxillary sinus pressure -willl treat with abx Worsening si/sxs discussed extensively Follow up with PCP or RTC if symptoms/signs worsen Side effects of medication discussed Education materials provided at discharge Pt verbalizes agreement with plan 
 
Procedures: 
Procedures Diagnosis Clinical Impression: 1. Acute non-recurrent maxillary sinusitis

## 2018-12-10 NOTE — ED NOTES
Patient presents to ED with c/o cough since Friday. Denies shortness of breath. Emergency Department Nursing Plan of Care The Nursing Plan of Care is developed from the Nursing assessment and Emergency Department Attending provider initial evaluation. The plan of care may be reviewed in the ED Provider note. The Plan of Care was developed with the following considerations:  
Patient / Family readiness to learn indicated by:verbalized understanding Persons(s) to be included in education: patient Barriers to Learning/Limitations:No 
 
Signed Neha Sol RN   
12/10/2018   1:15 PM

## 2018-12-10 NOTE — ED NOTES
Patient educated on discharge instructions and 3 prescriptions . Patient verbalized understanding of education. Patient given discharge instructions and 3 prescriptions. Patient ambulated out of ED. No acute distress noted.

## 2018-12-18 RX ORDER — ALBUTEROL SULFATE 90 UG/1
2 AEROSOL, METERED RESPIRATORY (INHALATION)
Qty: 1 INHALER | Refills: 11 | Status: SHIPPED | OUTPATIENT
Start: 2018-12-18

## 2018-12-18 NOTE — TELEPHONE ENCOUNTER
Patient is requesting a RX refill for an inhaler she has allergies she could not tell me the name of it she can be reached @ [phone]

## 2018-12-31 ENCOUNTER — OFFICE VISIT (OUTPATIENT)
Dept: FAMILY MEDICINE CLINIC | Age: 73
End: 2018-12-31

## 2018-12-31 VITALS
BODY MASS INDEX: 30.9 KG/M2 | DIASTOLIC BLOOD PRESSURE: 88 MMHG | RESPIRATION RATE: 16 BRPM | HEART RATE: 73 BPM | OXYGEN SATURATION: 99 % | WEIGHT: 174.4 LBS | HEIGHT: 63 IN | TEMPERATURE: 97.8 F | SYSTOLIC BLOOD PRESSURE: 160 MMHG

## 2018-12-31 DIAGNOSIS — I10 ESSENTIAL HYPERTENSION: Primary | ICD-10-CM

## 2018-12-31 DIAGNOSIS — E11.9 TYPE 2 DIABETES MELLITUS WITHOUT COMPLICATION, WITHOUT LONG-TERM CURRENT USE OF INSULIN (HCC): ICD-10-CM

## 2018-12-31 DIAGNOSIS — Z51.81 ENCOUNTER FOR MEDICATION MONITORING: ICD-10-CM

## 2018-12-31 DIAGNOSIS — E78.2 MIXED HYPERLIPIDEMIA: ICD-10-CM

## 2018-12-31 DIAGNOSIS — Z12.31 ENCOUNTER FOR SCREENING MAMMOGRAM FOR BREAST CANCER: ICD-10-CM

## 2018-12-31 LAB
BILIRUB UR QL STRIP: NEGATIVE
GLUCOSE POC: 124 MG/DL
GLUCOSE UR-MCNC: NEGATIVE MG/DL
HBA1C MFR BLD HPLC: 6.8 %
KETONES P FAST UR STRIP-MCNC: NEGATIVE MG/DL
PH UR STRIP: 7 [PH] (ref 4.6–8)
PROT UR QL STRIP: NEGATIVE
SP GR UR STRIP: 1.01 (ref 1–1.03)
UA UROBILINOGEN AMB POC: NORMAL (ref 0.2–1)
URINALYSIS CLARITY POC: CLEAR
URINALYSIS COLOR POC: YELLOW
URINE BLOOD POC: NEGATIVE
URINE LEUKOCYTES POC: NORMAL
URINE NITRITES POC: NEGATIVE

## 2018-12-31 RX ORDER — METFORMIN HYDROCHLORIDE 500 MG/1
500 TABLET ORAL 2 TIMES DAILY WITH MEALS
COMMUNITY
End: 2020-08-17 | Stop reason: SDUPTHER

## 2018-12-31 NOTE — PROGRESS NOTES
HISTORY OF PRESENT ILLNESS Martha Burch is a 68 y.o. female. HPI Follow up on BP, cholesterol and BS. Overall feeling well. HTN follow up: 
Compliant w/ meds, low salt diet, and exercise. No home bp monitoring. No swelling, headache or dizziness. No chest pain, SOB, palpitations. Otherwise feeling well since the last visit. DM type II follow up: 
Compliant w/ meds, diabetic diet, and exercise. Last a1c level was 6.8% in 5/2018. Obtains home glucose monitoring averaging 100-140s. BS was a little higher during the holidays. Checks BS BID on most days and prn. Pt does not have BS log at visit today. No Rf needed for today. Denies any tingling sensation, polyuria and polydipsia. No blurred vision. No significant weight changes. Feeling well since last OV. Hypercholesterolemia follow up: 
Compliant w/ low fat, low cholesterol diet. Exercising some. Currently not on medication. Patient Active Problem List  
Diagnosis Code  Right knee DJD M17.11  
 Arthritis M19.90  
 Cancer ([facility] [address] C80.1  Chronic pain G89.29  
 Hyperlipidemia E78.5  Hypovitaminosis D E55.9  Screen for colon cancer Z12.11  Type 2 diabetes mellitus without complication (HCC) X80.1  
 Essential hypertension I10  
 Encounter for medication monitoring Z51.81 Current Outpatient Medications Medication Sig Dispense Refill  albuterol (PROVENTIL HFA, VENTOLIN HFA, PROAIR HFA) 90 mcg/actuation inhaler Take 2 Puffs by inhalation every four (4) hours as needed for Wheezing. 1 Inhaler 11  
 HYDROCHLOROTHIAZIDE PO Take  by mouth.  guaiFENesin (ROBITUSSIN) 100 mg/5 mL liquid Take 10 mL by mouth three (3) times daily as needed for Cough.  120 mL 0  
 losartan-hydroCHLOROthiazide (HYZAAR) 100-12.5 mg per tablet TAKE 1 TABLET EVERY DAY 90 Tab 3  
 metFORMIN (GLUCOPHAGE) 500 mg tablet TAKE 2 TABLETS EVERY MORNING AND TAKE 1 TABLET WITH DINNER 270 Tab 3  
  atorvastatin (LIPITOR) 20 mg tablet Take 1 Tab by mouth nightly. 30 Tab 3  
 fluticasone (FLONASE) 50 mcg/actuation nasal spray SHAKE LIQUID AND USE 2 SPRAYS IN EACH NOSTRIL DAILY AS NEEDED FOR RHINITIS 48 g 3  
 atorvastatin (LIPITOR) 10 mg tablet TAKE 1 TABLET BY MOUTH EVERY DAY 90 Tab 3  
 aspirin delayed-release 81 mg tablet Take 1 Tab by mouth daily. 30 Tab 6  
 amLODIPine (NORVASC) 10 mg tablet Take 5 mg by mouth daily.  TRUE METRIX GLUCOSE TEST STRIP strip TEST BLOOD SUGAR TWICE DAILY  AND AS NEEDED 250 Strip 3  
 meloxicam (MOBIC) 15 mg tablet Take 15 mg by mouth as needed for Pain.  loratadine (CLARITIN) 10 mg tablet Take 1 tablet by mouth daily as needed for Allergies. 30 tablet 11  Lancets (PRODIGY TWIST TOP LANCET) misc Use to check blood glucose level twice a day and PRN. 200 Each 3  
 alcohol swabs (BD SINGLE USE SWABS REGULAR) padm Use to cleanse finger prior to checking glucose level. 200 Each 3  
 Cholecalciferol, Vitamin D3, (VITAMIN D3) 5,000 unit Tab Take 1 Cap by mouth daily.  Omega-3-DHA-EPA-Fish Oil 1,000 (120-180) mg Cap Take 2 Caps by mouth every seven (7) days. No Known Allergies Past Medical History:  
Diagnosis Date  Arthritis   
 knee  Cancer ([facility] [address] 2003  
 melanoma -right lower extremity  Chronic pain   
 left knee  Diabetes ([facility] [address] type 2  
 Hypertension  Nausea & vomiting  Screen for colon cancer 10/2/2014 Past Surgical History:  
Procedure Laterality Date  HX BREAST BIOPSY Right   
 yrs ago  HX CATARACT REMOVAL  2010  
 bilateral eyes  HX CHOLECYSTECTOMY  HX HYSTERECTOMY  1990  
 fibroid  HX KNEE ARTHROSCOPY  1999  
 left knee replacement  HX ORTHOPAEDIC    
 age 40- right foot  HX ORTHOPAEDIC  9/11/2015  
 left wrist surgery  HX OTHER SURGICAL  2005  
 permanent teeth replacements  HX OTHER SURGICAL Right 2003  
 melonoma removed rt leg  HX WISDOM TEETH EXTRACTION    
  [place] COLONOSCOPY FLX DX W/COLLJ SPEC WHEN PFRMD  2007  
 date and md unknown Family History Problem Relation Age of Onset  No Known Problems Mother  No Known Problems Father  Thyroid Disease Sister Social History Tobacco Use  Smoking status: Never Smoker  Smokeless tobacco: Never Used Substance Use Topics  Alcohol use: No  
  Alcohol/week: 0.0 oz Lab Results Component Value Date/Time WBC 3.2 (L) 05/16/2018 10:21 AM  
 Hemoglobin (POC) 15.3 12/27/2009 07:16 AM  
 HGB 13.1 05/16/2018 10:21 AM  
 Hematocrit (POC) 45 12/27/2009 07:16 AM  
 HCT 39.5 05/16/2018 10:21 AM  
 PLATELET 384 67/18/3064 10:21 AM  
 MCV 92 05/16/2018 10:21 AM  
 
 
Lab Results Component Value Date/Time Cholesterol, total 207 (H) 05/16/2018 10:21 AM  
 HDL Cholesterol 66 05/16/2018 10:21 AM  
 LDL, calculated 127 (H) 05/16/2018 10:21 AM  
 Triglyceride 71 05/16/2018 10:21 AM  
 CHOL/HDL Ratio 4.3 12/27/2009 07:40 AM  
 
 
Lab Results Component Value Date/Time Sodium 143 05/16/2018 10:21 AM  
 Potassium 3.9 05/16/2018 10:21 AM  
 Chloride 101 05/16/2018 10:21 AM  
 CO2 25 05/16/2018 10:21 AM  
 Anion gap 9 10/22/2016 12:46 PM  
 Glucose 137 (H) 05/16/2018 10:21 AM  
 BUN 11 05/16/2018 10:21 AM  
 Creatinine 0.50 (L) 05/16/2018 10:21 AM  
 BUN/Creatinine ratio 22 05/16/2018 10:21 AM  
 GFR est  05/16/2018 10:21 AM  
 GFR est non-AA 97 05/16/2018 10:21 AM  
 Calcium 9.5 05/16/2018 10:21 AM  
 Bilirubin, total 0.6 05/16/2018 10:21 AM  
 ALT (SGPT) 10 05/16/2018 10:21 AM  
 AST (SGOT) 13 05/16/2018 10:21 AM  
 Alk. phosphatase 69 05/16/2018 10:21 AM  
 Protein, total 7.1 05/16/2018 10:21 AM  
 Albumin 4.6 05/16/2018 10:21 AM  
 Globulin 4.2 (H) 10/22/2016 12:46 PM  
 A-G Ratio 1.8 05/16/2018 10:21 AM  
  
Lab Results Component Value Date/Time Hemoglobin A1c 6.8 (H) 02/24/2014 09:00 AM  
 Hemoglobin A1c (POC) 6.9 05/16/2018 09:05 AM  
   
Review of Systems Constitutional: Negative for malaise/fatigue. HENT: Negative for congestion. Eyes: Negative for blurred vision. Respiratory: Negative for cough and shortness of breath. Cardiovascular: Negative for chest pain, palpitations and leg swelling. Gastrointestinal: Negative for abdominal pain, constipation and heartburn. Genitourinary: Negative for dysuria, frequency and urgency. Musculoskeletal: Negative for back pain and joint pain. Neurological: Negative for dizziness, tingling and headaches. Endo/Heme/Allergies: Positive for environmental allergies. Psychiatric/Behavioral: Negative for depression. The patient does not have insomnia. Physical Exam  
Constitutional: She appears well-developed and well-nourished. /88   Pulse 73   Temp 97.8 °F (36.6 °C) (Oral)   Resp 16   Ht 5' 3\" (1.6 m)   Wt 174 lb 6.4 oz (79.1 kg)   LMP 05/10/1990   SpO2 99%   BMI 30.89 kg/m² HENT:  
Right Ear: Tympanic membrane and ear canal normal.  
Left Ear: Tympanic membrane and ear canal normal.  
Nose: No mucosal edema or rhinorrhea. Mouth/Throat: Oropharynx is clear and moist and mucous membranes are normal.  
Neck: Normal range of motion. Neck supple. No thyromegaly present. Cardiovascular: Normal rate and regular rhythm. No murmur heard. Pulmonary/Chest: Effort normal and breath sounds normal.  
Abdominal: Soft. Bowel sounds are normal. There is no tenderness. Musculoskeletal: Normal range of motion. She exhibits no edema. Lymphadenopathy:  
  She has no cervical adenopathy. Skin: Skin is warm and dry. Psychiatric: She has a normal mood and affect. Nursing note and vitals reviewed. ASSESSMENT and PLAN Diagnoses and all orders for this visit: 1. Essential hypertension Discussed sodium restriction, high k rich diet, maintaining ideal body weight and regular exercise program such as daily walking 30 min perday 4-5 times per week, as physiologic means to achieve blood pressure control.  Medication compliance advised. 2. Type 2 diabetes mellitus without complication, without long-term current use of insulin ([address] A1c level 6.8%.   
-     MICROALBUMIN, UR, RAND W/ MICROALB/CREAT RATIO 
-     AMB POC HEMOGLOBIN A1C 
-     AMB POC GLUCOSE, QUANTITATIVE, BLOOD 
-     AMB POC URINALYSIS DIP STICK AUTO W/ MICRO 3. Mixed hyperlipidemia -     LIPID PANEL 4. Encounter for medication monitoring -     METABOLIC PANEL, COMPREHENSIVE 
-     CBC W/O DIFF 5. Encounter for screening mammogram for breast cancer -     [facility] MAMMO BI SCREENING INCL CAD; Future 6. Non Morbid Obesity I have reviewed/discussed the above normal BMI with the patient. I have recommended the following interventions: dietary management education, guidance, and counseling Follow-up Disposition: 
Return in about 1 month (around 1/31/2019) for follow up on BP. . 
reviewed diet, exercise and weight control 
cardiovascular risk and specific lipid/LDL goals reviewed 
reviewed medications and side effects in detail I have discussed diagnosis listed in this note with pt and/or family. I have discussed treatment plans and options and the risk/benefit analysis of those options, including safe use of medications and possible medication side effects. Through the use of shared decision making we have agreed to the above plan. The patient has received an after-visit summary and questions were answered concerning future plans and follow up. Advise pt of any urgent changes then to proceed to the ER.

## 2018-12-31 NOTE — PROGRESS NOTES
Chief Complaint Patient presents with  Hypertension  
  follow up  Diabetes  
  follow up  Cholesterol Problem  
  follow up Mammogram 12/4/2017 Bone density 12/4/2017 Colonoscopy 3/4/2016 by Dr. Robbie Rizzo- repeat in 10 years 1. Have you been to the ER, urgent care clinic since your last visit? Hospitalized since your last visit? 2. Have you seen or consulted any other health care providers outside of the [facility] since your last visit? Include any pap smears or colon screening.

## 2019-01-01 LAB
ALBUMIN SERPL-MCNC: 5 G/DL (ref 3.5–4.8)
ALBUMIN/CREAT UR: 13.7 MG/G CREAT (ref 0–30)
ALBUMIN/GLOB SERPL: 1.8 {RATIO} (ref 1.2–2.2)
ALP SERPL-CCNC: 71 IU/L (ref 39–117)
ALT SERPL-CCNC: 12 IU/L (ref 0–32)
AST SERPL-CCNC: 18 IU/L (ref 0–40)
BILIRUB SERPL-MCNC: 0.5 MG/DL (ref 0–1.2)
BUN SERPL-MCNC: 11 MG/DL (ref 8–27)
BUN/CREAT SERPL: 19 (ref 12–28)
CALCIUM SERPL-MCNC: 10.4 MG/DL (ref 8.7–10.3)
CHLORIDE SERPL-SCNC: 101 MMOL/L (ref 96–106)
CHOLEST SERPL-MCNC: 209 MG/DL (ref 100–199)
CO2 SERPL-SCNC: 25 MMOL/L (ref 20–29)
CREAT SERPL-MCNC: 0.57 MG/DL (ref 0.57–1)
CREAT UR-MCNC: 29.1 MG/DL
ERYTHROCYTE [DISTWIDTH] IN BLOOD BY AUTOMATED COUNT: 13.1 % (ref 12.3–15.4)
GLOBULIN SER CALC-MCNC: 2.8 G/DL (ref 1.5–4.5)
GLUCOSE SERPL-MCNC: 139 MG/DL (ref 65–99)
HCT VFR BLD AUTO: 43 % (ref 34–46.6)
HDLC SERPL-MCNC: 69 MG/DL
HGB BLD-MCNC: 14 G/DL (ref 11.1–15.9)
INTERPRETATION, 910389: NORMAL
LDLC SERPL CALC-MCNC: 122 MG/DL (ref 0–99)
Lab: NORMAL
MCH RBC QN AUTO: 30.5 PG (ref 26.6–33)
MCHC RBC AUTO-ENTMCNC: 32.6 G/DL (ref 31.5–35.7)
MCV RBC AUTO: 94 FL (ref 79–97)
MICROALBUMIN UR-MCNC: 4 UG/ML
PLATELET # BLD AUTO: 273 X10E3/UL (ref 150–379)
POTASSIUM SERPL-SCNC: 3.6 MMOL/L (ref 3.5–5.2)
PROT SERPL-MCNC: 7.8 G/DL (ref 6–8.5)
RBC # BLD AUTO: 4.59 X10E6/UL (ref 3.77–5.28)
SODIUM SERPL-SCNC: 142 MMOL/L (ref 134–144)
TRIGL SERPL-MCNC: 91 MG/DL (ref 0–149)
VLDLC SERPL CALC-MCNC: 18 MG/DL (ref 5–40)
WBC # BLD AUTO: 3.5 X10E3/UL (ref 3.4–10.8)

## 2019-01-24 ENCOUNTER — HOSPITAL ENCOUNTER (OUTPATIENT)
Dept: MAMMOGRAPHY | Age: 74
Discharge: HOME | End: 2019-01-24
Attending: FAMILY MEDICINE
Payer: MEDICARE

## 2019-01-24 DIAGNOSIS — Z12.31 ENCOUNTER FOR SCREENING MAMMOGRAM FOR BREAST CANCER: ICD-10-CM

## 2019-01-24 PROCEDURE — 77067 SCR MAMMO BI INCL CAD: CPT

## 2019-01-24 RX ORDER — BLOOD-GLUCOSE METER
EACH MISCELLANEOUS
Qty: 1 EACH | Refills: 0 | Status: SHIPPED | OUTPATIENT
Start: 2019-01-24

## 2019-02-04 ENCOUNTER — OFFICE VISIT (OUTPATIENT)
Dept: FAMILY MEDICINE CLINIC | Age: 74
End: 2019-02-04

## 2019-02-04 VITALS
TEMPERATURE: 97.5 F | WEIGHT: 180.2 LBS | SYSTOLIC BLOOD PRESSURE: 137 MMHG | RESPIRATION RATE: 16 BRPM | HEIGHT: 63 IN | HEART RATE: 75 BPM | BODY MASS INDEX: 31.93 KG/M2 | OXYGEN SATURATION: 99 % | DIASTOLIC BLOOD PRESSURE: 80 MMHG

## 2019-02-04 DIAGNOSIS — Z51.81 ENCOUNTER FOR MEDICATION MONITORING: ICD-10-CM

## 2019-02-04 DIAGNOSIS — I10 ESSENTIAL HYPERTENSION: Primary | ICD-10-CM

## 2019-02-04 RX ORDER — HYDROCHLOROTHIAZIDE 12.5 MG/1
12.5 TABLET ORAL DAILY
COMMUNITY
End: 2019-05-06

## 2019-02-04 NOTE — PROGRESS NOTES
Chief Complaint Patient presents with  Hypertension 1 month follow up 1. Have you been to the ER, urgent care clinic since your last visit? Hospitalized since your last visit? 2. Have you seen or consulted any other health care providers outside of the [address] [place] [address] since your last visit? Include any pap smears or colon screening.

## 2019-02-04 NOTE — PROGRESS NOTES
HISTORY OF PRESENT ILLNESS Milton Westbrook is a 68 y.o. female. HPI  
1 month follow up on BP. HTN follow up: 
Compliant w/ meds, low salt diet, and exercise. No home bp monitoring. No swelling, headache or dizziness. No chest pain, SOB, palpitations. Otherwise feeling well since the last visit. Patient Active Problem List  
Diagnosis Code  Right knee DJD M17.11  
 Arthritis M19.90  
 Cancer ([facility] C80.1  Chronic pain G89.29  
 Hyperlipidemia E78.5  Hypovitaminosis D E55.9  Screen for colon cancer Z12.11  Type 2 diabetes mellitus without complication (HCC) U47.9  
 Essential hypertension I10  
 Encounter for medication monitoring Z51.81 Current Outpatient Medications Medication Sig Dispense Refill  hydroCHLOROthiazide (HYDRODIURIL) 12.5 mg tablet Take 12.5 mg by mouth daily.  Blood-Glucose Meter (TRUE METRIX AIR GLUCOSE METER) misc Use to check blood sugar twice a day, E11.9 1 Each 0  
 glucose blood VI test strips (TRUE METRIX GLUCOSE TEST STRIP) strip Use to test blood sugar twice daily, E11.9 100 Strip 3  Blood Glucose Control, Low (TRUE METRIX LEVEL 1) soln Use as directed. E11.9 1 Each 3  
 metFORMIN (GLUCOPHAGE) 500 mg tablet Take 1,000 mg by mouth daily (with breakfast).  albuterol (PROVENTIL HFA, VENTOLIN HFA, PROAIR HFA) 90 mcg/actuation inhaler Take 2 Puffs by inhalation every four (4) hours as needed for Wheezing. 1 Inhaler 11  
 losartan-hydroCHLOROthiazide (HYZAAR) 100-12.5 mg per tablet TAKE 1 TABLET EVERY DAY 90 Tab 3  
 fluticasone (FLONASE) 50 mcg/actuation nasal spray SHAKE LIQUID AND USE 2 SPRAYS IN EACH NOSTRIL DAILY AS NEEDED FOR RHINITIS 48 g 3  
 aspirin delayed-release 81 mg tablet Take 1 Tab by mouth daily. 30 Tab 6  
 amLODIPine (NORVASC) 10 mg tablet Take 5 mg by mouth daily.  meloxicam (MOBIC) 15 mg tablet Take 15 mg by mouth daily as needed for Pain.     
 loratadine (CLARITIN) 10 mg tablet Take 1 tablet by mouth daily as needed for Allergies. 30 tablet 11  Lancets (PRODIGY TWIST TOP LANCET) misc Use to check blood glucose level twice a day and PRN. 200 Each 3  
 alcohol swabs (BD SINGLE USE SWABS REGULAR) padm Use to cleanse finger prior to checking glucose level. 200 Each 3  
 Cholecalciferol, Vitamin D3, (VITAMIN D3) 5,000 unit Tab Take 1 Cap by mouth daily.  Omega-3-DHA-EPA-Fish Oil 1,000 (120-180) mg Cap Take 1 Cap by mouth two (2) times a week. No Known Allergies Past Medical History:  
Diagnosis Date  Arthritis   
 knee  Cancer ([facility] [address] 2003  
 melanoma -right lower extremity  Chronic pain   
 left knee  Diabetes ([facility] [address] type 2  
 Hypertension  Nausea & vomiting  Screen for colon cancer 10/2/2014 Past Surgical History:  
Procedure Laterality Date  HX BREAST BIOPSY Right   
 yrs ago  HX CATARACT REMOVAL  2010  
 bilateral eyes  HX CHOLECYSTECTOMY  HX HYSTERECTOMY  1990  
 fibroid  HX KNEE ARTHROSCOPY  1999  
 left knee replacement  HX ORTHOPAEDIC    
 age 40- right foot  HX ORTHOPAEDIC  9/11/2015  
 left wrist surgery  HX OTHER SURGICAL  2005  
 permanent teeth replacements  HX OTHER SURGICAL Right 2003  
 melonoma removed rt leg  HX WISDOM TEETH EXTRACTION    
 [place] COLONOSCOPY FLX DX W/COLLJ SPEC WHEN PFRMD  2007  
 date and md unknown Family History Problem Relation Age of Onset  No Known Problems Mother  No Known Problems Father  Thyroid Disease Sister Social History Tobacco Use  Smoking status: Never Smoker  Smokeless tobacco: Never Used Substance Use Topics  Alcohol use: No  
  Alcohol/week: 0.0 oz Lab Results Component Value Date/Time  WBC 3.5 12/31/2018 10:16 AM  
 HGB 14.0 12/31/2018 10:16 AM  
 Hemoglobin (POC) 15.3 12/27/2009 07:16 AM  
 HCT 43.0 12/31/2018 10:16 AM  
 Hematocrit (POC) 45 12/27/2009 07:16 AM  
 PLATELET 537 02/88/3323 10:16 AM  
 MCV 94 12/31/2018 10:16 AM  
 
 Lab Results Component Value Date/Time Cholesterol, total 209 (H) 12/31/2018 10:16 AM  
 HDL Cholesterol 69 12/31/2018 10:16 AM  
 LDL, calculated 122 (H) 12/31/2018 10:16 AM  
 Triglyceride 91 12/31/2018 10:16 AM  
 CHOL/HDL Ratio 4.3 12/27/2009 07:40 AM  
 
Lab Results Component Value Date/Time TSH 2.220 09/05/2017 09:57 AM  
  
Lab Results Component Value Date/Time Sodium 142 12/31/2018 10:16 AM  
 Potassium 3.6 12/31/2018 10:16 AM  
 Chloride 101 12/31/2018 10:16 AM  
 CO2 25 12/31/2018 10:16 AM  
 Anion gap 9 10/22/2016 12:46 PM  
 Glucose 139 (H) 12/31/2018 10:16 AM  
 BUN 11 12/31/2018 10:16 AM  
 Creatinine 0.57 12/31/2018 10:16 AM  
 BUN/Creatinine ratio 19 12/31/2018 10:16 AM  
 GFR est  12/31/2018 10:16 AM  
 GFR est non-AA 93 12/31/2018 10:16 AM  
 Calcium 10.4 (H) 12/31/2018 10:16 AM  
 Bilirubin, total 0.5 12/31/2018 10:16 AM  
 ALT (SGPT) 12 12/31/2018 10:16 AM  
 AST (SGOT) 18 12/31/2018 10:16 AM  
 Alk. phosphatase 71 12/31/2018 10:16 AM  
 Protein, total 7.8 12/31/2018 10:16 AM  
 Albumin 5.0 (H) 12/31/2018 10:16 AM  
 Globulin 4.2 (H) 10/22/2016 12:46 PM  
 A-G Ratio 1.8 12/31/2018 10:16 AM  
  
Lab Results Component Value Date/Time Hemoglobin A1c 6.8 (H) 02/24/2014 09:00 AM  
 Hemoglobin A1c (POC) 6.8 12/31/2018 10:16 AM  
   
Review of Systems Constitutional: Negative for malaise/fatigue. HENT: Negative for congestion. Eyes: Negative for blurred vision. Respiratory: Negative for cough and shortness of breath. Cardiovascular: Negative for chest pain, palpitations and leg swelling. Gastrointestinal: Negative for abdominal pain, constipation and heartburn. Genitourinary: Negative for dysuria, frequency and urgency. Musculoskeletal: Negative for back pain and joint pain. Neurological: Negative for dizziness, tingling and headaches. Endo/Heme/Allergies: Negative for environmental allergies. Psychiatric/Behavioral: Negative for depression. The patient does not have insomnia. Physical Exam  
Constitutional: She appears well-developed and well-nourished. /80   Pulse 75   Temp 97.5 °F (36.4 °C) (Oral)   Resp 16   Ht 5' 3\" (1.6 m)   Wt 180 lb 3.2 oz (81.7 kg)   LMP 05/10/1990   SpO2 99%   BMI 31.92 kg/m² HENT:  
Right Ear: Tympanic membrane and ear canal normal.  
Left Ear: Tympanic membrane and ear canal normal.  
Nose: No mucosal edema or rhinorrhea. Mouth/Throat: Oropharynx is clear and moist and mucous membranes are normal.  
Neck: Normal range of motion. Neck supple. No thyromegaly present. Cardiovascular: Normal rate, regular rhythm and normal heart sounds. Exam reveals no gallop. Pulmonary/Chest: Effort normal and breath sounds normal.  
Abdominal: Soft. Normal appearance and bowel sounds are normal. She exhibits no mass. There is no tenderness. Musculoskeletal: Normal range of motion. She exhibits no edema. Lymphadenopathy:  
  She has no cervical adenopathy. Skin: Skin is warm and dry. Psychiatric: She has a normal mood and affect. Nursing note and vitals reviewed. ASSESSMENT and PLAN Diagnoses and all orders for this visit: 1. Essential hypertension BP is improved form previous visit. Discussed sodium restriction, high k rich diet, maintaining ideal body weight and regular exercise program such as daily walking 30 min perday 4-5 times per week, as physiologic means to achieve blood pressure control.  Medication compliance advised. 2. Encounter for medication monitoring Follow-up Disposition: 
Return in about 3 months (around 5/1/2019). reviewed diet, exercise and weight control 
cardiovascular risk and specific lipid/LDL goals reviewed 
reviewed medications and side effects in detail I have discussed diagnosis listed in this note with pt and/or family.  I have discussed treatment plans and options and the risk/benefit analysis of those options, including safe use of medications and possible medication side effects. Through the use of shared decision making we have agreed to the above plan. The patient has received an after-visit summary and questions were answered concerning future plans and follow up. Advise pt of any urgent changes then to proceed to the ER.

## 2019-05-06 ENCOUNTER — OFFICE VISIT (OUTPATIENT)
Dept: FAMILY MEDICINE CLINIC | Age: 74
End: 2019-05-06

## 2019-05-06 VITALS
SYSTOLIC BLOOD PRESSURE: 170 MMHG | DIASTOLIC BLOOD PRESSURE: 88 MMHG | RESPIRATION RATE: 16 BRPM | BODY MASS INDEX: 31.4 KG/M2 | TEMPERATURE: 96.8 F | WEIGHT: 177.2 LBS | HEART RATE: 61 BPM | OXYGEN SATURATION: 98 % | HEIGHT: 63 IN

## 2019-05-06 DIAGNOSIS — E11.9 TYPE 2 DIABETES MELLITUS WITHOUT COMPLICATION, WITHOUT LONG-TERM CURRENT USE OF INSULIN (HCC): ICD-10-CM

## 2019-05-06 DIAGNOSIS — Z51.81 ENCOUNTER FOR MEDICATION MONITORING: ICD-10-CM

## 2019-05-06 DIAGNOSIS — I10 ESSENTIAL HYPERTENSION: Primary | ICD-10-CM

## 2019-05-06 DIAGNOSIS — E66.9 NON MORBID OBESITY: ICD-10-CM

## 2019-05-06 DIAGNOSIS — E78.2 MIXED HYPERLIPIDEMIA: ICD-10-CM

## 2019-05-06 LAB
GLUCOSE POC: 118 MG/DL
HBA1C MFR BLD HPLC: 6.5 %

## 2019-05-06 RX ORDER — AMLODIPINE BESYLATE 5 MG/1
5 TABLET ORAL DAILY
Qty: 90 TAB | Refills: 3 | Status: SHIPPED | OUTPATIENT
Start: 2019-05-06 | End: 2020-04-28

## 2019-05-06 NOTE — PROGRESS NOTES
HISTORY OF PRESENT ILLNESS Marisol Ozuna is a 68 y.o. female. HPI Follow up on BP, cholesterol and BS. Overall feeling well. HTN follow up: 
Compliant w/ meds, low salt diet, and exercise. No home bp monitoring. No swelling, headache or dizziness. No chest pain, SOB, palpitations. Otherwise feeling well since the last visit. DM type II follow up: 
Compliant w/ meds, diabetic diet, and exercise. Last a1c level was 6.8% in 5/2018. Obtains home glucose monitoring averaging 100-140s. BS was a little higher during the holidays. Checks BS BID on most days and prn. Pt does not have BS log at visit today. No Rf needed for today. Denies any tingling sensation, polyuria and polydipsia. No blurred vision. No significant weight changes. Feeling well since last OV. Hypercholesterolemia follow up: 
Compliant w/ low fat, low cholesterol diet. Exercising some. Currently not on medication. HM: 
Mammogram 1/24/2019 Bone density 12/4/2017 Patient states she had an eye exam 3/22/2019 at [facility]. Colonoscopy 3/4/2016 by Dr. Birdie Eaton- repeat in 10 years Patient Active Problem List  
Diagnosis Code  Right knee DJD M17.11  
 Arthritis M19.90  
 Cancer ([facility] [address] C80.1  Chronic pain G89.29  
 Hyperlipidemia E78.5  Hypovitaminosis D E55.9  Screen for colon cancer Z12.11  Type 2 diabetes mellitus without complication (HCC) A95.6  
 Essential hypertension I10  
 Encounter for medication monitoring Z51.81 Current Outpatient Medications Medication Sig Dispense Refill  amLODIPine (NORVASC) 5 mg tablet Take 1 Tab by mouth daily. 90 Tab 3  Blood-Glucose Meter (TRUE METRIX AIR GLUCOSE METER) misc Use to check blood sugar twice a day, E11.9 1 Each 0  
 glucose blood VI test strips (TRUE METRIX GLUCOSE TEST STRIP) strip Use to test blood sugar twice daily, E11.9 100 Strip 3  Blood Glucose Control, Low (TRUE METRIX LEVEL 1) soln Use as directed. E11.9 1 Each 3  
  metFORMIN (GLUCOPHAGE) 500 mg tablet Take 1,000 mg by mouth daily (with breakfast).  albuterol (PROVENTIL HFA, VENTOLIN HFA, PROAIR HFA) 90 mcg/actuation inhaler Take 2 Puffs by inhalation every four (4) hours as needed for Wheezing. 1 Inhaler 11  
 losartan-hydroCHLOROthiazide (HYZAAR) 100-12.5 mg per tablet TAKE 1 TABLET EVERY DAY 90 Tab 3  
 fluticasone (FLONASE) 50 mcg/actuation nasal spray SHAKE LIQUID AND USE 2 SPRAYS IN EACH NOSTRIL DAILY AS NEEDED FOR RHINITIS 48 g 3  
 aspirin delayed-release 81 mg tablet Take 1 Tab by mouth daily. 30 Tab 6  
 meloxicam (MOBIC) 15 mg tablet Take 15 mg by mouth daily as needed for Pain.  loratadine (CLARITIN) 10 mg tablet Take 1 tablet by mouth daily as needed for Allergies. 30 tablet 11  Lancets (PRODIGY TWIST TOP LANCET) misc Use to check blood glucose level twice a day and PRN. 200 Each 3  
 alcohol swabs (BD SINGLE USE SWABS REGULAR) padm Use to cleanse finger prior to checking glucose level. 200 Each 3  
 Cholecalciferol, Vitamin D3, (VITAMIN D3) 5,000 unit Tab Take 1 Cap by mouth daily.  Omega-3-DHA-EPA-Fish Oil 1,000 (120-180) mg Cap Take 1 Cap by mouth two (2) times a week. No Known Allergies Past Medical History:  
Diagnosis Date  Arthritis   
 knee  Cancer ([facility] [address] 2003  
 melanoma -right lower extremity  Chronic pain   
 left knee  Diabetes ([facility] [address] type 2  
 Hypertension  Nausea & vomiting  Screen for colon cancer 10/2/2014 Past Surgical History:  
Procedure Laterality Date  HX BREAST BIOPSY Right   
 yrs ago  HX CATARACT REMOVAL  2010  
 bilateral eyes  HX CHOLECYSTECTOMY  HX HYSTERECTOMY  1990  
 fibroid  HX KNEE ARTHROSCOPY  1999  
 left knee replacement  HX ORTHOPAEDIC    
 age 40- right foot  HX ORTHOPAEDIC  9/11/2015  
 left wrist surgery  HX OTHER SURGICAL  2005  
 permanent teeth replacements  HX OTHER SURGICAL Right 2003  
 melonoma removed rt leg  HX WISDOM TEETH EXTRACTION    
 [place] COLONOSCOPY FLX DX W/COLLJ SPEC WHEN PFRMD  2007  
 date and md unknown Family History Problem Relation Age of Onset  No Known Problems Mother  No Known Problems Father  Thyroid Disease Sister Social History Tobacco Use  Smoking status: Never Smoker  Smokeless tobacco: Never Used Substance Use Topics  Alcohol use: No  
  Alcohol/week: 0.0 oz Lab Results Component Value Date/Time WBC 3.5 12/31/2018 10:16 AM  
 HGB 14.0 12/31/2018 10:16 AM  
 Hemoglobin (POC) 15.3 12/27/2009 07:16 AM  
 HCT 43.0 12/31/2018 10:16 AM  
 Hematocrit (POC) 45 12/27/2009 07:16 AM  
 PLATELET 333 24/40/5555 10:16 AM  
 MCV 94 12/31/2018 10:16 AM  
 
Lab Results Component Value Date/Time Cholesterol, total 209 (H) 12/31/2018 10:16 AM  
 HDL Cholesterol 69 12/31/2018 10:16 AM  
 LDL, calculated 122 (H) 12/31/2018 10:16 AM  
 Triglyceride 91 12/31/2018 10:16 AM  
 CHOL/HDL Ratio 4.3 12/27/2009 07:40 AM  
 
Lab Results Component Value Date/Time TSH 2.220 09/05/2017 09:57 AM  
  
Lab Results Component Value Date/Time Sodium 142 12/31/2018 10:16 AM  
 Potassium 3.6 12/31/2018 10:16 AM  
 Chloride 101 12/31/2018 10:16 AM  
 CO2 25 12/31/2018 10:16 AM  
 Anion gap 9 10/22/2016 12:46 PM  
 Glucose 139 (H) 12/31/2018 10:16 AM  
 BUN 11 12/31/2018 10:16 AM  
 Creatinine 0.57 12/31/2018 10:16 AM  
 BUN/Creatinine ratio 19 12/31/2018 10:16 AM  
 GFR est  12/31/2018 10:16 AM  
 GFR est non-AA 93 12/31/2018 10:16 AM  
 Calcium 10.4 (H) 12/31/2018 10:16 AM  
 Bilirubin, total 0.5 12/31/2018 10:16 AM  
 ALT (SGPT) 12 12/31/2018 10:16 AM  
 AST (SGOT) 18 12/31/2018 10:16 AM  
 Alk. phosphatase 71 12/31/2018 10:16 AM  
 Protein, total 7.8 12/31/2018 10:16 AM  
 Albumin 5.0 (H) 12/31/2018 10:16 AM  
 Globulin 4.2 (H) 10/22/2016 12:46 PM  
 A-G Ratio 1.8 12/31/2018 10:16 AM  
  
Lab Results Component Value Date/Time Hemoglobin A1c 6.8 (H) 02/24/2014 09:00 AM  
 Hemoglobin A1c (POC) 6.5 05/06/2019 10:17 AM  
   
 
Review of Systems Constitutional: Negative for malaise/fatigue. HENT: Negative for congestion. Eyes: Negative for blurred vision. Respiratory: Negative for cough and shortness of breath. Cardiovascular: Negative for chest pain, palpitations and leg swelling. Gastrointestinal: Negative for abdominal pain, constipation and heartburn. Genitourinary: Negative for dysuria, frequency and urgency. Musculoskeletal: Negative for back pain and joint pain. Neurological: Negative for dizziness, tingling and headaches. Endo/Heme/Allergies: Positive for environmental allergies. Psychiatric/Behavioral: Negative for depression. The patient does not have insomnia. Physical Exam  
Constitutional: She appears well-developed and well-nourished. /88   Pulse 61   Temp 96.8 °F (36 °C) (Oral)   Resp 16   Ht 5' 3\" (1.6 m)   Wt 177 lb 3.2 oz (80.4 kg)   LMP 05/10/1990   SpO2 98%   BMI 31.39 kg/m² HENT:  
Right Ear: Tympanic membrane and ear canal normal.  
Left Ear: Tympanic membrane and ear canal normal.  
Nose: No mucosal edema or rhinorrhea. Mouth/Throat: Oropharynx is clear and moist and mucous membranes are normal.  
Neck: Normal range of motion. Neck supple. No thyromegaly present. Cardiovascular: Normal rate and regular rhythm. No murmur heard. Pulmonary/Chest: Effort normal and breath sounds normal.  
Abdominal: Soft. Bowel sounds are normal. There is no tenderness. Musculoskeletal: Normal range of motion. She exhibits no edema. Lymphadenopathy:  
  She has no cervical adenopathy. Skin: Skin is warm and dry. Psychiatric: She has a normal mood and affect. Nursing note and vitals reviewed. ASSESSMENT and PLAN Diagnoses and all orders for this visit: 1. Essential hypertension Not at goal.  She has been out of the norvasc for the past 2 weeks or more. -     Restart amLODIPine (NORVASC) 5 mg tablet; Take 1 Tab by mouth daily. Discussed sodium restriction, high k rich diet, maintaining ideal body weight and regular exercise program such as daily walking 30 min perday 4-5 times per week, as physiologic means to achieve blood pressure control.  Medication compliance advised. 2. Type 2 diabetes mellitus without complication, without long-term current use of insulin ([address] a1c level is stable at 6.5% -     AMB POC HEMOGLOBIN A1C 
-     AMB POC GLUCOSE, QUANTITATIVE, BLOOD 3. Mixed hyperlipidemia -     LIPID PANEL 4. Encounter for medication monitoring -     METABOLIC PANEL, COMPREHENSIVE 5. Non morbid obesity Discussed weight loss options, diet and exercise. Also reviewed health issues related to obesity. Initial goal of weight loss 10% of current weight. Counseled on goal for exercise of eventual goal of 30-60 minutes 5-7 times a week as per AHA guidelines. Decrease carbohydrates (white foods, sweet foods, sweet drinks and alcohol), increase green leafy vegetables and protein (lean meats and beans). Avoid fried foods. Increase water intake. Eat 3-5 small meals daily. Increase physical activity. Follow-up and Dispositions · Return in about 1 month (around 6/6/2019). reviewed diet, exercise and weight control 
cardiovascular risk and specific lipid/LDL goals reviewed 
reviewed medications and side effects in detail 
specific diabetic recommendations: low cholesterol diet, weight control and daily exercise discussed, home glucose monitoring emphasized, all medications, side effects and compliance discussed carefully, foot care discussed and Podiatry visits discussed, annual eye examinations at Ophthalmology discussed and glycohemoglobin and other lab monitoring discussed I have discussed diagnosis listed in this note with pt and/or family.  I have discussed treatment plans and options and the risk/benefit analysis of those options, including safe use of medications and possible medication side effects. Through the use of shared decision making we have agreed to the above plan. The patient has received an after-visit summary and questions were answered concerning future plans and follow up. Advise pt of any urgent changes then to proceed to the ER.

## 2019-05-06 NOTE — PROGRESS NOTES
Chief Complaint Patient presents with  Hypertension  
  follow up  Diabetes  
  follow up Patient states she  Is not taking Norvasc in about 2 weeks because she finished the prescription and wasn't sure if she should still be on it, 
 
Mammogram 1/24/2019 Bone density 12/4/2017 Patient states she had an eye exam 3/22/2019 at [facility]. Colonoscopy 3/4/2016 by Dr. Luc Elliott- repeat in 10 years 1. Have you been to the ER, urgent care clinic since your last visit? Hospitalized since your last visit? No 
 
2. Have you seen or consulted any other health care providers outside of the [address] [place] [address] since your last visit? Include any pap smears or colon screening. Mammogram 1/24/2019

## 2019-05-07 LAB
ALBUMIN SERPL-MCNC: 4.5 G/DL (ref 3.5–4.8)
ALBUMIN/GLOB SERPL: 1.7 {RATIO} (ref 1.2–2.2)
ALP SERPL-CCNC: 62 IU/L (ref 39–117)
ALT SERPL-CCNC: 9 IU/L (ref 0–32)
AST SERPL-CCNC: 10 IU/L (ref 0–40)
BILIRUB SERPL-MCNC: 0.7 MG/DL (ref 0–1.2)
BUN SERPL-MCNC: 11 MG/DL (ref 8–27)
BUN/CREAT SERPL: 18 (ref 12–28)
CALCIUM SERPL-MCNC: 9.9 MG/DL (ref 8.7–10.3)
CHLORIDE SERPL-SCNC: 102 MMOL/L (ref 96–106)
CHOLEST SERPL-MCNC: 198 MG/DL (ref 100–199)
CO2 SERPL-SCNC: 26 MMOL/L (ref 20–29)
CREAT SERPL-MCNC: 0.6 MG/DL (ref 0.57–1)
GLOBULIN SER CALC-MCNC: 2.7 G/DL (ref 1.5–4.5)
GLUCOSE SERPL-MCNC: 128 MG/DL (ref 65–99)
HDLC SERPL-MCNC: 67 MG/DL
INTERPRETATION, 910389: NORMAL
LDLC SERPL CALC-MCNC: 117 MG/DL (ref 0–99)
POTASSIUM SERPL-SCNC: 3.9 MMOL/L (ref 3.5–5.2)
PROT SERPL-MCNC: 7.2 G/DL (ref 6–8.5)
SODIUM SERPL-SCNC: 144 MMOL/L (ref 134–144)
TRIGL SERPL-MCNC: 68 MG/DL (ref 0–149)
VLDLC SERPL CALC-MCNC: 14 MG/DL (ref 5–40)

## 2019-06-17 NOTE — PROGRESS NOTES
HISTORY OF PRESENT ILLNESS  Maria Teresa Olivia is a 68 y.o. female. HPI   Follow up on BP, cholesterol and BS. Overall feeling well. HTN follow up:  Compliant w/ meds, low salt diet, and exercise. No home bp monitoring. No swelling, headache or dizziness. No chest pain, SOB, palpitations. Otherwise feeling well since the last visit. DM type II follow up:  Compliant w/ meds, diabetic diet, and exercise. Last a1c level was 6.8% in 5/2018. Obtains home glucose monitoring averaging 100-140s. BS was a little higher during the holidays. Checks BS BID on most days and prn. Pt does not have BS log at visit today. No Rf needed for today. Denies any tingling sensation, polyuria and polydipsia. No blurred vision. No significant weight changes. Feeling well since last OV. Hypercholesterolemia follow up:  Compliant w/ low fat, low cholesterol diet. Exercising some. Currently not on medication. HM:  Mammogram 1/24/2019  Bone density 12/4/2017  Patient states she had an eye exam 3/22/2019 at [facility]. Colonoscopy 3/4/2016 by Dr. Steffen Núñez- repeat in 10 years    Patient Active Problem List   Diagnosis Code    Right knee DJD M17.11    Arthritis M19.90    Cancer ([facility] [address] C80.1    Chronic pain G89.29    Hyperlipidemia E78.5    Hypovitaminosis D E55.9    Screen for colon cancer Z12.11    Type 2 diabetes mellitus without complication ([facility] [address] N23.3    Essential hypertension I10    Encounter for medication monitoring Z51.81       Current Outpatient Medications   Medication Sig Dispense Refill    amLODIPine (NORVASC) 5 mg tablet Take 1 Tab by mouth daily. 90 Tab 3    Blood-Glucose Meter (TRUE METRIX AIR GLUCOSE METER) misc Use to check blood sugar twice a day, E11.9 1 Each 0    glucose blood VI test strips (TRUE METRIX GLUCOSE TEST STRIP) strip Use to test blood sugar twice daily, E11.9 100 Strip 3    Blood Glucose Control, Low (TRUE METRIX LEVEL 1) soln Use as directed. E11.9 1 Each 3  metFORMIN (GLUCOPHAGE) 500 mg tablet Take 1,000 mg by mouth daily (with breakfast).  albuterol (PROVENTIL HFA, VENTOLIN HFA, PROAIR HFA) 90 mcg/actuation inhaler Take 2 Puffs by inhalation every four (4) hours as needed for Wheezing. 1 Inhaler 11    losartan-hydroCHLOROthiazide (HYZAAR) 100-12.5 mg per tablet TAKE 1 TABLET EVERY DAY 90 Tab 3    fluticasone (FLONASE) 50 mcg/actuation nasal spray SHAKE LIQUID AND USE 2 SPRAYS IN EACH NOSTRIL DAILY AS NEEDED FOR RHINITIS 48 g 3    aspirin delayed-release 81 mg tablet Take 1 Tab by mouth daily. 30 Tab 6    meloxicam (MOBIC) 15 mg tablet Take 15 mg by mouth daily as needed for Pain.  loratadine (CLARITIN) 10 mg tablet Take 1 tablet by mouth daily as needed for Allergies. 30 tablet 11    Lancets (PRODIGY TWIST TOP LANCET) misc Use to check blood glucose level twice a day and PRN. 200 Each 3    alcohol swabs (BD SINGLE USE SWABS REGULAR) padm Use to cleanse finger prior to checking glucose level. 200 Each 3    Cholecalciferol, Vitamin D3, (VITAMIN D3) 5,000 unit Tab Take 1 Cap by mouth daily.  Omega-3-DHA-EPA-Fish Oil 1,000 (120-180) mg Cap Take 1 Cap by mouth two (2) times a week.          No Known Allergies      Past Medical History:   Diagnosis Date    Arthritis     knee    Cancer ([facility] [address] 2003    melanoma -right lower extremity    Chronic pain     left knee    Diabetes ([facility] [address]     type 2    Hypertension     Nausea & vomiting     Screen for colon cancer 10/2/2014         Past Surgical History:   Procedure Laterality Date    HX BREAST BIOPSY Right     yrs ago    HX CATARACT REMOVAL  2010    bilateral eyes    HX CHOLECYSTECTOMY      HX HYSTERECTOMY  1990    fibroid    HX KNEE ARTHROSCOPY  1999    left knee replacement    HX ORTHOPAEDIC      age 40- right foot    HX ORTHOPAEDIC  9/11/2015    left wrist surgery    HX OTHER SURGICAL  2005    permanent teeth replacements    HX OTHER SURGICAL Right 2003    melonoma removed rt leg    HX WISDOM TEETH EXTRACTION      [place] COLONOSCOPY FLX DX W/COLLJ SPEC WHEN PFRMD  2007    date and md unknown         Family History   Problem Relation Age of Onset    No Known Problems Mother     No Known Problems Father     Thyroid Disease Sister        Social History     Tobacco Use    Smoking status: Never Smoker    Smokeless tobacco: Never Used   Substance Use Topics    Alcohol use: No     Alcohol/week: 0.0 oz     Lab Results   Component Value Date/Time    WBC 3.5 12/31/2018 10:16 AM    HGB 14.0 12/31/2018 10:16 AM    Hemoglobin (POC) 15.3 12/27/2009 07:16 AM    HCT 43.0 12/31/2018 10:16 AM    Hematocrit (POC) 45 12/27/2009 07:16 AM    PLATELET 740 41/01/6334 10:16 AM    MCV 94 12/31/2018 10:16 AM     Lab Results   Component Value Date/Time    Cholesterol, total 198 05/06/2019 10:17 AM    HDL Cholesterol 67 05/06/2019 10:17 AM    LDL, calculated 117 (H) 05/06/2019 10:17 AM    Triglyceride 68 05/06/2019 10:17 AM    CHOL/HDL Ratio 4.3 12/27/2009 07:40 AM     Lab Results   Component Value Date/Time    TSH 2.220 09/05/2017 09:57 AM      Lab Results   Component Value Date/Time    Sodium 144 05/06/2019 10:17 AM    Potassium 3.9 05/06/2019 10:17 AM    Chloride 102 05/06/2019 10:17 AM    CO2 26 05/06/2019 10:17 AM    Anion gap 9 10/22/2016 12:46 PM    Glucose 128 (H) 05/06/2019 10:17 AM    BUN 11 05/06/2019 10:17 AM    Creatinine 0.60 05/06/2019 10:17 AM    BUN/Creatinine ratio 18 05/06/2019 10:17 AM    GFR est  05/06/2019 10:17 AM    GFR est non-AA 91 05/06/2019 10:17 AM    Calcium 9.9 05/06/2019 10:17 AM    Bilirubin, total 0.7 05/06/2019 10:17 AM    ALT (SGPT) 9 05/06/2019 10:17 AM    AST (SGOT) 10 05/06/2019 10:17 AM    Alk.  phosphatase 62 05/06/2019 10:17 AM    Protein, total 7.2 05/06/2019 10:17 AM    Albumin 4.5 05/06/2019 10:17 AM    Globulin 4.2 (H) 10/22/2016 12:46 PM    A-G Ratio 1.7 05/06/2019 10:17 AM      Lab Results   Component Value Date/Time    Hemoglobin A1c 6.8 (H) 02/24/2014 09:00 AM Hemoglobin A1c (POC) 6.5 05/06/2019 10:17 AM           Review of Systems   Constitutional: Negative for malaise/fatigue. HENT: Negative for congestion. Eyes: Negative for blurred vision. Respiratory: Negative for cough and shortness of breath. Cardiovascular: Negative for chest pain, palpitations and leg swelling. Gastrointestinal: Negative for abdominal pain, constipation and heartburn. Genitourinary: Negative for dysuria, frequency and urgency. Musculoskeletal: Negative for back pain and joint pain. Neurological: Negative for dizziness, tingling and headaches. Endo/Heme/Allergies: Positive for environmental allergies. Psychiatric/Behavioral: Negative for depression. The patient does not have insomnia. Physical Exam   Constitutional: She appears well-developed and well-nourished. Visit Vitals  /80   Pulse 84   Temp 97.1 °F (36.2 °C) (Oral)   Resp 18   Ht 5' 3\" (1.6 m)   Wt 174 lb 9.6 oz (79.2 kg)   LMP 05/10/1990   SpO2 98%   BMI 30.93 kg/m²       HENT:   Right Ear: Tympanic membrane and ear canal normal.   Left Ear: Tympanic membrane and ear canal normal.   Nose: No mucosal edema or rhinorrhea. Mouth/Throat: Oropharynx is clear and moist and mucous membranes are normal.   Neck: Normal range of motion. Neck supple. No thyromegaly present. Cardiovascular: Normal rate and regular rhythm. No murmur heard. Pulmonary/Chest: Effort normal and breath sounds normal.   Abdominal: Soft. Bowel sounds are normal. There is no tenderness. Musculoskeletal: Normal range of motion. She exhibits no edema. Lymphadenopathy:     She has no cervical adenopathy. Skin: Skin is warm and dry. Psychiatric: She has a normal mood and affect. Nursing note and vitals reviewed. ASSESSMENT and PLAN  Diagnoses and all orders for this visit:    1. Essential hypertension  -     Increase losartan-hydroCHLOROthiazide (HYZAAR) 100-25 mg per tablet; Take 1 Tab by mouth daily.  *NEW DOSE*  Discussed sodium restriction, high k rich diet, maintaining ideal body weight and regular exercise program such as daily walking 30 min perday 4-5 times per week, as physiologic means to achieve blood pressure control.  Medication compliance advised. 2. Type 2 diabetes mellitus without complication, without long-term current use of insulin ([facility])  a1c level 6.6%  -     AMB POC HEMOGLOBIN A1C  -     AMB POC GLUCOSE, QUANTITATIVE, BLOOD    3. Mixed hyperlipidemia  -     LIPID PANEL    4. Encounter for medication monitoring  -     METABOLIC PANEL, COMPREHENSIVE    5. Non morbid obesity  I have reviewed/discussed the above normal BMI with the patient. I have recommended the following interventions: dietary management education, guidance, and counseling . Follow-up and Dispositions    · Return in about 3 months (around 9/18/2019) for medicare wellness exam.       reviewed diet, exercise and weight control  cardiovascular risk and specific lipid/LDL goals reviewed  reviewed medications and side effects in detail  specific diabetic recommendations: low cholesterol diet, weight control and daily exercise discussed, home glucose monitoring emphasized, all medications, side effects and compliance discussed carefully, foot care discussed and Podiatry visits discussed, annual eye examinations at Ophthalmology discussed and glycohemoglobin and other lab monitoring discussed     I have discussed diagnosis listed in this note with pt and/or family. I have discussed treatment plans and options and the risk/benefit analysis of those options, including safe use of medications and possible medication side effects. Through the use of shared decision making we have agreed to the above plan. The patient has received an after-visit summary and questions were answered concerning future plans and follow up. Advise pt of any urgent changes then to proceed to the ER.

## 2019-06-18 ENCOUNTER — OFFICE VISIT (OUTPATIENT)
Dept: FAMILY MEDICINE CLINIC | Age: 74
End: 2019-06-18

## 2019-06-18 VITALS
SYSTOLIC BLOOD PRESSURE: 138 MMHG | DIASTOLIC BLOOD PRESSURE: 80 MMHG | RESPIRATION RATE: 18 BRPM | HEIGHT: 63 IN | OXYGEN SATURATION: 98 % | HEART RATE: 84 BPM | TEMPERATURE: 97.1 F | BODY MASS INDEX: 30.94 KG/M2 | WEIGHT: 174.6 LBS

## 2019-06-18 DIAGNOSIS — Z51.81 ENCOUNTER FOR MEDICATION MONITORING: ICD-10-CM

## 2019-06-18 DIAGNOSIS — E11.9 TYPE 2 DIABETES MELLITUS WITHOUT COMPLICATION, WITHOUT LONG-TERM CURRENT USE OF INSULIN (HCC): ICD-10-CM

## 2019-06-18 DIAGNOSIS — E66.9 NON MORBID OBESITY: ICD-10-CM

## 2019-06-18 DIAGNOSIS — E78.2 MIXED HYPERLIPIDEMIA: ICD-10-CM

## 2019-06-18 DIAGNOSIS — I10 ESSENTIAL HYPERTENSION: Primary | ICD-10-CM

## 2019-06-18 LAB
GLUCOSE POC: 180 MG/DL
HBA1C MFR BLD HPLC: 6.6 %

## 2019-06-18 RX ORDER — LOSARTAN POTASSIUM AND HYDROCHLOROTHIAZIDE 12.5; 1 MG/1; MG/1
1 TABLET ORAL DAILY
Qty: 90 TAB | Refills: 3 | Status: SHIPPED | OUTPATIENT
Start: 2019-06-18 | End: 2019-06-18 | Stop reason: DRUGHIGH

## 2019-06-18 RX ORDER — LOSARTAN POTASSIUM AND HYDROCHLOROTHIAZIDE 25; 100 MG/1; MG/1
1 TABLET ORAL DAILY
Qty: 90 TAB | Refills: 1 | Status: SHIPPED | OUTPATIENT
Start: 2019-06-18 | End: 2020-07-31 | Stop reason: ALTCHOICE

## 2019-06-18 NOTE — PROGRESS NOTES
Chief Complaint   Patient presents with    Hypertension     1 month follow up       Mammogram 1/24/2019    Bone density 12/4/2017    Patient states she had an eye exam 3/22/2019 at [facility]. Colonoscopy 3/4/2016 by Dr. Fabian Rubio- repeat in 10 years    1. Have you been to the ER, urgent care clinic since your last visit? Hospitalized since your last visit? No    2. Have you seen or consulted any other health care providers outside of the [address] [place] [address] since your last visit? Include any pap smears or colon screening.  No

## 2019-06-19 LAB
ALBUMIN SERPL-MCNC: 4.3 G/DL (ref 3.5–4.8)
ALBUMIN/GLOB SERPL: 1.8 {RATIO} (ref 1.2–2.2)
ALP SERPL-CCNC: 60 IU/L (ref 39–117)
ALT SERPL-CCNC: 6 IU/L (ref 0–32)
AST SERPL-CCNC: 9 IU/L (ref 0–40)
BILIRUB SERPL-MCNC: 0.2 MG/DL (ref 0–1.2)
BUN SERPL-MCNC: 19 MG/DL (ref 8–27)
BUN/CREAT SERPL: 29 (ref 12–28)
CALCIUM SERPL-MCNC: 10 MG/DL (ref 8.7–10.3)
CHLORIDE SERPL-SCNC: 104 MMOL/L (ref 96–106)
CHOLEST SERPL-MCNC: 207 MG/DL (ref 100–199)
CO2 SERPL-SCNC: 28 MMOL/L (ref 20–29)
CREAT SERPL-MCNC: 0.66 MG/DL (ref 0.57–1)
GLOBULIN SER CALC-MCNC: 2.4 G/DL (ref 1.5–4.5)
GLUCOSE SERPL-MCNC: 180 MG/DL (ref 65–99)
HDLC SERPL-MCNC: 64 MG/DL
INTERPRETATION, 910389: NORMAL
LDLC SERPL CALC-MCNC: 121 MG/DL (ref 0–99)
POTASSIUM SERPL-SCNC: 3.7 MMOL/L (ref 3.5–5.2)
PROT SERPL-MCNC: 6.7 G/DL (ref 6–8.5)
SODIUM SERPL-SCNC: 144 MMOL/L (ref 134–144)
TRIGL SERPL-MCNC: 111 MG/DL (ref 0–149)
VLDLC SERPL CALC-MCNC: 22 MG/DL (ref 5–40)

## 2019-07-14 PROCEDURE — 99283 EMERGENCY DEPT VISIT LOW MDM: CPT

## 2019-07-15 ENCOUNTER — HOSPITAL ENCOUNTER (EMERGENCY)
Age: 74
Discharge: HOME | End: 2019-07-15
Attending: EMERGENCY MEDICINE
Payer: MEDICARE

## 2019-07-15 VITALS
HEIGHT: 64 IN | TEMPERATURE: 98.3 F | DIASTOLIC BLOOD PRESSURE: 79 MMHG | SYSTOLIC BLOOD PRESSURE: 169 MMHG | BODY MASS INDEX: 29.88 KG/M2 | OXYGEN SATURATION: 99 % | RESPIRATION RATE: 18 BRPM | WEIGHT: 175 LBS

## 2019-07-15 DIAGNOSIS — B34.9 VIRAL ILLNESS: ICD-10-CM

## 2019-07-15 DIAGNOSIS — R52 GENERALIZED BODY ACHES: Primary | ICD-10-CM

## 2019-07-15 PROCEDURE — 74011250637 HC RX REV CODE- 250/637: Performed by: EMERGENCY MEDICINE

## 2019-07-15 RX ORDER — IBUPROFEN 400 MG/1
800 TABLET ORAL ONCE
Status: COMPLETED | OUTPATIENT
Start: 2019-07-15 | End: 2019-07-15

## 2019-07-15 RX ADMIN — IBUPROFEN 800 MG: 400 TABLET ORAL at 01:13

## 2019-07-15 NOTE — ED NOTES
Discharge instructions were given to the patient by Thurman Alpers, RN. The patient left the Emergency Department ambulatory, alert and oriented and in no acute distress with 0 prescriptions. The patient was encouraged to call or return to the ED for worsening issues or problems and was encouraged to schedule a follow up appointment for continuing care. The patient verbalized understanding of discharge instructions and prescriptions, all questions were answered. The patient has no further concerns at this time.

## 2019-07-15 NOTE — ED TRIAGE NOTES
Patient reports to ed with complaints of body aches x 1 day. Patient reports \"my body just hurts all over\". Patient reports 9/10 generalized pain.

## 2019-07-15 NOTE — ED NOTES
Pt presents to ED ambulatory complaining of \"aches and pains all over\" with cough, and nasal congestion. Pt reports she just visited a family member in the hospital who was being treated for pneumonia. Pt is alert and oriented x 4, RR even and unlabored, skin is warm and dry. Assessment completed and pt updated on plan of care. Emergency Department Nursing Plan of Care       The Nursing Plan of Care is developed from the Nursing assessment and Emergency Department Attending provider initial evaluation. The plan of care may be reviewed in the ED Provider note.     The Plan of Care was developed with the following considerations:   Patient / Family readiness to learn indicated by:verbalized understanding  Persons(s) to be included in education: patient  Barriers to Learning/Limitations:No    Signed     Sushila Rivas RN    7/15/2019   1:03 AM

## 2019-07-15 NOTE — ED PROVIDER NOTES
EMERGENCY DEPARTMENT HISTORY AND PHYSICAL EXAM      Date: 7/15/2019  Patient Name: Louisa Rubio    History of Presenting Illness     Chief Complaint   Patient presents with    Cold Symptoms       History Provided By: Patient    HPI: Louisa Rubio, 69 y.o. female with PMHx significant for DM, HTN who presents the chief complaint of body aches since yesterday. Has also had some cough, congestion, and runny nose. Denies any fevers, chest pain, shortness of breath, abdominal pain, vomiting, diarrhea, urinary symptoms. Reports a cousin is in the hospital for PNA. Patient states she took some Metamucil at home but that did not help her symptoms so she came to the emergency department for further evaluation. PCP: Kofi Zuniga MD    There are no other complaints, changes, or physical findings at this time. Current Outpatient Medications   Medication Sig Dispense Refill    losartan-hydroCHLOROthiazide (HYZAAR) 100-25 mg per tablet Take 1 Tab by mouth daily. *NEW DOSE* 90 Tab 1    amLODIPine (NORVASC) 5 mg tablet Take 1 Tab by mouth daily. 90 Tab 3    Blood-Glucose Meter (TRUE METRIX AIR GLUCOSE METER) misc Use to check blood sugar twice a day, E11.9 1 Each 0    glucose blood VI test strips (TRUE METRIX GLUCOSE TEST STRIP) strip Use to test blood sugar twice daily, E11.9 100 Strip 3    Blood Glucose Control, Low (TRUE METRIX LEVEL 1) soln Use as directed. E11.9 1 Each 3    metFORMIN (GLUCOPHAGE) 500 mg tablet Take 1,000 mg by mouth daily (with breakfast).  albuterol (PROVENTIL HFA, VENTOLIN HFA, PROAIR HFA) 90 mcg/actuation inhaler Take 2 Puffs by inhalation every four (4) hours as needed for Wheezing. 1 Inhaler 11    fluticasone (FLONASE) 50 mcg/actuation nasal spray SHAKE LIQUID AND USE 2 SPRAYS IN EACH NOSTRIL DAILY AS NEEDED FOR RHINITIS 48 g 3    aspirin delayed-release 81 mg tablet Take 1 Tab by mouth daily.  30 Tab 6    meloxicam (MOBIC) 15 mg tablet Take 15 mg by mouth daily as needed for Pain.  loratadine (CLARITIN) 10 mg tablet Take 1 tablet by mouth daily as needed for Allergies. 30 tablet 11    Lancets (PRODIGY TWIST TOP LANCET) misc Use to check blood glucose level twice a day and PRN. 200 Each 3    alcohol swabs (BD SINGLE USE SWABS REGULAR) padm Use to cleanse finger prior to checking glucose level. 200 Each 3    Cholecalciferol, Vitamin D3, (VITAMIN D3) 5,000 unit Tab Take 1 Cap by mouth daily.  Omega-3-DHA-EPA-Fish Oil 1,000 (120-180) mg Cap Take 1 Cap by mouth two (2) times a week. Past History     Past Medical History:  Past Medical History:   Diagnosis Date    Arthritis     knee    Cancer ([facility] [address] 2003    melanoma -right lower extremity    Chronic pain     left knee    Diabetes ([facility] [address]     type 2    Hypertension     Nausea & vomiting     Screen for colon cancer 10/2/2014     Past Surgical History:  Past Surgical History:   Procedure Laterality Date    HX BREAST BIOPSY Right     yrs ago    HX CATARACT REMOVAL  2010    bilateral eyes    HX CHOLECYSTECTOMY      HX HYSTERECTOMY  1990    fibroid    HX KNEE ARTHROSCOPY  1999    left knee replacement    HX ORTHOPAEDIC      age 40- right foot    HX ORTHOPAEDIC  9/11/2015    left wrist surgery    HX OTHER SURGICAL  2005    permanent teeth replacements    HX OTHER SURGICAL Right 2003    melonoma removed rt leg    HX WISDOM TEETH EXTRACTION      [place] COLONOSCOPY FLX DX W/COLLJ SPEC WHEN PFRMD  2007    date and md unknown     Family History:  Family History   Problem Relation Age of Onset    No Known Problems Mother     No Known Problems Father     Thyroid Disease Sister      Social History:  Social History     Tobacco Use    Smoking status: Never Smoker    Smokeless tobacco: Never Used   Substance Use Topics    Alcohol use: No     Alcohol/week: 0.0 oz    Drug use: No     Allergies:  No Known Allergies  Review of Systems   Review of Systems   Constitutional: Negative for chills and fever.    HENT: Positive for congestion and rhinorrhea. Negative for sore throat. Respiratory: Negative for cough and shortness of breath. Cardiovascular: Negative for chest pain. Gastrointestinal: Negative for abdominal pain, nausea and vomiting. Genitourinary: Negative for dysuria and urgency. Musculoskeletal: Positive for myalgias (diffuse). Skin: Negative for rash. Neurological: Negative for dizziness, light-headedness and headaches. All other systems reviewed and are negative. Physical Exam   Physical Exam   Constitutional: She is oriented to person, place, and time. She appears well-developed and well-nourished. No distress. HENT:   Head: Normocephalic and atraumatic. Eyes: Pupils are equal, round, and reactive to light. Conjunctivae and EOM are normal.   Neck: Normal range of motion. Cardiovascular: Normal rate, regular rhythm and intact distal pulses. Pulmonary/Chest: Effort normal and breath sounds normal. No stridor. No respiratory distress. Abdominal: Soft. She exhibits no distension. There is no tenderness. Musculoskeletal: Normal range of motion. Neurological: She is alert and oriented to person, place, and time. Skin: Skin is warm and dry. Psychiatric: She has a normal mood and affect. Nursing note and vitals reviewed. Diagnostic Study Results   Labs -   No results found for this or any previous visit (from the past 12 hour(s)). Radiologic Studies -   No orders to display     No results found. Medical Decision Making   I am the first provider for this patient. I reviewed the vital signs, available nursing notes, past medical history, past surgical history, family history and social history. Vital Signs-Reviewed the patient's vital signs.   Patient Vitals for the past 12 hrs:   Temp Resp BP SpO2   07/15/19 0018 98.3 °F (36.8 °C) 18 169/79 99 %       Pulse Oximetry Analysis - 99% on RA      Records Reviewed: Nursing Notes and Old Medical Records    Provider Notes (Medical Decision Making):   Patient presents with body aches, likely viral illness. She is afebrile here with stable vital signs. No focal lung findings on exam, no productive cough to suggest pneumonia. No imaging at this time. Will treat with Motrin and reevaluate. ED Course:   Initial assessment performed. The patients presenting problems have been discussed, and they are in agreement with the care plan formulated and outlined with them. I have encouraged them to ask questions as they arise throughout their visit. ED Course as of Jul 15 0744   Mon Jul 15, 2019   0132 Patient feeling better  Asking to go home  States she has motrin at home    [ELSY]      ED Course User Index  Amparo Geller MD       Critical Care:  none    Disposition:  Discharge Note:  1:33 AM  The patient has been re-evaluated and is ready for discharge. Reviewed available results with patient. Counseled patient on diagnosis and care plan. Patient has expressed understanding, and all questions have been answered. Patient agrees with plan and agrees to follow up as recommended, or to return to the ED if their symptoms worsen. Discharge instructions have been provided and explained to the patient, along with reasons to return to the ED. PLAN:  1. Discharge Medication List as of 7/15/2019  1:33 AM        2. Follow-up Information     Follow up With Specialties Details Why Contact Info    Victoriano Ferrell MD Family Practice Schedule an appointment as soon as possible for a visit  [address] [phone]      [facility] [place] EMERGENCY DEPT Emergency Medicine  As needed, If symptoms worsen [place]  [phone]        Return to ED if worse     Diagnosis     Clinical Impression:   1. Generalized body aches    2. Viral illness        This note will not be viewable in [employer].     Please note that this dictation was completed with [employer], the computer voice recognition software. Quite often unanticipated grammatical, syntax, homophones, and other interpretive errors are inadvertently transcribed by the computer software. Please disregard these errors.   Please excuse any errors that have escaped final proofreading

## 2019-07-15 NOTE — DISCHARGE INSTRUCTIONS
Patient Education        Viral Infections: Care Instructions  Your Care Instructions    You don't feel well, but it's not clear what's causing it. You may have a viral infection. Viruses cause many illnesses, such as the common cold, influenza, fever, rashes, and the diarrhea, nausea, and vomiting that are often called \"stomach flu. \" You may wonder if antibiotic medicines could make you feel better. But antibiotics only treat infections caused by bacteria. They don't work on viruses. The good news is that viral infections usually aren't serious. Most will go away in a few days without medical treatment. In the meantime, there are a few things you can do to make yourself more comfortable. Follow-up care is a key part of your treatment and safety. Be sure to make and go to all appointments, and call your doctor if you are having problems. It's also a good idea to know your test results and keep a list of the medicines you take. How can you care for yourself at home? · Get plenty of rest if you feel tired. · Take an over-the-counter pain medicine if needed, such as acetaminophen (Tylenol), ibuprofen (Advil, Motrin), or naproxen (Aleve). Read and follow all instructions on the label. · Be careful when taking over-the-counter cold or flu medicines and Tylenol at the same time. Many of these medicines have acetaminophen, which is Tylenol. Read the labels to make sure that you are not taking more than the recommended dose. Too much acetaminophen (Tylenol) can be harmful. · Drink plenty of fluids, enough so that your urine is light yellow or clear like water. If you have kidney, heart, or liver disease and have to limit fluids, talk with your doctor before you increase the amount of fluids you drink. · Stay home from work, school, and other public places while you have a fever. When should you call for help? Call 911 anytime you think you may need emergency care.  For example, call if:    · You have severe trouble breathing.     · You passed out (lost consciousness).    Call your doctor now or seek immediate medical care if:    · You seem to be getting much sicker.     · You have a new or higher fever.     · You have blood in your stools.     · You have new belly pain, or your pain gets worse.     · You have a new rash.    Watch closely for changes in your health, and be sure to contact your doctor if:    · You start to get better and then get worse.     · You do not get better as expected. Where can you learn more? Go to http://alfonso-kassi.info/. Enter I687 in the search box to learn more about \"Viral Infections: Care Instructions. \"  Current as of: July 30, 2018  Content Version: 11.9  © 9208-0237 [employer]. Care instructions adapted under license by [employer] (which disclaims liability or warranty for this information). If you have questions about a medical condition or this instruction, always ask your healthcare professional. [place] [address] any warranty or liability for your use of this information.

## 2019-10-06 DIAGNOSIS — Z91.09 ENVIRONMENTAL ALLERGIES: ICD-10-CM

## 2019-10-07 DIAGNOSIS — Z91.09 ENVIRONMENTAL ALLERGIES: ICD-10-CM

## 2019-10-07 RX ORDER — FLUTICASONE PROPIONATE 50 MCG
SPRAY, SUSPENSION (ML) NASAL
Qty: 1 BOTTLE | Refills: 11 | Status: SHIPPED | OUTPATIENT
Start: 2019-10-07 | End: 2019-10-07 | Stop reason: SDUPTHER

## 2019-10-07 RX ORDER — FLUTICASONE PROPIONATE 50 MCG
SPRAY, SUSPENSION (ML) NASAL
Qty: 1 BOTTLE | Refills: 11 | Status: SHIPPED | OUTPATIENT
Start: 2019-10-07 | End: 2020-10-19

## 2019-10-21 RX ORDER — AZITHROMYCIN 250 MG/1
TABLET, FILM COATED ORAL
Qty: 6 TAB | Refills: 0 | Status: SHIPPED | OUTPATIENT
Start: 2019-10-21 | End: 2019-10-26

## 2019-10-21 NOTE — TELEPHONE ENCOUNTER
Patient called stating that she would like an antibiotic. Patient stated she called out of work today and would be able to come in to be seen. Patient can be reached at [phone].

## 2019-10-21 NOTE — TELEPHONE ENCOUNTER
Since Thursday patient has had symptoms of congestion ,low grade fever, cough, some SOB & wheezing , hoarse, sneezing, has used nose spray, antihistamine and inhaler, better but not well. Requesting antibiotic.

## 2019-11-26 NOTE — PROGRESS NOTES
Chief Complaint   Patient presents with    Diabetes     follow up    Hypertension     follow up         Mammogram 1/24/2019    Bone density 12/4/2017    Patient states she had an eye exam 3/22/2019 at [facility]. Colonoscopy 3/4/2016 by Dr. Victor M Mauro- repeat in 10 years. Verbal order received by Dr. Rupinder Pugh dose flu vaccine IM. Pt received high dose flu vaccine IM in left deltoid without any difficulty. 1. Have you been to the ER, urgent care clinic since your last visit? Hospitalized since your last visit? No    2. Have you seen or consulted any other health care providers outside of the [address] [place] [address] since your last visit? Include any pap smears or colon screening.  no

## 2019-11-26 NOTE — PROGRESS NOTES
HISTORY OF PRESENT ILLNESS  Nadia Blanco is a 69 y.o. female. HPI   Follow up on BP, cholesterol and BS. Overall feeling well. HTN follow up:  Compliant w/ meds, low salt diet, and exercise. No home bp monitoring. No swelling, headache or dizziness. No chest pain, SOB, palpitations. Otherwise feeling well since the last visit. DM type II follow up:  Compliant w/ meds, diabetic diet, and exercise. Obtains home glucose monitoring averaging 100-140s. Checks BS BID on most days and prn. Pt does not have BS log at visit today. No Rf needed for today. Denies any tingling sensation, polyuria and polydipsia. No blurred vision. No significant weight changes. Feeling well since last OV. Hypercholesterolemia follow up:  Compliant w/ low fat, low cholesterol diet. Exercising some. Currently not on medication. HM:  Mammogram 1/24/2019   Bone density 12/4/2017   Patient states she had an eye exam 3/22/2019 at [facility].   Colonoscopy 3/4/2016 by Dr. Malia Broderick- sania in 10 years    Patient Active Problem List   Diagnosis Code    Right knee DJD M17.11    Arthritis M19.90    Cancer ([facility] [address] C80.1    Chronic pain G89.29    Hyperlipidemia E78.5    Hypovitaminosis D E55.9    Type 2 diabetes mellitus without complication ([facility] [address] D59.2    Essential hypertension I10    Encounter for medication monitoring Z51.81       Current Outpatient Medications   Medication Sig Dispense Refill    fluticasone propionate (FLONASE) 50 mcg/actuation nasal spray SHAKE LIQUID AND USE 2 SPRAYS IN EACH NOSTRIL DAILY AS NEEDED FOR RHINITIS 1 Bottle 11    losartan-hydroCHLOROthiazide (HYZAAR) 100-25 mg per tablet Take 1 Tab by mouth daily. *NEW DOSE* 90 Tab 1    amLODIPine (NORVASC) 5 mg tablet Take 1 Tab by mouth daily.  90 Tab 3    Blood-Glucose Meter (TRUE METRIX AIR GLUCOSE METER) [facility] Use to check blood sugar twice a day, E11.9 1 Each 0    glucose blood VI test strips (TRUE METRIX GLUCOSE TEST STRIP) strip Use to test blood sugar twice daily, E11.9 100 Strip 3    Blood Glucose Control, Low (TRUE METRIX LEVEL 1) soln Use as directed. E11.9 1 Each 3    metFORMIN (GLUCOPHAGE) 500 mg tablet Take 1,000 mg by mouth daily (with breakfast).  albuterol (PROVENTIL HFA, VENTOLIN HFA, PROAIR HFA) 90 mcg/actuation inhaler Take 2 Puffs by inhalation every four (4) hours as needed for Wheezing. 1 Inhaler 11    aspirin delayed-release 81 mg tablet Take 1 Tab by mouth daily. 30 Tab 6    meloxicam (MOBIC) 15 mg tablet Take 15 mg by mouth daily as needed for Pain.  loratadine (CLARITIN) 10 mg tablet Take 1 tablet by mouth daily as needed for Allergies. 30 tablet 11    Lancets (PRODIGY TWIST TOP LANCET) misc Use to check blood glucose level twice a day and PRN. 200 Each 3    alcohol swabs (BD SINGLE USE SWABS REGULAR) padm Use to cleanse finger prior to checking glucose level. 200 Each 3    Cholecalciferol, Vitamin D3, (VITAMIN D3) 5,000 unit Tab Take 1 Cap by mouth daily.  Omega-3-DHA-EPA-Fish Oil 1,000 (120-180) mg Cap Take 1 Cap by mouth two (2) times a week.          No Known Allergies    Past Medical History:   Diagnosis Date    Arthritis     knee    Cancer ([facility] [address] 2003    melanoma -right lower extremity    Chronic pain     left knee    Diabetes ([facility] [address]     type 2    Hypertension     Nausea & vomiting     Screen for colon cancer 10/2/2014       Past Surgical History:   Procedure Laterality Date    HX BREAST BIOPSY Right     yrs ago    HX CATARACT REMOVAL  2010    bilateral eyes    HX CHOLECYSTECTOMY      HX HYSTERECTOMY  1990    fibroid    HX KNEE ARTHROSCOPY  1999    left knee replacement    HX ORTHOPAEDIC      age 40- right foot    HX ORTHOPAEDIC  9/11/2015    left wrist surgery    HX OTHER SURGICAL  2005    permanent teeth replacements    HX OTHER SURGICAL Right 2003    melonoma removed rt leg    HX WISDOM TEETH EXTRACTION      [place] COLONOSCOPY FLX DX W/COLLJ SPEC WHEN PFRMD  2007    date and md unknown       Family History   Problem Relation Age of Onset    No Known Problems Mother     No Known Problems Father     Thyroid Disease Sister        Social History     Tobacco Use    Smoking status: Never Smoker    Smokeless tobacco: Never Used   Substance Use Topics    Alcohol use: No     Alcohol/week: 0.0 standard drinks        Lab Results   Component Value Date/Time    WBC 3.5 12/31/2018 10:16 AM    HGB 14.0 12/31/2018 10:16 AM    Hemoglobin (POC) 15.3 12/27/2009 07:16 AM    HCT 43.0 12/31/2018 10:16 AM    Hematocrit (POC) 45 12/27/2009 07:16 AM    PLATELET 119 63/65/5675 10:16 AM    MCV 94 12/31/2018 10:16 AM     Lab Results   Component Value Date/Time    Cholesterol, total 207 (H) 06/18/2019 03:25 PM    HDL Cholesterol 64 06/18/2019 03:25 PM    LDL, calculated 121 (H) 06/18/2019 03:25 PM    Triglyceride 111 06/18/2019 03:25 PM    CHOL/HDL Ratio 4.3 12/27/2009 07:40 AM     Lab Results   Component Value Date/Time    TSH 2.220 09/05/2017 09:57 AM      Lab Results   Component Value Date/Time    Sodium 144 06/18/2019 03:25 PM    Potassium 3.7 06/18/2019 03:25 PM    Chloride 104 06/18/2019 03:25 PM    CO2 28 06/18/2019 03:25 PM    Anion gap 9 10/22/2016 12:46 PM    Glucose 180 (H) 06/18/2019 03:25 PM    BUN 19 06/18/2019 03:25 PM    Creatinine 0.66 06/18/2019 03:25 PM    BUN/Creatinine ratio 29 (H) 06/18/2019 03:25 PM    GFR est  06/18/2019 03:25 PM    GFR est non-AA 88 06/18/2019 03:25 PM    Calcium 10.0 06/18/2019 03:25 PM    Bilirubin, total 0.2 06/18/2019 03:25 PM    ALT (SGPT) 6 06/18/2019 03:25 PM    AST (SGOT) 9 06/18/2019 03:25 PM    Alk.  phosphatase 60 06/18/2019 03:25 PM    Protein, total 6.7 06/18/2019 03:25 PM    Albumin 4.3 06/18/2019 03:25 PM    Globulin 4.2 (H) 10/22/2016 12:46 PM    A-G Ratio 1.8 06/18/2019 03:25 PM      Lab Results   Component Value Date/Time    Hemoglobin A1c 6.8 (H) 02/24/2014 09:00 AM    Hemoglobin A1c (POC) 6.6 06/18/2019 03:35 PM         Review of Systems Constitutional: Negative for malaise/fatigue. HENT: Negative for congestion. Eyes: Negative for blurred vision. Respiratory: Negative for cough and shortness of breath. Cardiovascular: Negative for chest pain, palpitations and leg swelling. Gastrointestinal: Negative for abdominal pain, constipation and heartburn. Genitourinary: Negative for dysuria, frequency and urgency. Musculoskeletal: Negative for back pain and joint pain. Neurological: Negative for dizziness, tingling and headaches. Endo/Heme/Allergies: Negative for environmental allergies. Psychiatric/Behavioral: Negative for depression. The patient does not have insomnia. Physical Exam  Vitals signs and nursing note reviewed. Constitutional:       Appearance: Normal appearance. She is well-developed. Comments: /80   Pulse 66   Temp 97.9 °F (36.6 °C) (Oral)   Resp 16   Ht 5' 4\" (1.626 m)   Wt 172 lb 9.6 oz (78.3 kg)   LMP 05/10/1990   SpO2 99%   BMI 29.63 kg/m²    HENT:      Right Ear: Tympanic membrane and ear canal normal.      Left Ear: Tympanic membrane and ear canal normal.      Nose: No mucosal edema or rhinorrhea. Neck:      Musculoskeletal: Normal range of motion and neck supple. Thyroid: No thyromegaly. Cardiovascular:      Rate and Rhythm: Normal rate and regular rhythm. Heart sounds: Normal heart sounds. No gallop. Pulmonary:      Effort: Pulmonary effort is normal.      Breath sounds: Normal breath sounds. Abdominal:      General: Bowel sounds are normal.      Palpations: Abdomen is soft. There is no mass. Tenderness: There is no tenderness. Musculoskeletal: Normal range of motion. Lymphadenopathy:      Cervical: No cervical adenopathy. Skin:     General: Skin is warm and dry. ASSESSMENT and PLAN  Diagnoses and all orders for this visit:    1. Essential hypertension  She thinks that her BP is up d/t eating chinese food last night.   Discussed sodium restriction, high k rich diet, maintaining ideal body weight and regular exercise program such as daily walking 30 min perday 4-5 times per week, as physiologic means to achieve blood pressure control.  Medication compliance advised. 2. Type 2 diabetes mellitus without complication, without long-term current use of insulin ([facility])  a1c level is 6.3%  -     MICROALBUMIN, UR, RAND W/ MICROALB/CREAT RATIO  -     AMB POC HEMOGLOBIN A1C  -     AMB POC GLUCOSE, QUANTITATIVE, BLOOD  -     AMB POC URINALYSIS DIP STICK AUTO W/O MICRO    3. Mixed hyperlipidemia  -     LIPID PANEL    4. Encounter for medication monitoring  -     CBC W/O DIFF  -     METABOLIC PANEL, COMPREHENSIVE    5. Non morbid obesity  I have reviewed/discussed the above normal BMI with the patient. I have recommended the following interventions: dietary management education, guidance, and counseling . Follow-up and Dispositions    · Return in about 3 months (around 2/27/2020) for medicare wellness exam.       reviewed diet, exercise and weight control  cardiovascular risk and specific lipid/LDL goals reviewed  reviewed medications and side effects in detail  specific diabetic recommendations: low cholesterol diet, weight control and daily exercise discussed, home glucose monitoring emphasized, all medications, side effects and compliance discussed carefully, foot care discussed and Podiatry visits discussed, annual eye examinations at Ophthalmology discussed and glycohemoglobin and other lab monitoring discussed     I have discussed diagnosis listed in this note with pt and/or family. I have discussed treatment plans and options and the risk/benefit analysis of those options, including safe use of medications and possible medication side effects. Through the use of shared decision making we have agreed to the above plan. The patient has received an after-visit summary and questions were answered concerning future plans and follow up.   Advise pt of any urgent changes then to proceed to the ER.

## 2019-11-27 ENCOUNTER — OFFICE VISIT (OUTPATIENT)
Dept: FAMILY MEDICINE CLINIC | Age: 74
End: 2019-11-27

## 2019-11-27 VITALS
HEIGHT: 64 IN | RESPIRATION RATE: 16 BRPM | WEIGHT: 172.6 LBS | SYSTOLIC BLOOD PRESSURE: 148 MMHG | BODY MASS INDEX: 29.47 KG/M2 | HEART RATE: 66 BPM | DIASTOLIC BLOOD PRESSURE: 80 MMHG | TEMPERATURE: 97.9 F | OXYGEN SATURATION: 99 %

## 2019-11-27 DIAGNOSIS — E11.9 TYPE 2 DIABETES MELLITUS WITHOUT COMPLICATION, WITHOUT LONG-TERM CURRENT USE OF INSULIN (HCC): ICD-10-CM

## 2019-11-27 DIAGNOSIS — I10 ESSENTIAL HYPERTENSION: Primary | ICD-10-CM

## 2019-11-27 DIAGNOSIS — Z23 ENCOUNTER FOR IMMUNIZATION: ICD-10-CM

## 2019-11-27 DIAGNOSIS — Z51.81 ENCOUNTER FOR MEDICATION MONITORING: ICD-10-CM

## 2019-11-27 DIAGNOSIS — E78.2 MIXED HYPERLIPIDEMIA: ICD-10-CM

## 2019-11-27 DIAGNOSIS — E66.9 NON MORBID OBESITY: ICD-10-CM

## 2019-11-27 LAB
BILIRUB UR QL STRIP: NEGATIVE
GLUCOSE POC: 139 MG/DL
GLUCOSE UR-MCNC: NEGATIVE MG/DL
HBA1C MFR BLD HPLC: 6.3 %
KETONES P FAST UR STRIP-MCNC: NEGATIVE MG/DL
PH UR STRIP: 6.5 [PH] (ref 4.6–8)
PROT UR QL STRIP: NEGATIVE
SP GR UR STRIP: 1.01 (ref 1–1.03)
UA UROBILINOGEN AMB POC: NORMAL (ref 0.2–1)
URINALYSIS CLARITY POC: CLEAR
URINALYSIS COLOR POC: YELLOW
URINE BLOOD POC: NEGATIVE
URINE LEUKOCYTES POC: NORMAL
URINE NITRITES POC: NEGATIVE

## 2019-11-28 LAB
ALBUMIN SERPL-MCNC: 4.6 G/DL (ref 3.5–4.8)
ALBUMIN/CREAT UR: 21.3 MG/G CREAT (ref 0–30)
ALBUMIN/GLOB SERPL: 1.8 {RATIO} (ref 1.2–2.2)
ALP SERPL-CCNC: 63 IU/L (ref 39–117)
ALT SERPL-CCNC: 7 IU/L (ref 0–32)
AST SERPL-CCNC: 13 IU/L (ref 0–40)
BILIRUB SERPL-MCNC: 0.5 MG/DL (ref 0–1.2)
BUN SERPL-MCNC: 14 MG/DL (ref 8–27)
BUN/CREAT SERPL: 27 (ref 12–28)
CALCIUM SERPL-MCNC: 9.7 MG/DL (ref 8.7–10.3)
CHLORIDE SERPL-SCNC: 100 MMOL/L (ref 96–106)
CHOLEST SERPL-MCNC: 217 MG/DL (ref 100–199)
CO2 SERPL-SCNC: 26 MMOL/L (ref 20–29)
CREAT SERPL-MCNC: 0.51 MG/DL (ref 0.57–1)
CREAT UR-MCNC: 43.7 MG/DL
ERYTHROCYTE [DISTWIDTH] IN BLOOD BY AUTOMATED COUNT: 12.5 % (ref 12.3–15.4)
GLOBULIN SER CALC-MCNC: 2.5 G/DL (ref 1.5–4.5)
GLUCOSE SERPL-MCNC: 131 MG/DL (ref 65–99)
HCT VFR BLD AUTO: 39.1 % (ref 34–46.6)
HDLC SERPL-MCNC: 72 MG/DL
HGB BLD-MCNC: 13.1 G/DL (ref 11.1–15.9)
INTERPRETATION, 910389: NORMAL
LDLC SERPL CALC-MCNC: 131 MG/DL (ref 0–99)
Lab: NORMAL
Lab: NORMAL
MCH RBC QN AUTO: 30.9 PG (ref 26.6–33)
MCHC RBC AUTO-ENTMCNC: 33.5 G/DL (ref 31.5–35.7)
MCV RBC AUTO: 92 FL (ref 79–97)
MICROALBUMIN UR-MCNC: 9.3 UG/ML
PLATELET # BLD AUTO: 249 X10E3/UL (ref 150–450)
POTASSIUM SERPL-SCNC: 3.8 MMOL/L (ref 3.5–5.2)
PROT SERPL-MCNC: 7.1 G/DL (ref 6–8.5)
RBC # BLD AUTO: 4.24 X10E6/UL (ref 3.77–5.28)
SODIUM SERPL-SCNC: 144 MMOL/L (ref 134–144)
TRIGL SERPL-MCNC: 69 MG/DL (ref 0–149)
VLDLC SERPL CALC-MCNC: 14 MG/DL (ref 5–40)
WBC # BLD AUTO: 3 X10E3/UL (ref 3.4–10.8)

## 2019-12-05 ENCOUNTER — TELEPHONE (OUTPATIENT)
Dept: FAMILY MEDICINE CLINIC | Age: 74
End: 2019-12-05

## 2019-12-05 RX ORDER — ROSUVASTATIN CALCIUM 10 MG/1
10 TABLET, COATED ORAL
Qty: 30 TAB | Refills: 5 | Status: SHIPPED | OUTPATIENT
Start: 2019-12-05 | End: 2019-12-05 | Stop reason: SDUPTHER

## 2019-12-12 ENCOUNTER — TELEPHONE (OUTPATIENT)
Dept: FAMILY MEDICINE CLINIC | Age: 74
End: 2019-12-12

## 2019-12-12 NOTE — TELEPHONE ENCOUNTER
Pt p#[phone], pt states she saw Arden Aguirre out last night and she told her to call an appt with her for her foot, she needs an xray. 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

## 2019-12-13 ENCOUNTER — TELEPHONE (OUTPATIENT)
Dept: FAMILY MEDICINE CLINIC | Age: 74
End: 2019-12-13

## 2019-12-13 NOTE — TELEPHONE ENCOUNTER
----- Message from [facility] sent at 12/13/2019 10:10 AM EST -----  Regarding: Dr. Marie Gambier: [phone]  Patient return call    Best contact number(s): [phone]      Whose call is being returned: Dr. Ryan Reid

## 2019-12-13 NOTE — TELEPHONE ENCOUNTER
Spoke to patient , she is having pain in the same foot she had surgery on several years ago , she will call the doctor who performed that surgery , she will call office back if she continues to have concerns.

## 2019-12-26 DIAGNOSIS — E11.9 TYPE 2 DIABETES MELLITUS WITHOUT COMPLICATION, WITHOUT LONG-TERM CURRENT USE OF INSULIN (HCC): Primary | ICD-10-CM

## 2019-12-26 DIAGNOSIS — E78.2 MIXED HYPERLIPIDEMIA: ICD-10-CM

## 2019-12-26 NOTE — TELEPHONE ENCOUNTER
----- Message from Bill Monge sent at 12/26/2019  1:45 PM EST -----  Regarding: Dr. Ish Orona Edi/refill  Contact: [phone]  Caller (if not patient):n/a  Relationship of caller (if not patient): n/a  Best contact number(s): [phone]  Name of medication and dosage if known: Diabetes strips  Is patient out of this medication (yes/no): yes  Pharmacy name: [address] [place] [address] listed in chart? (yes/no): yes  Pharmacy phone number: [phone]  Date of last visit: 11/27/19  Details to clarify the request: Patient also says she received message from Dr. Yamel Magdaleno that her medication for \"rosubastaitan\" for her cholesterol was supposed to be for 5 mg but pharmacy gave her 10 mg and would like prescription to be switched and filled; patient also says she went back on her diet

## 2019-12-27 RX ORDER — ROSUVASTATIN CALCIUM 10 MG/1
5 TABLET, COATED ORAL
Qty: 30 TAB | Refills: 0 | Status: SHIPPED | OUTPATIENT
Start: 2019-12-27 | End: 2020-08-04 | Stop reason: SDUPTHER

## 2020-01-24 DIAGNOSIS — I10 ESSENTIAL HYPERTENSION: ICD-10-CM

## 2020-01-24 RX ORDER — LOSARTAN POTASSIUM AND HYDROCHLOROTHIAZIDE 12.5; 5 MG/1; MG/1
1 TABLET ORAL DAILY
Qty: 90 TAB | Refills: 3 | OUTPATIENT
Start: 2020-01-24

## 2020-01-24 RX ORDER — LOSARTAN POTASSIUM AND HYDROCHLOROTHIAZIDE 12.5; 1 MG/1; MG/1
1 TABLET ORAL DAILY
Qty: 90 TAB | Refills: 3 | Status: SHIPPED | OUTPATIENT
Start: 2020-01-24 | End: 2020-07-31 | Stop reason: ALTCHOICE

## 2020-01-24 NOTE — TELEPHONE ENCOUNTER
Please refill losartan  100-12.5 mg  Patient is out of medication    Mrs. Jc Shin  [phone] before  2

## 2020-01-27 RX ORDER — LOSARTAN POTASSIUM 100 MG/1
100 TABLET ORAL DAILY
Qty: 90 TAB | Refills: 0 | Status: SHIPPED | OUTPATIENT
Start: 2020-01-27 | End: 2020-04-20

## 2020-01-27 RX ORDER — HYDROCHLOROTHIAZIDE 12.5 MG/1
12.5 TABLET ORAL DAILY
Qty: 90 TAB | Refills: 0 | Status: SHIPPED | OUTPATIENT
Start: 2020-01-27 | End: 2020-04-20

## 2020-01-27 NOTE — TELEPHONE ENCOUNTER
Genny sent fax stating that Losartan/HCTZ 100/12.5mg is on backorder. Asking for new prescription. Attaching separate prescriptions for losartan 100mg and HCTZ 12.5mg. Last visit:11/27/19  Next visit:2/17/20  Previous refill 1/24/20(90 tabs) Medication on backorder    Requested Prescriptions     Pending Prescriptions Disp Refills    losartan (COZAAR) 100 mg tablet 90 Tab 0     Sig: Take 1 Tab by mouth daily.  hydroCHLOROthiazide (HYDRODIURIL) 12.5 mg tablet 90 Tab 0     Sig: Take 1 Tab by mouth daily.          Thanks,  Patel Serna

## 2020-03-09 RX ORDER — AMOXICILLIN 500 MG/1
500 CAPSULE ORAL 3 TIMES DAILY
Qty: 30 CAP | Refills: 0 | Status: SHIPPED | OUTPATIENT
Start: 2020-03-09 | End: 2020-03-19

## 2020-03-09 NOTE — TELEPHONE ENCOUNTER
Patient complaining of body aches on Saturday , stuffy, cough, sore throat, hoarsness, no fever or chills    Taking Mucinex , nose spray, Ibuprofen     Requesting to be seen or antibiotic. 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

## 2020-03-09 NOTE — TELEPHONE ENCOUNTER
----- Message from Susie Kang sent at 3/9/2020  8:17 AM EDT -----  Regarding: Dr. John Hensley: [phone]  Appointment not available    Caller's first and last name and relationship to patient (if not the patient):      Best contact number:[phone]      Preferred date and time:anytime today      Scheduled appointment date and time:      Reason for appointment:upper respiratory symptoms      Details to clarify the request:      Susie Kang

## 2020-03-10 ENCOUNTER — TELEPHONE (OUTPATIENT)
Dept: FAMILY MEDICINE CLINIC | Age: 75
End: 2020-03-10

## 2020-03-10 DIAGNOSIS — M85.80 OSTEOPENIA, UNSPECIFIED LOCATION: Primary | ICD-10-CM

## 2020-03-10 NOTE — TELEPHONE ENCOUNTER
Patient would like for Carolina Mohs to put an order in for a Bone density she can be reached @ [phone]

## 2020-03-17 RX ORDER — IBUPROFEN 800 MG/1
800 TABLET ORAL
Qty: 30 TAB | Refills: 0 | Status: SHIPPED | OUTPATIENT
Start: 2020-03-17 | End: 2020-10-30

## 2020-03-17 NOTE — TELEPHONE ENCOUNTER
Re-scheduled appointment due to Covid 9 recommendations from [facility]      Appointment 7/31 @ 10:15 am

## 2020-03-19 ENCOUNTER — HOSPITAL ENCOUNTER (OUTPATIENT)
Dept: BONE DENSITY | Age: 75
Discharge: HOME | End: 2020-03-19
Attending: FAMILY MEDICINE
Payer: MEDICARE

## 2020-03-19 ENCOUNTER — HOSPITAL ENCOUNTER (OUTPATIENT)
Dept: MAMMOGRAPHY | Age: 75
Discharge: HOME | End: 2020-03-19
Attending: FAMILY MEDICINE
Payer: MEDICARE

## 2020-03-19 DIAGNOSIS — Z12.31 VISIT FOR SCREENING MAMMOGRAM: ICD-10-CM

## 2020-03-19 DIAGNOSIS — M85.80 OSTEOPENIA, UNSPECIFIED LOCATION: ICD-10-CM

## 2020-03-19 PROCEDURE — 77067 SCR MAMMO BI INCL CAD: CPT

## 2020-03-19 PROCEDURE — 77080 DXA BONE DENSITY AXIAL: CPT

## 2020-03-24 DIAGNOSIS — E11.9 TYPE 2 DIABETES MELLITUS WITHOUT COMPLICATION, WITHOUT LONG-TERM CURRENT USE OF INSULIN (HCC): ICD-10-CM

## 2020-03-26 DIAGNOSIS — E11.9 TYPE 2 DIABETES MELLITUS WITHOUT COMPLICATION, WITHOUT LONG-TERM CURRENT USE OF INSULIN (HCC): ICD-10-CM

## 2020-04-27 DIAGNOSIS — I10 ESSENTIAL HYPERTENSION: ICD-10-CM

## 2020-04-28 RX ORDER — AMLODIPINE BESYLATE 5 MG/1
TABLET ORAL
Qty: 90 TAB | Refills: 3 | Status: SHIPPED | OUTPATIENT
Start: 2020-04-28 | End: 2020-07-31 | Stop reason: SDUPTHER

## 2020-06-29 DIAGNOSIS — E11.9 TYPE 2 DIABETES MELLITUS WITHOUT COMPLICATION, WITHOUT LONG-TERM CURRENT USE OF INSULIN (HCC): ICD-10-CM

## 2020-06-29 RX ORDER — CALCIUM CITRATE/VITAMIN D3 200MG-6.25
TABLET ORAL
Qty: 200 STRIP | Refills: 3 | Status: SHIPPED | OUTPATIENT
Start: 2020-06-29 | End: 2020-06-30 | Stop reason: SDUPTHER

## 2020-06-29 RX ORDER — CALCIUM CITRATE/VITAMIN D3 200MG-6.25
TABLET ORAL
Qty: 100 STRIP | Refills: 0 | OUTPATIENT
Start: 2020-06-29

## 2020-06-29 NOTE — TELEPHONE ENCOUNTER
Patient wants to get glucose blood VI test strips (TRUE METRIX GLUCOSE TEST STRIP) strip .   [phone]

## 2020-06-30 DIAGNOSIS — E11.9 TYPE 2 DIABETES MELLITUS WITHOUT COMPLICATION, WITHOUT LONG-TERM CURRENT USE OF INSULIN (HCC): ICD-10-CM

## 2020-06-30 RX ORDER — CALCIUM CITRATE/VITAMIN D3 200MG-6.25
TABLET ORAL
Qty: 100 STRIP | Refills: 11 | Status: SHIPPED | OUTPATIENT
Start: 2020-06-30 | End: 2020-10-30 | Stop reason: SDUPTHER

## 2020-07-16 ENCOUNTER — TELEPHONE (OUTPATIENT)
Dept: FAMILY MEDICINE CLINIC | Age: 75
End: 2020-07-16

## 2020-07-16 NOTE — TELEPHONE ENCOUNTER
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

## 2020-07-16 NOTE — TELEPHONE ENCOUNTER
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

## 2020-07-31 ENCOUNTER — OFFICE VISIT (OUTPATIENT)
Dept: FAMILY MEDICINE CLINIC | Age: 75
End: 2020-07-31

## 2020-07-31 VITALS
DIASTOLIC BLOOD PRESSURE: 82 MMHG | BODY MASS INDEX: 31.15 KG/M2 | SYSTOLIC BLOOD PRESSURE: 154 MMHG | TEMPERATURE: 97.1 F | HEIGHT: 63 IN | WEIGHT: 175.8 LBS

## 2020-07-31 DIAGNOSIS — E78.2 MIXED HYPERLIPIDEMIA: ICD-10-CM

## 2020-07-31 DIAGNOSIS — Z51.81 ENCOUNTER FOR MEDICATION MONITORING: ICD-10-CM

## 2020-07-31 DIAGNOSIS — I10 ESSENTIAL HYPERTENSION: ICD-10-CM

## 2020-07-31 DIAGNOSIS — E66.9 NON MORBID OBESITY: ICD-10-CM

## 2020-07-31 DIAGNOSIS — Z00.00 MEDICARE ANNUAL WELLNESS VISIT, SUBSEQUENT: Primary | ICD-10-CM

## 2020-07-31 DIAGNOSIS — E11.9 TYPE 2 DIABETES MELLITUS WITHOUT COMPLICATION, WITHOUT LONG-TERM CURRENT USE OF INSULIN (HCC): ICD-10-CM

## 2020-07-31 LAB
GLUCOSE POC: 125 MG/DL
HBA1C MFR BLD HPLC: 6.8 %
HEMOCCULT STL QL: NEGATIVE
VALID INTERNAL CONTROL?: YES

## 2020-07-31 RX ORDER — ETODOLAC 400 MG/1
400 TABLET, FILM COATED ORAL 2 TIMES DAILY WITH MEALS
COMMUNITY
End: 2020-10-30 | Stop reason: ALTCHOICE

## 2020-07-31 RX ORDER — AMLODIPINE BESYLATE 5 MG/1
TABLET ORAL
Qty: 180 TAB | Refills: 3 | Status: SHIPPED | OUTPATIENT
Start: 2020-07-31 | End: 2021-04-12

## 2020-07-31 NOTE — PROGRESS NOTES
Chief Complaint   Patient presents with   24 [facility] Annual Wellness Visit     1. Have you been to the ER, urgent care clinic since your last visit? Hospitalized since your last visit? Yes pain in left shoulder    2. Have you seen or consulted any other health care providers outside of the [address] [place] [address] since your last visit? Include any pap smears or colon screening. Yes , Ortho for left shoulder, Dr. Daina Hernandez and going to PT twice a week.       Mammogram  1/2020    Bone Density  3/10/2020    Colonoscopy 3/2016  10 years Thanjan    A1C  11/27/19    Micro Albunim  1/26/2019    Eye Exam 3/23/2019

## 2020-07-31 NOTE — PROGRESS NOTES
HISTORY OF PRESENT ILLNESS  Laury Layton is a 77 y.o. female. HPI   Follow up on BP, cholesterol and BS. Overall feeling well. HTN follow up:  Compliant w/ meds, low salt diet, and exercise. No home bp monitoring. No swelling, headache or dizziness. No chest pain, SOB, palpitations. Otherwise feeling well since the last visit. DM type II follow up:  Compliant w/ meds, diabetic diet, and exercise. Obtains home glucose monitoring averaging 100-140s. Checks BS BID on most days and prn. Pt does not have BS log at visit today. No Rf needed for today. Denies any tingling sensation, polyuria and polydipsia. No blurred vision. No significant weight changes. Feeling well since last OV. Hypercholesterolemia follow up:  Compliant w/ low fat, low cholesterol diet. Exercising some. Tolerating the crestor.        HM:  Mammogram 1/2020  Patient states she had an eye exam 3/22/2019 at [facility].   Colonoscopy 3/4/2016 by Dr. Beth Driver- repeat in 10 years    Patient Active Problem List   Diagnosis Code    Right knee DJD M17.11    Arthritis M19.90    Chronic pain G89.29    Hyperlipidemia E78.5    Hypovitaminosis D E55.9    Type 2 diabetes mellitus without complication ([facility] [address] F81.9    Essential hypertension I10    Encounter for medication monitoring Z51.81       Current Outpatient Medications   Medication Sig Dispense Refill    glucose blood VI test strips (True Metrix Glucose Test Strip) strip USE TO TEST BLOOD SUGAR TWICE DAILY, E11.9 100 Strip 11    amLODIPine (NORVASC) 5 mg tablet TAKE 1 TABLET BY MOUTH DAILY 90 Tab 3    losartan (COZAAR) 100 mg tablet TAKE 1 TABLET BY MOUTH DAILY 90 Tab 1    hydroCHLOROthiazide (HYDRODIURIL) 12.5 mg tablet TAKE 1 TABLET BY MOUTH DAILY 90 Tab 1    glucose blood VI test strips (True Metrix Glucose Test Strip) strip USE TO CHECK BLOOD SUGAR TWICE DAILY 100 Strip 0    losartan-hydroCHLOROthiazide (HYZAAR) 100-12.5 mg per tablet Take 1 Tab by mouth daily. 90 Tab 3    rosuvastatin (CRESTOR) 10 mg tablet Take 0.5 Tabs by mouth nightly. For cholesterol 30 Tab 0    fluticasone propionate (FLONASE) 50 mcg/actuation nasal spray SHAKE LIQUID AND USE 2 SPRAYS IN EACH NOSTRIL DAILY AS NEEDED FOR RHINITIS 1 Bottle 11    losartan-hydroCHLOROthiazide (HYZAAR) 100-25 mg per tablet Take 1 Tab by mouth daily. *NEW DOSE* 90 Tab 1    Blood-Glucose Meter (TRUE METRIX AIR GLUCOSE METER) [facility] Use to check blood sugar twice a day, E11.9 1 Each 0    Blood Glucose Control, Low (TRUE METRIX LEVEL 1) soln Use as directed. E11.9 1 Each 3    metFORMIN (GLUCOPHAGE) 500 mg tablet Take 500 mg by mouth two (2) times daily (with meals).  albuterol (PROVENTIL HFA, VENTOLIN HFA, PROAIR HFA) 90 mcg/actuation inhaler Take 2 Puffs by inhalation every four (4) hours as needed for Wheezing. 1 Inhaler 11    aspirin delayed-release 81 mg tablet Take 1 Tab by mouth daily. 30 Tab 6    meloxicam (MOBIC) 15 mg tablet Take 15 mg by mouth daily as needed for Pain.  loratadine (CLARITIN) 10 mg tablet Take 1 tablet by mouth daily as needed for Allergies. 30 tablet 11    Lancets (PRODIGY TWIST TOP LANCET) misc Use to check blood glucose level twice a day and PRN. 200 Each 3    alcohol swabs (BD SINGLE USE SWABS REGULAR) padm Use to cleanse finger prior to checking glucose level. 200 Each 3    Cholecalciferol, Vitamin D3, (VITAMIN D3) 5,000 unit Tab Take 1 Cap by mouth daily.  Omega-3-DHA-EPA-Fish Oil 1,000 (120-180) mg Cap Take 1 Cap by mouth two (2) times a week.          No Known Allergies      Past Medical History:   Diagnosis Date    Arthritis     knee    Cancer ([address] 2003    melanoma -right lower extremity    Chronic pain     left knee    Diabetes ([facility] [address]     type 2    Hypertension     Nausea & vomiting     Screen for colon cancer 10/2/2014         Past Surgical History:   Procedure Laterality Date    HX BREAST BIOPSY Right     yrs ago    HX CATARACT REMOVAL  2010 bilateral eyes    HX CHOLECYSTECTOMY      HX HYSTERECTOMY  1990    fibroid    HX KNEE ARTHROSCOPY  1999    left knee replacement    HX ORTHOPAEDIC      age 40- right foot    HX ORTHOPAEDIC  9/11/2015    left wrist surgery    HX OTHER SURGICAL  2005    permanent teeth replacements    HX OTHER SURGICAL Right 2003    melonoma removed rt leg    HX WISDOM TEETH EXTRACTION      DC COLONOSCOPY FLX DX W/COLLJ SPEC WHEN PFRMD  2007    date and md unknown         Family History   Problem Relation Age of Onset    No Known Problems Mother     No Known Problems Father     Thyroid Disease Sister        Social History     Tobacco Use    Smoking status: Never Smoker    Smokeless tobacco: Never Used   Substance Use Topics    Alcohol use: No     Alcohol/week: 0.0 standard drinks        Lab Results   Component Value Date/Time    WBC 3.0 (L) 11/27/2019 08:10 AM    HGB 13.1 11/27/2019 08:10 AM    Hemoglobin (POC) 15.3 12/27/2009 07:16 AM    HCT 39.1 11/27/2019 08:10 AM    Hematocrit (POC) 45 12/27/2009 07:16 AM    PLATELET 677 82/99/4397 08:10 AM    MCV 92 11/27/2019 08:10 AM     Lab Results   Component Value Date/Time    Cholesterol, total 217 (H) 11/27/2019 08:10 AM    HDL Cholesterol 72 11/27/2019 08:10 AM    LDL, calculated 131 (H) 11/27/2019 08:10 AM    Triglyceride 69 11/27/2019 08:10 AM    CHOL/HDL Ratio 4.3 12/27/2009 07:40 AM     Lab Results   Component Value Date/Time    TSH 2.220 09/05/2017 09:57 AM      Lab Results   Component Value Date/Time    Sodium 144 11/27/2019 08:10 AM    Potassium 3.8 11/27/2019 08:10 AM    Chloride 100 11/27/2019 08:10 AM    CO2 26 11/27/2019 08:10 AM    Anion gap 9 10/22/2016 12:46 PM    Glucose 131 (H) 11/27/2019 08:10 AM    BUN 14 11/27/2019 08:10 AM    Creatinine 0.51 (L) 11/27/2019 08:10 AM    BUN/Creatinine ratio 27 11/27/2019 08:10 AM    GFR est  11/27/2019 08:10 AM    GFR est non-AA 96 11/27/2019 08:10 AM    Calcium 9.7 11/27/2019 08:10 AM    Bilirubin, total 0.5 11/27/2019 08:10 AM    ALT (SGPT) 7 11/27/2019 08:10 AM    Alk. phosphatase 63 11/27/2019 08:10 AM    Protein, total 7.1 11/27/2019 08:10 AM    Albumin 4.6 11/27/2019 08:10 AM    Globulin 4.2 (H) 10/22/2016 12:46 PM    A-G Ratio 1.8 11/27/2019 08:10 AM      Lab Results   Component Value Date/Time    Hemoglobin A1c 6.8 (H) 02/24/2014 09:00 AM    Hemoglobin A1c (POC) 6.3 11/27/2019 08:00 AM         Review of Systems   Constitutional: Negative for malaise/fatigue. HENT: Negative for congestion. Eyes: Negative for blurred vision. Respiratory: Negative for cough and shortness of breath. Cardiovascular: Negative for chest pain, palpitations and leg swelling. Gastrointestinal: Negative for abdominal pain, constipation and heartburn. Genitourinary: Negative for dysuria, frequency and urgency. Musculoskeletal: Negative for back pain and joint pain. Neurological: Negative for dizziness, tingling and headaches. Endo/Heme/Allergies: Negative for environmental allergies. Psychiatric/Behavioral: Negative for depression. The patient does not have insomnia. Physical Exam  Vitals signs and nursing note reviewed. Constitutional:       Appearance: Normal appearance. She is well-developed. Comments: /82 (BP 1 Location: Left arm, BP Patient Position: Sitting)   Pulse (P) 84   Temp 97.1 °F (36.2 °C) (Temporal)   Resp (P) 16   Ht 5' 3\" (1.6 m)   Wt 175 lb 12.8 oz (79.7 kg)   LMP 05/10/1990   SpO2 (P) 98%   BMI 31.14 kg/m²    HENT:      Right Ear: Tympanic membrane and ear canal normal.      Left Ear: Tympanic membrane and ear canal normal.      Nose: No mucosal edema or rhinorrhea. Neck:      Musculoskeletal: Normal range of motion and neck supple. Thyroid: No thyromegaly. Vascular: No carotid bruit. Cardiovascular:      Rate and Rhythm: Normal rate and regular rhythm. Pulses: Normal pulses. Heart sounds: Normal heart sounds. No gallop.     Pulmonary:      Effort: Pulmonary effort is normal.      Breath sounds: Normal breath sounds. Chest:      Breasts:         Right: Normal.         Left: Normal.   Abdominal:      General: Bowel sounds are normal.      Palpations: Abdomen is soft. There is no mass. Tenderness: There is no abdominal tenderness. Genitourinary:     Rectum: Guaiac result negative. Musculoskeletal: Normal range of motion. General: No swelling. Comments: Foot exam done . Normal sensation to PP, LT and vibration. Sensation intact to microfilament testing. Pulses intact. No swelling. No skin lesions or sores noted. No tinea present. Lymphadenopathy:      Cervical: No cervical adenopathy. Upper Body:      Right upper body: No supraclavicular, axillary or pectoral adenopathy. Left upper body: No supraclavicular, axillary or pectoral adenopathy. Skin:     General: Skin is warm and dry. Neurological:      General: No focal deficit present. Mental Status: She is alert and oriented to person, place, and time. Psychiatric:         Mood and Affect: Mood normal.         Thought Content: Thought content normal.         ASSESSMENT and PLAN  Diagnoses and all orders for this visit:    1. Medicare annual wellness visit, subsequent    2. Essential hypertension  Discussed sodium restriction, high k rich diet, maintaining ideal body weight and regular exercise program such as daily walking 30 min perday 4-5 times per week, as physiologic means to achieve blood pressure control.  Medication compliance advised. -     Increase amLODIPine (NORVASC) 5 mg tablet; TAKE 2 TABLETS BY MOUTH DAILY    3. Type 2 diabetes mellitus without complication, without long-term current use of insulin (HCC)  a1c up to 6.8%  -     AMB POC HEMOGLOBIN A1C  -     AMB POC GLUCOSE, QUANTITATIVE, BLOOD    4. Mixed hyperlipidemia  -     LIPID PANEL    5. Encounter for medication monitoring  -     METABOLIC PANEL, COMPREHENSIVE  -     CBC W/O DIFF    6.  Non morbid obesity  I have reviewed/discussed the above normal BMI with the patient. I have recommended the following interventions: dietary management education, guidance, and counseling . Follow-up and Dispositions    · Return in about 3 months (around 10/31/2020). reviewed diet, exercise and weight control  cardiovascular risk and specific lipid/LDL goals reviewed  reviewed medications and side effects in detail  specific diabetic recommendations: low cholesterol diet, weight control and daily exercise discussed, all medications, side effects and compliance discussed carefully, foot care discussed and Podiatry visits discussed, annual eye examinations at Ophthalmology discussed and glycohemoglobin and other lab monitoring discussed     I have discussed diagnosis listed in this note with pt and/or family. I have discussed treatment plans and options and the risk/benefit analysis of those options, including safe use of medications and possible medication side effects. Through the use of shared decision making we have agreed to the above plan. The patient has received an after-visit summary and questions were answered concerning future plans and follow up. Advise pt of any urgent changes then to proceed to the ER.

## 2020-07-31 NOTE — PROGRESS NOTES
This is a Subsequent Medicare Annual Wellness Exam (AWV) (Performed 12 months after IPPE or effective date of Medicare Part B enrollment)    I have reviewed the patient's medical history in detail and updated the computerized patient record. History     Patient Active Problem List   Diagnosis Code    Right knee DJD M17.11    Arthritis M19.90    Chronic pain G89.29    Hyperlipidemia E78.5    Hypovitaminosis D E55.9    Type 2 diabetes mellitus without complication (HCC) W64.6    Essential hypertension I10    Encounter for medication monitoring Z51.81       Current Outpatient Medications   Medication Sig Dispense Refill    glucose blood VI test strips (True Metrix Glucose Test Strip) strip USE TO TEST BLOOD SUGAR TWICE DAILY, E11.9 100 Strip 11    amLODIPine (NORVASC) 5 mg tablet TAKE 1 TABLET BY MOUTH DAILY 90 Tab 3    losartan (COZAAR) 100 mg tablet TAKE 1 TABLET BY MOUTH DAILY 90 Tab 1    hydroCHLOROthiazide (HYDRODIURIL) 12.5 mg tablet TAKE 1 TABLET BY MOUTH DAILY 90 Tab 1    glucose blood VI test strips (True Metrix Glucose Test Strip) strip USE TO CHECK BLOOD SUGAR TWICE DAILY 100 Strip 0    losartan-hydroCHLOROthiazide (HYZAAR) 100-12.5 mg per tablet Take 1 Tab by mouth daily. 90 Tab 3    rosuvastatin (CRESTOR) 10 mg tablet Take 0.5 Tabs by mouth nightly. For cholesterol 30 Tab 0    fluticasone propionate (FLONASE) 50 mcg/actuation nasal spray SHAKE LIQUID AND USE 2 SPRAYS IN EACH NOSTRIL DAILY AS NEEDED FOR RHINITIS 1 Bottle 11    losartan-hydroCHLOROthiazide (HYZAAR) 100-25 mg per tablet Take 1 Tab by mouth daily. *NEW DOSE* 90 Tab 1    Blood-Glucose Meter (TRUE METRIX AIR GLUCOSE METER) [facility] Use to check blood sugar twice a day, E11.9 1 Each 0    Blood Glucose Control, Low (TRUE METRIX LEVEL 1) soln Use as directed. E11.9 1 Each 3    metFORMIN (GLUCOPHAGE) 500 mg tablet Take 500 mg by mouth two (2) times daily (with meals).       albuterol (PROVENTIL HFA, VENTOLIN HFA, PROAIR HFA) 90 mcg/actuation inhaler Take 2 Puffs by inhalation every four (4) hours as needed for Wheezing. 1 Inhaler 11    aspirin delayed-release 81 mg tablet Take 1 Tab by mouth daily. 30 Tab 6    meloxicam (MOBIC) 15 mg tablet Take 15 mg by mouth daily as needed for Pain.  loratadine (CLARITIN) 10 mg tablet Take 1 tablet by mouth daily as needed for Allergies. 30 tablet 11    Lancets (PRODIGY TWIST TOP LANCET) misc Use to check blood glucose level twice a day and PRN. 200 Each 3    alcohol swabs (BD SINGLE USE SWABS REGULAR) padm Use to cleanse finger prior to checking glucose level. 200 Each 3    Cholecalciferol, Vitamin D3, (VITAMIN D3) 5,000 unit Tab Take 1 Cap by mouth daily.  Omega-3-DHA-EPA-Fish Oil 1,000 (120-180) mg Cap Take 1 Cap by mouth two (2) times a week.          No Known Allergies    Past Medical History:   Diagnosis Date    Arthritis     knee    Cancer ([facility] [address] 2003    melanoma -right lower extremity    Chronic pain     left knee    Diabetes ([facility] [address]     type 2    Hypertension     Nausea & vomiting     Screen for colon cancer 10/2/2014       Past Surgical History:   Procedure Laterality Date    HX BREAST BIOPSY Right     yrs ago    HX CATARACT REMOVAL  2010    bilateral eyes    HX CHOLECYSTECTOMY      HX HYSTERECTOMY  1990    fibroid    HX KNEE ARTHROSCOPY  1999    left knee replacement    HX ORTHOPAEDIC      age 40- right foot    HX ORTHOPAEDIC  9/11/2015    left wrist surgery    HX OTHER SURGICAL  2005    permanent teeth replacements    HX OTHER SURGICAL Right 2003    melonoma removed rt leg    HX WISDOM TEETH EXTRACTION      MD COLONOSCOPY FLX DX W/COLLJ SPEC WHEN PFRMD  2007    date and md unknown       Family History   Problem Relation Age of Onset    No Known Problems Mother     No Known Problems Father     Thyroid Disease Sister        Social History     Tobacco Use    Smoking status: Never Smoker    Smokeless tobacco: Never Used   Substance Use Topics    Alcohol use: No     Alcohol/week: 0.0 standard drinks         Depression Risk Factor Screening:     PHQ over the last two weeks 11/7/2017   Little interest or pleasure in doing things Not at all   Feeling down, depressed or hopeless Not at all   Total Score PHQ 2 0     Alcohol Risk Factor Screening: You do not drink alcohol or very rarely. Functional Ability and Level of Safety:   Hearing Loss  Hearing is good. Activities of Daily Living  The home contains: no safety equipment. Patient does total self care    Fall RiskFall Risk Assessment, last 12 mths 11/7/2017   Able to walk? Yes   Fall in past 12 months? No     Functional Ability:   Does the patient exhibit a steady gait? yes    How long did it take the patient to get up and walk from a sitting position? seconds    Is the patient self reliant? (ie can do own laundry, meals, household chores)  yes   Does the patient handle his/her own medications? yes   Does the patient handle his/her own money? yes   Is the patients home safe (ie good lighting, handrails on stairs and bath, etc.)? yes   Did you notice or did patient express any hearing difficulties? no   Did you notice or did patient express any vision difficulties? no        Advance Care Planning:   Patient was offered the opportunity to discuss advance care planning:  yes    Does patient have an Advance Directive:  no   If no, did you provide information on Caring Connections? yes      Abuse Screen  Patient is not abused    Cognitive Screening   Evaluation of Cognitive Function:  Has your family/caregiver stated any concerns about your memory: no        Patient Care Team   Patient Care Team:  Frances Levy MD as PCP - General    Assessment/Plan   Education and counseling provided:  Are appropriate based on today's review and evaluation  End-of-Life planning (with patient's consent)    ASSESSMENT and PLAN    Medicare Annual Wellness  Continue current treatment plan. Continue annual follow up. I have discussed diagnosis listed in this note with pt and/or family. I have discussed treatment plans and options and the risk/benefit analysis of those options, including safe use of medications and possible medication side effects. Through the use of shared decision making we have agreed to the above plan. The patient has received an after-visit summary and questions were answered concerning future plans and follow up. Advise pt of any urgent changes then to proceed to the ER.

## 2020-08-01 LAB
ALBUMIN SERPL-MCNC: 4.8 G/DL (ref 3.7–4.7)
ALBUMIN/GLOB SERPL: 1.9 {RATIO} (ref 1.2–2.2)
ALP SERPL-CCNC: 66 IU/L (ref 39–117)
ALT SERPL-CCNC: 7 IU/L (ref 0–32)
AST SERPL-CCNC: 10 IU/L (ref 0–40)
BILIRUB SERPL-MCNC: 0.5 MG/DL (ref 0–1.2)
BUN SERPL-MCNC: 16 MG/DL (ref 8–27)
BUN/CREAT SERPL: 28 (ref 12–28)
CALCIUM SERPL-MCNC: 10 MG/DL (ref 8.7–10.3)
CHLORIDE SERPL-SCNC: 100 MMOL/L (ref 96–106)
CHOLEST SERPL-MCNC: 217 MG/DL (ref 100–199)
CO2 SERPL-SCNC: 25 MMOL/L (ref 20–29)
CREAT SERPL-MCNC: 0.57 MG/DL (ref 0.57–1)
ERYTHROCYTE [DISTWIDTH] IN BLOOD BY AUTOMATED COUNT: 12.3 % (ref 11.7–15.4)
GLOBULIN SER CALC-MCNC: 2.5 G/DL (ref 1.5–4.5)
GLUCOSE SERPL-MCNC: 128 MG/DL (ref 65–99)
HCT VFR BLD AUTO: 41.3 % (ref 34–46.6)
HDLC SERPL-MCNC: 73 MG/DL
HGB BLD-MCNC: 13.7 G/DL (ref 11.1–15.9)
INTERPRETATION, 910389: NORMAL
LDLC SERPL CALC-MCNC: 131 MG/DL (ref 0–99)
MCH RBC QN AUTO: 30.7 PG (ref 26.6–33)
MCHC RBC AUTO-ENTMCNC: 33.2 G/DL (ref 31.5–35.7)
MCV RBC AUTO: 93 FL (ref 79–97)
PLATELET # BLD AUTO: 262 X10E3/UL (ref 150–450)
POTASSIUM SERPL-SCNC: 4.4 MMOL/L (ref 3.5–5.2)
PROT SERPL-MCNC: 7.3 G/DL (ref 6–8.5)
RBC # BLD AUTO: 4.46 X10E6/UL (ref 3.77–5.28)
SODIUM SERPL-SCNC: 143 MMOL/L (ref 134–144)
TRIGL SERPL-MCNC: 64 MG/DL (ref 0–149)
VLDLC SERPL CALC-MCNC: 13 MG/DL (ref 5–40)
WBC # BLD AUTO: 4.1 X10E3/UL (ref 3.4–10.8)

## 2020-08-04 ENCOUNTER — TELEPHONE (OUTPATIENT)
Dept: FAMILY MEDICINE CLINIC | Age: 75
End: 2020-08-04

## 2020-08-04 DIAGNOSIS — E78.2 MIXED HYPERLIPIDEMIA: ICD-10-CM

## 2020-08-04 RX ORDER — ROSUVASTATIN CALCIUM 10 MG/1
10 TABLET, COATED ORAL
Qty: 30 TAB | Refills: 5 | Status: SHIPPED | OUTPATIENT
Start: 2020-08-04 | End: 2020-10-30 | Stop reason: ALTCHOICE

## 2020-08-17 DIAGNOSIS — E11.9 TYPE 2 DIABETES MELLITUS WITHOUT COMPLICATION, WITHOUT LONG-TERM CURRENT USE OF INSULIN (HCC): Primary | ICD-10-CM

## 2020-08-17 RX ORDER — METFORMIN HYDROCHLORIDE 500 MG/1
500 TABLET ORAL 2 TIMES DAILY WITH MEALS
Qty: 180 TAB | Refills: 3 | Status: SHIPPED | OUTPATIENT
Start: 2020-08-17 | End: 2021-07-30

## 2020-08-17 NOTE — TELEPHONE ENCOUNTER
Patient wants to get the medication for metFORMIN (GLUCOPHAGE) 500 mg tablet .   If any questions please give her a call @ [phone]

## 2020-08-19 ENCOUNTER — TELEPHONE (OUTPATIENT)
Dept: FAMILY MEDICINE CLINIC | Age: 75
End: 2020-08-19

## 2020-08-19 NOTE — TELEPHONE ENCOUNTER
----- Message from Anne-Marieyudelka Bhakti sent at 8/19/2020  3:03 PM EDT -----  Regarding: Dr. 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 Fax number is [phone].

## 2020-08-19 NOTE — LETTER
8/21/2020 Ms. Jaclyn Ghosh [address] [phone] To Whom It May Concern: 
 
Jaclyn Ghosh is currently under the care of [facility].  
 
Mrs. Lelia Penn is considered to be in a high risk category in regards to the [address] pandemic. She has been advised to remain out of the work as a precautionary measure in accordance to the recommendations from the Center for Disease Control(CDC). If there are questions or concerns please have the patient contact our office. Sincerely, Jim Gordon MD

## 2020-08-20 NOTE — TELEPHONE ENCOUNTER
Patient phoned verified with two identifiers. Patient requesting a letter to employer stating unable to return back to work due to health issues. Please fax to [phone]. Informed patient will send message to provider voiced understanding.

## 2020-08-21 ENCOUNTER — TELEPHONE (OUTPATIENT)
Dept: FAMILY MEDICINE CLINIC | Age: 75
End: 2020-08-21

## 2020-09-09 ENCOUNTER — HOSPITAL ENCOUNTER (OUTPATIENT)
Dept: MRI IMAGING | Age: 75
Discharge: HOME | End: 2020-09-09
Attending: ORTHOPAEDIC SURGERY
Payer: MEDICARE

## 2020-09-09 DIAGNOSIS — M75.42 IMPINGEMENT SYNDROME OF LEFT SHOULDER: ICD-10-CM

## 2020-09-09 PROCEDURE — 73221 MRI JOINT UPR EXTREM W/O DYE: CPT

## 2020-09-11 ENCOUNTER — TELEPHONE (OUTPATIENT)
Dept: FAMILY MEDICINE CLINIC | Age: 75
End: 2020-09-11

## 2020-09-11 NOTE — TELEPHONE ENCOUNTER
----- Message from Beth Flores sent at 9/11/2020 12:33 PM EDT -----  Regarding: Dr. Everett Daley/Telephone  Caller's first and last name and relationship to patient (if not the patient): pt  Best contact number: [phone]  Preferred date and time: next available  Scheduled appointment date and time: N/A  Reason for appointment: pre-op  Details to clarify the request: Pt needs a pre op for surgery on 9/28/20 with Dr. Trinidad Leiva at [facility].

## 2020-09-18 ENCOUNTER — OFFICE VISIT (OUTPATIENT)
Dept: FAMILY MEDICINE CLINIC | Age: 75
End: 2020-09-18
Payer: MEDICARE

## 2020-09-18 VITALS
HEIGHT: 63 IN | WEIGHT: 176.8 LBS | RESPIRATION RATE: 18 BRPM | OXYGEN SATURATION: 99 % | TEMPERATURE: 97.3 F | BODY MASS INDEX: 31.33 KG/M2 | DIASTOLIC BLOOD PRESSURE: 84 MMHG | SYSTOLIC BLOOD PRESSURE: 134 MMHG | HEART RATE: 68 BPM

## 2020-09-18 DIAGNOSIS — E78.2 MIXED HYPERLIPIDEMIA: ICD-10-CM

## 2020-09-18 DIAGNOSIS — E11.9 TYPE 2 DIABETES MELLITUS WITHOUT COMPLICATION, WITHOUT LONG-TERM CURRENT USE OF INSULIN (HCC): ICD-10-CM

## 2020-09-18 DIAGNOSIS — M67.912 TENDINOPATHY OF LEFT ROTATOR CUFF: ICD-10-CM

## 2020-09-18 DIAGNOSIS — I10 ESSENTIAL HYPERTENSION: ICD-10-CM

## 2020-09-18 DIAGNOSIS — Z01.818 PRE-OP EXAMINATION: Primary | ICD-10-CM

## 2020-09-18 DIAGNOSIS — Z51.81 ENCOUNTER FOR MEDICATION MONITORING: ICD-10-CM

## 2020-09-18 DIAGNOSIS — E66.9 NON MORBID OBESITY: ICD-10-CM

## 2020-09-18 LAB
BILIRUB UR QL STRIP: NEGATIVE
GLUCOSE UR-MCNC: NEGATIVE MG/DL
KETONES P FAST UR STRIP-MCNC: NEGATIVE MG/DL
PH UR STRIP: 5.5 [PH] (ref 4.6–8)
PROT UR QL STRIP: NEGATIVE
SP GR UR STRIP: 1.02 (ref 1–1.03)
UA UROBILINOGEN AMB POC: NORMAL (ref 0.2–1)
URINALYSIS CLARITY POC: NORMAL
URINALYSIS COLOR POC: YELLOW
URINE BLOOD POC: NEGATIVE
URINE LEUKOCYTES POC: NORMAL
URINE NITRITES POC: NEGATIVE

## 2020-09-18 PROCEDURE — G8536 NO DOC ELDER MAL SCRN: HCPCS | Performed by: FAMILY MEDICINE

## 2020-09-18 PROCEDURE — G8399 PT W/DXA RESULTS DOCUMENT: HCPCS | Performed by: FAMILY MEDICINE

## 2020-09-18 PROCEDURE — G8427 DOCREV CUR MEDS BY ELIG CLIN: HCPCS | Performed by: FAMILY MEDICINE

## 2020-09-18 PROCEDURE — 81003 URINALYSIS AUTO W/O SCOPE: CPT | Performed by: FAMILY MEDICINE

## 2020-09-18 PROCEDURE — 99214 OFFICE O/P EST MOD 30 MIN: CPT | Performed by: FAMILY MEDICINE

## 2020-09-18 PROCEDURE — 36415 COLL VENOUS BLD VENIPUNCTURE: CPT | Performed by: FAMILY MEDICINE

## 2020-09-18 PROCEDURE — 1101F PT FALLS ASSESS-DOCD LE1/YR: CPT | Performed by: FAMILY MEDICINE

## 2020-09-18 PROCEDURE — 3044F HG A1C LEVEL LT 7.0%: CPT | Performed by: FAMILY MEDICINE

## 2020-09-18 PROCEDURE — 1090F PRES/ABSN URINE INCON ASSESS: CPT | Performed by: FAMILY MEDICINE

## 2020-09-18 PROCEDURE — G8417 CALC BMI ABV UP PARAM F/U: HCPCS | Performed by: FAMILY MEDICINE

## 2020-09-18 PROCEDURE — 2022F DILAT RTA XM EVC RTNOPTHY: CPT | Performed by: FAMILY MEDICINE

## 2020-09-18 PROCEDURE — G8754 DIAS BP LESS 90: HCPCS | Performed by: FAMILY MEDICINE

## 2020-09-18 PROCEDURE — G8752 SYS BP LESS 140: HCPCS | Performed by: FAMILY MEDICINE

## 2020-09-18 PROCEDURE — G9899 SCRN MAM PERF RSLTS DOC: HCPCS | Performed by: FAMILY MEDICINE

## 2020-09-18 PROCEDURE — 93000 ELECTROCARDIOGRAM COMPLETE: CPT | Performed by: FAMILY MEDICINE

## 2020-09-18 PROCEDURE — G8510 SCR DEP NEG, NO PLAN REQD: HCPCS | Performed by: FAMILY MEDICINE

## 2020-09-18 PROCEDURE — 3017F COLORECTAL CA SCREEN DOC REV: CPT | Performed by: FAMILY MEDICINE

## 2020-09-18 NOTE — PROGRESS NOTES
Chief Complaint   Patient presents with    Pre-op Exam     Pt getting pre-op for L rotatary cuff surgery on 9-28-20 at [facility].   1. Have you been to the ER, urgent care clinic since your last visit? Hospitalized since your last visit? No    2. Have you seen or consulted any other health care providers outside of the [address] [place] [address] since your last visit? Include any pap smears or colon screening.  No

## 2020-09-18 NOTE — PROGRESS NOTES
HISTORY OF PRESENT ILLNESS  Molly Mittal is a 77 y.o. female. HPI   Pre-op for left rotator cuff surgery. Left shoulder pain for several months. Has had a couple of falls injuring the shoulder along with everyday wear and tear of the shoulder. Pain is 5/10. HTN follow up:  Compliant w/ meds, low salt diet, and exercise. No home bp monitoring. No swelling, headache or dizziness. No chest pain, SOB, palpitations. Otherwise feeling well since the last visit. DM type II follow up:  Compliant w/ meds, diabetic diet, and exercise. Obtains home glucose monitoring averaging 100-140s. Checks BS BID on most days and prn. Pt does not have BS log at visit today. No Rf needed for today. Denies any tingling sensation, polyuria and polydipsia. No blurred vision. No significant weight changes. Feeling well since last OV. Hypercholesterolemia follow up:  Compliant w/ low fat, low cholesterol diet. Exercising on her treadmill at home. Tolerating the crestor. HM:  Mammogram 1/2020  Patient states she had an eye exam 3/22/2019 at [facility].   Colonoscopy 3/4/2016 by Dr. Susana Burr- repeat in 10 years    Patient Active Problem List   Diagnosis Code    Right knee DJD M17.11    Arthritis M19.90    Chronic pain G89.29    Hyperlipidemia E78.5    Hypovitaminosis D E55.9    Type 2 diabetes mellitus without complication ([facility] I04.2    Essential hypertension I10    Encounter for medication monitoring Z51.81       Current Outpatient Medications   Medication Sig Dispense Refill    metFORMIN (Glucophage) 500 mg tablet Take 1 Tab by mouth two (2) times daily (with meals). 180 Tab 3    rosuvastatin (CRESTOR) 10 mg tablet Take 1 Tab by mouth nightly. For cholesterol 30 Tab 5    etodolac (LODINE) 400 mg tablet Take  by mouth two (2) times daily (with meals).       amLODIPine (NORVASC) 5 mg tablet TAKE 2 TABLETS BY MOUTH DAILY 180 Tab 3    glucose blood VI test strips (True Metrix Glucose Test Strip) strip USE TO TEST BLOOD SUGAR TWICE DAILY, E11.9 100 Strip 11    losartan (COZAAR) 100 mg tablet TAKE 1 TABLET BY MOUTH DAILY 90 Tab 1    hydroCHLOROthiazide (HYDRODIURIL) 12.5 mg tablet TAKE 1 TABLET BY MOUTH DAILY 90 Tab 1    glucose blood VI test strips (True Metrix Glucose Test Strip) strip USE TO CHECK BLOOD SUGAR TWICE DAILY 100 Strip 0    fluticasone propionate (FLONASE) 50 mcg/actuation nasal spray SHAKE LIQUID AND USE 2 SPRAYS IN EACH NOSTRIL DAILY AS NEEDED FOR RHINITIS 1 Bottle 11    Blood-Glucose Meter (TRUE METRIX AIR GLUCOSE METER) misc Use to check blood sugar twice a day, E11.9 1 Each 0    Blood Glucose Control, Low (TRUE METRIX LEVEL 1) soln Use as directed. E11.9 1 Each 3    albuterol (PROVENTIL HFA, VENTOLIN HFA, PROAIR HFA) 90 mcg/actuation inhaler Take 2 Puffs by inhalation every four (4) hours as needed for Wheezing. 1 Inhaler 11    aspirin delayed-release 81 mg tablet Take 1 Tab by mouth daily. 30 Tab 6    loratadine (CLARITIN) 10 mg tablet Take 1 tablet by mouth daily as needed for Allergies. 30 tablet 11    Lancets (PRODIGY TWIST TOP LANCET) misc Use to check blood glucose level twice a day and PRN. 200 Each 3    alcohol swabs (BD SINGLE USE SWABS REGULAR) padm Use to cleanse finger prior to checking glucose level. 200 Each 3    Cholecalciferol, Vitamin D3, (VITAMIN D3) 5,000 unit Tab Take 1 Cap by mouth daily.  Omega-3-DHA-EPA-Fish Oil 1,000 (120-180) mg Cap Take 1 Cap by mouth two (2) times a week.          No Known Allergies    Past Medical History:   Diagnosis Date    Arthritis     knee    Cancer ([address] 2003    melanoma -right lower extremity    Chronic pain     left knee & left shoulder    Diabetes ([facility]     type 2    Hypertension     Nausea & vomiting     Screen for colon cancer 10/2/2014       Past Surgical History:   Procedure Laterality Date    HX BREAST BIOPSY Right     yrs ago    HX CATARACT REMOVAL  2010    bilateral eyes    HX CHOLECYSTECTOMY  HX HYSTERECTOMY  1990    fibroid    HX KNEE ARTHROSCOPY  1999    left knee replacement    HX ORTHOPAEDIC      age 40- right foot    HX ORTHOPAEDIC  9/11/2015    left wrist surgery    HX OTHER SURGICAL  2005    permanent teeth replacements    HX OTHER SURGICAL Right 2003    melonoma removed rt leg    HX WISDOM TEETH EXTRACTION      [place] COLONOSCOPY FLX DX W/COLLJ SPEC WHEN PFRMD  2007    date and md unknown       Family History   Problem Relation Age of Onset    No Known Problems Mother     No Known Problems Father     Thyroid Disease Sister        Social History     Tobacco Use    Smoking status: Never Smoker    Smokeless tobacco: Never Used   Substance Use Topics    Alcohol use: No     Alcohol/week: 0.0 standard drinks        Lab Results   Component Value Date/Time    WBC 4.1 07/31/2020 11:40 AM    HGB 13.7 07/31/2020 11:40 AM    Hemoglobin (POC) 15.3 12/27/2009 07:16 AM    HCT 41.3 07/31/2020 11:40 AM    Hematocrit (POC) 45 12/27/2009 07:16 AM    PLATELET 561 37/95/8429 11:40 AM    MCV 93 07/31/2020 11:40 AM     Lab Results   Component Value Date/Time    Cholesterol, total 217 (H) 07/31/2020 11:40 AM    HDL Cholesterol 73 07/31/2020 11:40 AM    LDL, calculated 131 (H) 07/31/2020 11:40 AM    Triglyceride 64 07/31/2020 11:40 AM    CHOL/HDL Ratio 4.3 12/27/2009 07:40 AM     Lab Results   Component Value Date/Time    TSH 2.220 09/05/2017 09:57 AM      Lab Results   Component Value Date/Time    Sodium 143 07/31/2020 11:40 AM    Potassium 4.4 07/31/2020 11:40 AM    Chloride 100 07/31/2020 11:40 AM    CO2 25 07/31/2020 11:40 AM    Anion gap 9 10/22/2016 12:46 PM    Glucose 128 (H) 07/31/2020 11:40 AM    BUN 16 07/31/2020 11:40 AM    Creatinine 0.57 07/31/2020 11:40 AM    BUN/Creatinine ratio 28 07/31/2020 11:40 AM    GFR est  07/31/2020 11:40 AM    GFR est non-AA 92 07/31/2020 11:40 AM    Calcium 10.0 07/31/2020 11:40 AM    Bilirubin, total 0.5 07/31/2020 11:40 AM    ALT (SGPT) 7 07/31/2020 11:40 AM Alk. phosphatase 66 07/31/2020 11:40 AM    Protein, total 7.3 07/31/2020 11:40 AM    Albumin 4.8 (H) 07/31/2020 11:40 AM    Globulin 4.2 (H) 10/22/2016 12:46 PM    A-G Ratio 1.9 07/31/2020 11:40 AM      Lab Results   Component Value Date/Time    Hemoglobin A1c 6.8 (H) 02/24/2014 09:00 AM    Hemoglobin A1c (POC) 6.8 07/31/2020 11:54 AM         Review of Systems   Constitutional: Negative for malaise/fatigue. HENT: Negative for congestion. Eyes: Negative for blurred vision. Respiratory: Negative for cough and shortness of breath. Cardiovascular: Negative for chest pain, palpitations and leg swelling. Gastrointestinal: Negative for abdominal pain, constipation and heartburn. Genitourinary: Negative for dysuria, frequency and urgency. Musculoskeletal: Negative for back pain. Neurological: Negative for dizziness, tingling and headaches. Endo/Heme/Allergies: Negative for environmental allergies. Psychiatric/Behavioral: Negative for depression. The patient does not have insomnia. Physical Exam  Vitals signs and nursing note reviewed. Constitutional:       Appearance: Normal appearance. She is well-developed. Comments: /84   Pulse 68   Temp 97.3 °F (36.3 °C) (Temporal)   Resp 18   Ht 5' 3\" (1.6 m)   Wt 176 lb 12.8 oz (80.2 kg)   LMP 05/10/1990   SpO2 99%   BMI 31.32 kg/m²      HENT:      Right Ear: Tympanic membrane and ear canal normal.      Left Ear: Tympanic membrane and ear canal normal.      Nose: No mucosal edema or rhinorrhea. Neck:      Musculoskeletal: Normal range of motion and neck supple. Thyroid: No thyromegaly. Cardiovascular:      Rate and Rhythm: Normal rate and regular rhythm. Heart sounds: Normal heart sounds. No gallop. Pulmonary:      Effort: Pulmonary effort is normal.      Breath sounds: Normal breath sounds. Abdominal:      General: Bowel sounds are normal.      Palpations: Abdomen is soft. There is no mass. Tenderness:  There is no abdominal tenderness. Musculoskeletal:         General: No swelling. Left shoulder: She exhibits decreased range of motion, tenderness, bony tenderness and pain. She exhibits no spasm, normal pulse and normal strength. Lymphadenopathy:      Cervical: No cervical adenopathy. Skin:     General: Skin is warm and dry. Neurological:      General: No focal deficit present. Mental Status: She is alert and oriented to person, place, and time. Psychiatric:         Mood and Affect: Mood normal.         ASSESSMENT and PLAN  Diagnoses and all orders for this visit:    1. Pre-op examination//  2. Tendinopathy of left rotator cuff  Medically stable for surgery. -     AMB POC EKG ROUTINE W/ 12 LEADS, INTER & REP  NSR, nonspecific t wave abnormality unchanged from prior tracing of 10/2026.      3. Essential hypertension  Discussed sodium restriction, high k rich diet, maintaining ideal body weight and regular exercise program such as daily walking 30 min perday 4-5 times per week, as physiologic means to achieve blood pressure control.  Medication compliance advised. 4. Type 2 diabetes mellitus without complication, without long-term current use of insulin (HCC)  -     HEMOGLOBIN A1C WITH EAG    5. Mixed hyperlipidemia  Continue to monitor. Work on diet and exercise. 6. Encounter for medication monitoring  -     METABOLIC PANEL, COMPREHENSIVE  -     CBC W/O DIFF  -     AMB POC URINALYSIS DIP STICK AUTO W/O MICRO    7. Non morbid obesity  I have reviewed/discussed the above normal BMI with the patient. I have recommended the following interventions: dietary management education, guidance, and counseling .        reviewed diet, exercise and weight control  cardiovascular risk and specific lipid/LDL goals reviewed  reviewed medications and side effects in detail  specific diabetic recommendations: low cholesterol diet, weight control and daily exercise discussed and glycohemoglobin and other lab monitoring discussed     I have discussed diagnosis listed in this note with pt and/or family. I have discussed treatment plans and options and the risk/benefit analysis of those options, including safe use of medications and possible medication side effects. Through the use of shared decision making we have agreed to the above plan. The patient has received an after-visit summary and questions were answered concerning future plans and follow up. Advise pt of any urgent changes then to proceed to the ER.

## 2020-09-19 LAB
ALBUMIN SERPL-MCNC: 4.5 G/DL (ref 3.7–4.7)
ALBUMIN/GLOB SERPL: 1.7 {RATIO} (ref 1.2–2.2)
ALP SERPL-CCNC: 59 IU/L (ref 39–117)
ALT SERPL-CCNC: 7 IU/L (ref 0–32)
AST SERPL-CCNC: 12 IU/L (ref 0–40)
BILIRUB SERPL-MCNC: 0.4 MG/DL (ref 0–1.2)
BUN SERPL-MCNC: 15 MG/DL (ref 8–27)
BUN/CREAT SERPL: 31 (ref 12–28)
CALCIUM SERPL-MCNC: 10 MG/DL (ref 8.7–10.3)
CHLORIDE SERPL-SCNC: 102 MMOL/L (ref 96–106)
CO2 SERPL-SCNC: 27 MMOL/L (ref 20–29)
CREAT SERPL-MCNC: 0.48 MG/DL (ref 0.57–1)
ERYTHROCYTE [DISTWIDTH] IN BLOOD BY AUTOMATED COUNT: 12.4 % (ref 11.7–15.4)
EST. AVERAGE GLUCOSE BLD GHB EST-MCNC: 148 MG/DL
GLOBULIN SER CALC-MCNC: 2.7 G/DL (ref 1.5–4.5)
GLUCOSE SERPL-MCNC: 112 MG/DL (ref 65–99)
HBA1C MFR BLD: 6.8 % (ref 4.8–5.6)
HCT VFR BLD AUTO: 40 % (ref 34–46.6)
HGB BLD-MCNC: 13.2 G/DL (ref 11.1–15.9)
MCH RBC QN AUTO: 30.1 PG (ref 26.6–33)
MCHC RBC AUTO-ENTMCNC: 33 G/DL (ref 31.5–35.7)
MCV RBC AUTO: 91 FL (ref 79–97)
PLATELET # BLD AUTO: 267 X10E3/UL (ref 150–450)
POTASSIUM SERPL-SCNC: 4.4 MMOL/L (ref 3.5–5.2)
PROT SERPL-MCNC: 7.2 G/DL (ref 6–8.5)
RBC # BLD AUTO: 4.38 X10E6/UL (ref 3.77–5.28)
SODIUM SERPL-SCNC: 142 MMOL/L (ref 134–144)
WBC # BLD AUTO: 4.9 X10E3/UL (ref 3.4–10.8)

## 2020-09-24 ENCOUNTER — TELEPHONE (OUTPATIENT)
Dept: FAMILY MEDICINE CLINIC | Age: 75
End: 2020-09-24

## 2020-09-24 NOTE — TELEPHONE ENCOUNTER
Jhonathan Hendricks (nurse) with [facility] states that they need patient EKG and labs that was done on Sept.18 she will be having surgery if they do not receive ekg and labs today Jayjayhank Crocker will  surgery she can be reached @ [phone] and [phone]

## 2020-10-15 RX ORDER — HYDROCHLOROTHIAZIDE 12.5 MG/1
TABLET ORAL
Qty: 90 TAB | Refills: 1 | Status: SHIPPED | OUTPATIENT
Start: 2020-10-15 | End: 2021-04-09

## 2020-10-15 RX ORDER — LOSARTAN POTASSIUM 100 MG/1
TABLET ORAL
Qty: 90 TAB | Refills: 1 | Status: SHIPPED | OUTPATIENT
Start: 2020-10-15 | End: 2021-04-09

## 2020-10-17 DIAGNOSIS — Z91.09 ENVIRONMENTAL ALLERGIES: ICD-10-CM

## 2020-10-19 RX ORDER — FLUTICASONE PROPIONATE 50 MCG
SPRAY, SUSPENSION (ML) NASAL
Qty: 16 G | Refills: 3 | Status: SHIPPED | OUTPATIENT
Start: 2020-10-19 | End: 2021-05-20

## 2020-10-30 ENCOUNTER — OFFICE VISIT (OUTPATIENT)
Dept: FAMILY MEDICINE CLINIC | Age: 75
End: 2020-10-30
Payer: MEDICARE

## 2020-10-30 VITALS
TEMPERATURE: 96.8 F | BODY MASS INDEX: 31.11 KG/M2 | RESPIRATION RATE: 18 BRPM | DIASTOLIC BLOOD PRESSURE: 89 MMHG | HEIGHT: 63 IN | WEIGHT: 175.6 LBS | SYSTOLIC BLOOD PRESSURE: 139 MMHG | HEART RATE: 74 BPM | OXYGEN SATURATION: 98 %

## 2020-10-30 DIAGNOSIS — I10 ESSENTIAL HYPERTENSION: Primary | ICD-10-CM

## 2020-10-30 DIAGNOSIS — E78.2 MIXED HYPERLIPIDEMIA: ICD-10-CM

## 2020-10-30 DIAGNOSIS — Z23 NEEDS FLU SHOT: ICD-10-CM

## 2020-10-30 DIAGNOSIS — E11.9 TYPE 2 DIABETES MELLITUS WITHOUT COMPLICATION, WITHOUT LONG-TERM CURRENT USE OF INSULIN (HCC): ICD-10-CM

## 2020-10-30 DIAGNOSIS — Z98.890 S/P LEFT ROTATOR CUFF REPAIR: ICD-10-CM

## 2020-10-30 DIAGNOSIS — E66.9 NON MORBID OBESITY: ICD-10-CM

## 2020-10-30 DIAGNOSIS — Z51.81 ENCOUNTER FOR MEDICATION MONITORING: ICD-10-CM

## 2020-10-30 PROCEDURE — G8754 DIAS BP LESS 90: HCPCS | Performed by: FAMILY MEDICINE

## 2020-10-30 PROCEDURE — 1090F PRES/ABSN URINE INCON ASSESS: CPT | Performed by: FAMILY MEDICINE

## 2020-10-30 PROCEDURE — G8417 CALC BMI ABV UP PARAM F/U: HCPCS | Performed by: FAMILY MEDICINE

## 2020-10-30 PROCEDURE — 3044F HG A1C LEVEL LT 7.0%: CPT | Performed by: FAMILY MEDICINE

## 2020-10-30 PROCEDURE — G8427 DOCREV CUR MEDS BY ELIG CLIN: HCPCS | Performed by: FAMILY MEDICINE

## 2020-10-30 PROCEDURE — G8752 SYS BP LESS 140: HCPCS | Performed by: FAMILY MEDICINE

## 2020-10-30 PROCEDURE — 1101F PT FALLS ASSESS-DOCD LE1/YR: CPT | Performed by: FAMILY MEDICINE

## 2020-10-30 PROCEDURE — 3017F COLORECTAL CA SCREEN DOC REV: CPT | Performed by: FAMILY MEDICINE

## 2020-10-30 PROCEDURE — G9899 SCRN MAM PERF RSLTS DOC: HCPCS | Performed by: FAMILY MEDICINE

## 2020-10-30 PROCEDURE — G8399 PT W/DXA RESULTS DOCUMENT: HCPCS | Performed by: FAMILY MEDICINE

## 2020-10-30 PROCEDURE — G8510 SCR DEP NEG, NO PLAN REQD: HCPCS | Performed by: FAMILY MEDICINE

## 2020-10-30 PROCEDURE — 99214 OFFICE O/P EST MOD 30 MIN: CPT | Performed by: FAMILY MEDICINE

## 2020-10-30 PROCEDURE — G8536 NO DOC ELDER MAL SCRN: HCPCS | Performed by: FAMILY MEDICINE

## 2020-10-30 PROCEDURE — G0008 ADMIN INFLUENZA VIRUS VAC: HCPCS

## 2020-10-30 PROCEDURE — 90694 VACC AIIV4 NO PRSRV 0.5ML IM: CPT

## 2020-10-30 PROCEDURE — 2022F DILAT RTA XM EVC RTNOPTHY: CPT | Performed by: FAMILY MEDICINE

## 2020-10-30 RX ORDER — ACETAMINOPHEN 500 MG
2000 TABLET ORAL DAILY
COMMUNITY

## 2020-10-30 RX ORDER — OXYCODONE HYDROCHLORIDE 5 MG/1
TABLET ORAL
COMMUNITY
Start: 2020-09-28 | End: 2020-10-30 | Stop reason: ALTCHOICE

## 2020-10-30 NOTE — PROGRESS NOTES
Chief Complaint   Patient presents with    Hypertension     follow up    Diabetes     follow up       Patient reports she is not taking Crestor due to causing muscle cramps. Mammogram 3/19/2020    Bone density 3/19/2020    Patient states she had an eye exam 3/22/2019 at [facility]. Patient states she will call to schedule an appt. Colonoscopy 3/4/2016 by Dr. Berenice Pickett- repeat in 10 years. 1. Have you been to the ER, urgent care clinic since your last visit? Hospitalized since your last visit? No    2. Have you seen or consulted any other health care providers outside of the [address] [place] [address] since your last visit? Include any pap smears or colon screening.  no

## 2020-10-30 NOTE — PROGRESS NOTES
HISTORY OF PRESENT ILLNESS  Sergey Jones is a 77 y.o. female. HPI   Follow up on chronic medical problems. Had rotaor cuff surgery on last month. Recovering well. Has follow up with ortho today. Then hoping to start PT. HTN follow up:  Compliant w/ meds, low salt diet, and exercise. No home bp monitoring. No swelling, headache or dizziness. No chest pain, SOB, palpitations. Otherwise feeling well since the last visit. DM type II follow up:  Compliant w/ meds, diabetic diet, and exercise. Obtains home glucose monitoring averaging 100-140s. Checks BS BID on most days and prn. Pt does not have BS log at visit today. No Rf needed for today. Denies any tingling sensation, polyuria and polydipsia. No blurred vision. No significant weight changes. Feeling well since last OV. Hypercholesterolemia follow up:  Compliant w/ low fat, low cholesterol diet. Exercising on her treadmill at home. Stopped the crestor d/t muscle and joint pain. HM:  Mammogram 1/2020  Patient states she had an eye exam 3/22/2019 at [facility].   Colonoscopy 3/4/2016 by Dr. Yuli Beltran- repeat in 10 years    Patient Active Problem List   Diagnosis Code    Right knee DJD M17.11    Arthritis M19.90    Chronic pain G89.29    Hyperlipidemia E78.5    Hypovitaminosis D E55.9    Type 2 [facility] mellitus [facility] Z91.8    Essential hypertension I10    Encounter for medication monitoring Z51.81       Current Outpatient Medications   Medication Sig Dispense Refill    fluticasone propionate (FLONASE) 50 mcg/actuation nasal spray SHAKE LIQUID AND USE 2 SPRAYS IN EACH NOSTRIL DAILY AS NEEDED FOR RHINITIS 16 g 3    hydroCHLOROthiazide (HYDRODIURIL) 12.5 mg tablet TAKE 1 TABLET BY MOUTH DAILY 90 Tab 1    losartan (COZAAR) 100 mg tablet TAKE 1 TABLET BY MOUTH DAILY 90 Tab 1    metFORMIN (Glucophage) 500 mg tablet Take 1 Tab by mouth two (2) times daily (with meals).  180 Tab 3    rosuvastatin (CRESTOR) 10 mg tablet Take 1 Tab by mouth nightly. For cholesterol 30 Tab 5    etodolac (LODINE) 400 mg tablet Take  by mouth two (2) times daily (with meals).  amLODIPine (NORVASC) 5 mg tablet TAKE 2 TABLETS BY MOUTH DAILY 180 Tab 3    glucose blood VI test strips (True Metrix Glucose Test Strip) strip USE TO TEST BLOOD SUGAR TWICE DAILY, E11.9 100 Strip 11    glucose blood VI test strips (True Metrix Glucose Test Strip) strip USE TO CHECK BLOOD SUGAR TWICE DAILY 100 Strip 0    Blood-Glucose Meter (TRUE METRIX AIR GLUCOSE METER) misc Use to check blood sugar twice a day, E11.9 1 Each 0    Blood Glucose Control, Low (TRUE METRIX LEVEL 1) soln Use as directed. E11.9 1 Each 3    albuterol (PROVENTIL HFA, VENTOLIN HFA, PROAIR HFA) 90 mcg/actuation inhaler Take 2 Puffs by inhalation every four (4) hours as needed for Wheezing. 1 Inhaler 11    aspirin delayed-release 81 mg tablet Take 1 Tab by mouth daily. 30 Tab 6    loratadine (CLARITIN) 10 mg tablet Take 1 tablet by mouth daily as needed for Allergies. 30 tablet 11    Lancets (PRODIGY TWIST TOP LANCET) misc Use to check blood glucose level twice a day and PRN. 200 Each 3    alcohol swabs (BD SINGLE USE SWABS REGULAR) padm Use to cleanse finger prior to checking glucose level. 200 Each 3    Cholecalciferol, Vitamin D3, (VITAMIN D3) 5,000 unit Tab Take 1 Cap by mouth daily.  Omega-3-DHA-EPA-Fish Oil 1,000 (120-180) mg Cap Take 1 Cap by mouth two (2) times a week.          No Known Allergies      Past Medical History:   Diagnosis Date    Arthritis     knee    Cancer ([address] 2003    melanoma -right lower extremity    Chronic pain     left knee & left shoulder    Diabetes ([address]     type 2    Hypertension     Nausea & vomiting     Screen for colon cancer 10/2/2014         Past Surgical History:   Procedure Laterality Date    HX BREAST BIOPSY Right     yrs ago    HX CATARACT REMOVAL  2010    bilateral eyes    HX CHOLECYSTECTOMY      HX HYSTERECTOMY  1990    fibroid    HX KNEE ARTHROSCOPY  1999    left knee replacement    HX ORTHOPAEDIC      age 40- right foot    HX ORTHOPAEDIC  9/11/2015    left wrist surgery    HX OTHER SURGICAL  2005    permanent teeth replacements    HX OTHER SURGICAL Right 2003    melonoma removed rt leg    HX WISDOM TEETH EXTRACTION      [place] COLONOSCOPY FLX DX W/COLLJ SPEC WHEN PFRMD  2007    date and md unknown         Family History   Problem Relation Age of Onset    No Known Problems Mother     No Known Problems Father     Thyroid Disease Sister        Social History     Tobacco Use    Smoking status: Never Smoker    Smokeless tobacco: Never Used   Substance Use Topics    Alcohol use: No     Alcohol/week: 0.0 standard drinks        Lab Results   Component Value Date/Time    WBC 4.9 09/18/2020 02:00 PM    HGB 13.2 09/18/2020 02:00 PM    Hemoglobin (POC) 15.3 12/27/2009 07:16 AM    HCT 40.0 09/18/2020 02:00 PM    Hematocrit (POC) 45 12/27/2009 07:16 AM    PLATELET 090 59/02/7655 02:00 PM    MCV 91 09/18/2020 02:00 PM     Lab Results   Component Value Date/Time    Cholesterol, total 217 (H) 07/31/2020 11:40 AM    HDL Cholesterol 73 07/31/2020 11:40 AM    LDL, calculated 131 (H) 07/31/2020 11:40 AM    Triglyceride 64 07/31/2020 11:40 AM    CHOL/HDL Ratio 4.3 12/27/2009 07:40 AM     Lab Results   Component Value Date/Time    TSH 2.220 09/05/2017 09:57 AM      Lab Results   Component Value Date/Time    Sodium 142 09/18/2020 02:00 PM    Potassium 4.4 09/18/2020 02:00 PM    Chloride 102 09/18/2020 02:00 PM    CO2 27 09/18/2020 02:00 PM    Anion gap 9 10/22/2016 12:46 PM    Glucose 112 (H) 09/18/2020 02:00 PM    BUN 15 09/18/2020 02:00 PM    Creatinine 0.48 (L) 09/18/2020 02:00 PM    BUN/Creatinine ratio 31 (H) 09/18/2020 02:00 PM    GFR est  09/18/2020 02:00 PM    GFR est non-AA 97 09/18/2020 02:00 PM    Calcium 10.0 09/18/2020 02:00 PM    Bilirubin, total 0.4 09/18/2020 02:00 PM    ALT (SGPT) 7 09/18/2020 02:00 PM    Alk. phosphatase 59 09/18/2020 02:00 PM    Protein, total 7.2 09/18/2020 02:00 PM    Albumin 4.5 09/18/2020 02:00 PM    Globulin 4.2 (H) 10/22/2016 12:46 PM    A-G Ratio 1.7 09/18/2020 02:00 PM      Lab Results   Component Value Date/Time    Hemoglobin A1c 6.8 (H) 09/18/2020 02:00 PM    Hemoglobin A1c (POC) 6.8 07/31/2020 11:54 AM         Review of Systems   Constitutional: Negative for malaise/fatigue. HENT: Negative for congestion. Eyes: Negative for blurred vision. Respiratory: Negative for cough and shortness of breath. Cardiovascular: Negative for chest pain, palpitations and leg swelling. Gastrointestinal: Negative for abdominal pain, constipation and heartburn. Genitourinary: Negative for dysuria, frequency and urgency. Musculoskeletal: Negative for back pain. Neurological: Negative for dizziness, tingling and headaches. Endo/Heme/Allergies: Negative for environmental allergies. Psychiatric/Behavioral: Negative for depression. The patient does not have insomnia. Physical Exam  Vitals signs and nursing note reviewed. Constitutional:       Appearance: Normal appearance. She is well-developed. Comments: /89 (BP 1 Location: Left arm, BP Patient Position: Sitting)   Pulse 74   Temp 96.8 °F (36 °C) (Temporal)   Resp 18   Ht 5' 3\" (1.6 m)   Wt 175 lb 9.6 oz (79.7 kg)   LMP 05/10/1990   SpO2 98%   BMI 31.11 kg/m²        HENT:      Right Ear: Tympanic membrane and ear canal normal.      Left Ear: Tympanic membrane and ear canal normal.      Nose: No mucosal edema or rhinorrhea. Neck:      Musculoskeletal: Normal range of motion and neck supple. Thyroid: No thyromegaly. Cardiovascular:      Rate and Rhythm: Normal rate and regular rhythm. Heart sounds: Normal heart sounds. No gallop. Pulmonary:      Effort: Pulmonary effort is normal.      Breath sounds: Normal breath sounds.    Abdominal:      General: Bowel sounds are normal.      Palpations: Abdomen is soft. There is no mass. Tenderness: There is no abdominal tenderness. Musculoskeletal: Normal range of motion. Right lower leg: No edema. Left lower leg: No edema. Comments: Left arm/shoulder in sling   Lymphadenopathy:      Cervical: No cervical adenopathy. Skin:     General: Skin is warm and dry. Neurological:      General: No focal deficit present. Mental Status: She is alert and oriented to person, place, and time. Psychiatric:         Mood and Affect: Mood normal.         ASSESSMENT and PLAN  Diagnoses and all orders for this visit:    1. Essential hypertension  Discussed sodium restriction, high k rich diet, maintaining ideal body weight and regular exercise program such as daily walking 30 min perday 4-5 times per week, as physiologic means to achieve blood pressure control.  Medication compliance advised. 2. Type 2 diabetes mellitus without complication, without long-term current use of insulin (HCC)  -     HEMOGLOBIN A1C WITH EAG; Future    3. Mixed hyperlipidemia  Monitor lipids off of medication. Pt does not want to go on another med for now. -     LIPID PANEL; Future    4. Encounter for medication monitoring  -     METABOLIC PANEL, COMPREHENSIVE; Future    5. Non morbid obesity  I have reviewed/discussed the above normal BMI with the patient. I have recommended the following interventions: dietary management education, guidance, and counseling . 6. S/P left rotator cuff repair  As per ortho      Follow-up and Dispositions    · Return in about 6 months (around 4/30/2021).        current treatment plan is effective, no change in therapy  reviewed diet, exercise and weight control  cardiovascular risk and specific lipid/LDL goals reviewed  reviewed medications and side effects in detail  specific diabetic recommendations: low cholesterol diet, weight control and daily exercise discussed, home glucose monitoring emphasized, foot care discussed and Podiatry visits discussed, annual eye examinations at Ophthalmology discussed and glycohemoglobin and other lab monitoring discussed     I have discussed diagnosis listed in this note with pt and/or family. I have discussed treatment plans and options and the risk/benefit analysis of those options, including safe use of medications and possible medication side effects. Through the use of shared decision making we have agreed to the above plan. The patient has received an after-visit summary and questions were answered concerning future plans and follow up. Advise pt of any urgent changes then to proceed to the ER.

## 2020-11-03 LAB
ALBUMIN SERPL-MCNC: 4.7 G/DL (ref 3.7–4.7)
ALBUMIN/GLOB SERPL: 1.9 {RATIO} (ref 1.2–2.2)
ALP SERPL-CCNC: 73 IU/L (ref 39–117)
ALT SERPL-CCNC: 6 IU/L (ref 0–32)
AST SERPL-CCNC: 9 IU/L (ref 0–40)
BILIRUB SERPL-MCNC: 0.5 MG/DL (ref 0–1.2)
BUN SERPL-MCNC: 9 MG/DL (ref 8–27)
BUN/CREAT SERPL: 15 (ref 12–28)
CALCIUM SERPL-MCNC: 10 MG/DL (ref 8.7–10.3)
CHLORIDE SERPL-SCNC: 104 MMOL/L (ref 96–106)
CHOLEST SERPL-MCNC: 249 MG/DL (ref 100–199)
CO2 SERPL-SCNC: 27 MMOL/L (ref 20–29)
CREAT SERPL-MCNC: 0.61 MG/DL (ref 0.57–1)
EST. AVERAGE GLUCOSE BLD GHB EST-MCNC: 140 MG/DL
GLOBULIN SER CALC-MCNC: 2.5 G/DL (ref 1.5–4.5)
GLUCOSE SERPL-MCNC: 172 MG/DL (ref 65–99)
HBA1C MFR BLD: 6.5 % (ref 4.8–5.6)
HDLC SERPL-MCNC: 75 MG/DL
INTERPRETATION, 910389: NORMAL
LDLC SERPL CALC-MCNC: 160 MG/DL (ref 0–99)
Lab: NORMAL
POTASSIUM SERPL-SCNC: 4.5 MMOL/L (ref 3.5–5.2)
PROT SERPL-MCNC: 7.2 G/DL (ref 6–8.5)
SODIUM SERPL-SCNC: 144 MMOL/L (ref 134–144)
TRIGL SERPL-MCNC: 84 MG/DL (ref 0–149)
VLDLC SERPL CALC-MCNC: 14 MG/DL (ref 5–40)

## 2020-12-29 RX ORDER — IBUPROFEN 800 MG/1
TABLET ORAL
Qty: 60 TAB | Refills: 1 | Status: SHIPPED | OUTPATIENT
Start: 2020-12-29 | End: 2021-02-08

## 2021-02-08 RX ORDER — IBUPROFEN 800 MG/1
TABLET ORAL
Qty: 60 TAB | Refills: 1 | Status: SHIPPED | OUTPATIENT
Start: 2021-02-08 | End: 2021-03-16

## 2021-03-02 ENCOUNTER — TRANSCRIBE ORDER (OUTPATIENT)
Dept: SCHEDULING | Age: 76
End: 2021-03-02

## 2021-03-02 DIAGNOSIS — Z12.31 VISIT FOR SCREENING MAMMOGRAM: Primary | ICD-10-CM

## 2021-03-16 RX ORDER — IBUPROFEN 800 MG/1
TABLET ORAL
Qty: 60 TAB | Refills: 1 | Status: SHIPPED | OUTPATIENT
Start: 2021-03-16 | End: 2021-04-22

## 2021-03-26 RX ORDER — AZITHROMYCIN 250 MG/1
TABLET, FILM COATED ORAL
Qty: 6 TAB | Refills: 0 | OUTPATIENT
Start: 2021-03-26 | End: 2021-03-31

## 2021-03-26 NOTE — TELEPHONE ENCOUNTER
Patient is calling regarding her sinus issue. She still has congestion, drainage, etc.  Please call @[phone].

## 2021-03-26 NOTE — TELEPHONE ENCOUNTER
Patient c/o drainage, congestion and facial pressure. She is requesting Zithromax. . Telephone number [phone]

## 2021-03-26 NOTE — TELEPHONE ENCOUNTER
Taking allergy pill and nasal spray for congestion, drainage, advised to use Mucinex over weekend and call Monday if not better, she agreed.

## 2021-04-10 DIAGNOSIS — I10 ESSENTIAL HYPERTENSION: ICD-10-CM

## 2021-04-12 RX ORDER — AMLODIPINE BESYLATE 5 MG/1
TABLET ORAL
Qty: 90 TAB | Refills: 3 | Status: SHIPPED | OUTPATIENT
Start: 2021-04-12 | End: 2021-09-29 | Stop reason: SDUPTHER

## 2021-04-22 RX ORDER — IBUPROFEN 800 MG/1
TABLET ORAL
Qty: 60 TAB | Refills: 1 | Status: SHIPPED | OUTPATIENT
Start: 2021-04-22 | End: 2021-06-01

## 2021-04-28 ENCOUNTER — OFFICE VISIT (OUTPATIENT)
Dept: FAMILY MEDICINE CLINIC | Age: 76
End: 2021-04-28
Payer: MEDICARE

## 2021-04-28 VITALS
DIASTOLIC BLOOD PRESSURE: 78 MMHG | HEIGHT: 63 IN | WEIGHT: 178.2 LBS | SYSTOLIC BLOOD PRESSURE: 136 MMHG | TEMPERATURE: 97.4 F | HEART RATE: 74 BPM | OXYGEN SATURATION: 99 % | RESPIRATION RATE: 12 BRPM | BODY MASS INDEX: 31.57 KG/M2

## 2021-04-28 DIAGNOSIS — I10 ESSENTIAL HYPERTENSION: Primary | ICD-10-CM

## 2021-04-28 DIAGNOSIS — E78.2 MIXED HYPERLIPIDEMIA: ICD-10-CM

## 2021-04-28 DIAGNOSIS — E11.9 TYPE 2 DIABETES MELLITUS WITHOUT COMPLICATION, WITHOUT LONG-TERM CURRENT USE OF INSULIN (HCC): ICD-10-CM

## 2021-04-28 DIAGNOSIS — E66.9 NON MORBID OBESITY: ICD-10-CM

## 2021-04-28 DIAGNOSIS — Z51.81 ENCOUNTER FOR MEDICATION MONITORING: ICD-10-CM

## 2021-04-28 LAB
ALBUMIN SERPL-MCNC: 4.3 G/DL (ref 3.5–5)
ALBUMIN/GLOB SERPL: 1.4 {RATIO} (ref 1.1–2.2)
ALP SERPL-CCNC: 73 U/L (ref 45–117)
ALT SERPL-CCNC: 16 U/L (ref 12–78)
ANION GAP SERPL CALC-SCNC: 6 MMOL/L (ref 5–15)
AST SERPL-CCNC: 9 U/L (ref 15–37)
BILIRUB SERPL-MCNC: 0.5 MG/DL (ref 0.2–1)
BILIRUB UR QL STRIP: NEGATIVE
BUN SERPL-MCNC: 17 MG/DL (ref 6–20)
BUN/CREAT SERPL: 31 (ref 12–20)
CALCIUM SERPL-MCNC: 10.1 MG/DL (ref 8.5–10.1)
CHLORIDE SERPL-SCNC: 105 MMOL/L (ref 97–108)
CHOLEST SERPL-MCNC: 235 MG/DL
CO2 SERPL-SCNC: 28 MMOL/L (ref 21–32)
CREAT SERPL-MCNC: 0.54 MG/DL (ref 0.55–1.02)
CREAT UR-MCNC: 47.2 MG/DL
GLOBULIN SER CALC-MCNC: 3.1 G/DL (ref 2–4)
GLUCOSE POC: 189 MG/DL
GLUCOSE SERPL-MCNC: 202 MG/DL (ref 65–100)
GLUCOSE UR-MCNC: NORMAL MG/DL
HBA1C MFR BLD HPLC: 6.9 %
HDLC SERPL-MCNC: 76 MG/DL
HDLC SERPL: 3.1 {RATIO} (ref 0–5)
KETONES P FAST UR STRIP-MCNC: NEGATIVE MG/DL
LDLC SERPL CALC-MCNC: 141.6 MG/DL (ref 0–100)
LIPID PROFILE,FLP: ABNORMAL
MICROALBUMIN UR-MCNC: 0.84 MG/DL
MICROALBUMIN/CREAT UR-RTO: 18 MG/G (ref 0–30)
PH UR STRIP: 5.5 [PH] (ref 4.6–8)
POTASSIUM SERPL-SCNC: 4.3 MMOL/L (ref 3.5–5.1)
PROT SERPL-MCNC: 7.4 G/DL (ref 6.4–8.2)
PROT UR QL STRIP: NEGATIVE
SODIUM SERPL-SCNC: 139 MMOL/L (ref 136–145)
SP GR UR STRIP: 1.02 (ref 1–1.03)
TRIGL SERPL-MCNC: 87 MG/DL (ref ?–150)
UA UROBILINOGEN AMB POC: NORMAL (ref 0.2–1)
URINALYSIS CLARITY POC: CLEAR
URINALYSIS COLOR POC: YELLOW
URINE BLOOD POC: NEGATIVE
URINE LEUKOCYTES POC: NEGATIVE
URINE NITRITES POC: NEGATIVE
VLDLC SERPL CALC-MCNC: 17.4 MG/DL

## 2021-04-28 PROCEDURE — 2022F DILAT RTA XM EVC RTNOPTHY: CPT | Performed by: FAMILY MEDICINE

## 2021-04-28 PROCEDURE — G8752 SYS BP LESS 140: HCPCS | Performed by: FAMILY MEDICINE

## 2021-04-28 PROCEDURE — G8754 DIAS BP LESS 90: HCPCS | Performed by: FAMILY MEDICINE

## 2021-04-28 PROCEDURE — 83036 HEMOGLOBIN GLYCOSYLATED A1C: CPT | Performed by: FAMILY MEDICINE

## 2021-04-28 PROCEDURE — G8417 CALC BMI ABV UP PARAM F/U: HCPCS | Performed by: FAMILY MEDICINE

## 2021-04-28 PROCEDURE — 99213 OFFICE O/P EST LOW 20 MIN: CPT | Performed by: FAMILY MEDICINE

## 2021-04-28 PROCEDURE — 81003 URINALYSIS AUTO W/O SCOPE: CPT | Performed by: FAMILY MEDICINE

## 2021-04-28 PROCEDURE — G8399 PT W/DXA RESULTS DOCUMENT: HCPCS | Performed by: FAMILY MEDICINE

## 2021-04-28 PROCEDURE — 3046F HEMOGLOBIN A1C LEVEL >9.0%: CPT | Performed by: FAMILY MEDICINE

## 2021-04-28 PROCEDURE — 3017F COLORECTAL CA SCREEN DOC REV: CPT | Performed by: FAMILY MEDICINE

## 2021-04-28 PROCEDURE — 1090F PRES/ABSN URINE INCON ASSESS: CPT | Performed by: FAMILY MEDICINE

## 2021-04-28 PROCEDURE — G8510 SCR DEP NEG, NO PLAN REQD: HCPCS | Performed by: FAMILY MEDICINE

## 2021-04-28 PROCEDURE — 82947 ASSAY GLUCOSE BLOOD QUANT: CPT | Performed by: FAMILY MEDICINE

## 2021-04-28 PROCEDURE — 1101F PT FALLS ASSESS-DOCD LE1/YR: CPT | Performed by: FAMILY MEDICINE

## 2021-04-28 PROCEDURE — G8536 NO DOC ELDER MAL SCRN: HCPCS | Performed by: FAMILY MEDICINE

## 2021-04-28 PROCEDURE — G8427 DOCREV CUR MEDS BY ELIG CLIN: HCPCS | Performed by: FAMILY MEDICINE

## 2021-04-28 NOTE — PROGRESS NOTES
HISTORY OF PRESENT ILLNESS  Afsaneh Thorne is a 77 y.o. female. HPI   Follow up on chronic medical problems. Doing the precautionary measures at home to reduce risks of exposure COVID19. Also wearing mask when she is going out. No known sick contacts or known exposure to [address]. HTN follow up:  Compliant w/ meds, low salt diet, and exercise. No home bp monitoring. No swelling, headache or dizziness. No chest pain, SOB, palpitations. Otherwise feeling well since the last visit. DM type II follow up:  Compliant w/ meds, diabetic diet, and exercise. Obtains home glucose monitoring averaging 100-140s. Checks BS BID on most days and prn. Pt does not have BS log at visit today. No Rf needed for today. Tingling sensation in the foot and toes, denies polyuria and polydipsia. No blurred vision. No significant weight changes. Hypercholesterolemia follow up:  Compliant w/ low fat, low cholesterol diet. Exercising on her treadmill at home. Stopped the crestor d/t muscle and joint pain.          HM:  Mammogram 1/2020  Patient states she had an eye exam 02/2021 at [facility].   Colonoscopy 3/4/2016 by Dr. Gupta Men- repeat in 10 years    Patient Active Problem List   Diagnosis Code    Right knee DJD M17.11    Arthritis M19.90    Chronic pain G89.29    Hyperlipidemia E78.5    Hypovitaminosis D E55.9    Type 2 diabetes mellitus without complication ([facility] [address] V83.6    Essential hypertension I10    Encounter for medication monitoring Z51.81       Current Outpatient Medications   Medication Sig Dispense Refill    ibuprofen (MOTRIN) 800 mg tablet TAKE 1 TABLET BY MOUTH THREE TIMES DAILY AS NEEDED FOR PAIN 60 Tab 1    amLODIPine (NORVASC) 5 mg tablet TAKE 1 TABLET BY MOUTH DAILY 90 Tab 3    hydroCHLOROthiazide (HYDRODIURIL) 12.5 mg tablet TAKE 1 TABLET BY MOUTH DAILY 90 Tab 3    losartan (COZAAR) 100 mg tablet TAKE 1 TABLET BY MOUTH DAILY 90 Tab 3    cholecalciferol (VITAMIN D3) (2,000 UNITS /50 MCG) cap capsule Take 4,000 Units by mouth daily.  fluticasone propionate (FLONASE) 50 mcg/actuation nasal spray SHAKE LIQUID AND USE 2 SPRAYS IN EACH NOSTRIL DAILY AS NEEDED FOR RHINITIS 16 g 3    metFORMIN (Glucophage) 500 mg tablet Take 1 Tab by mouth two (2) times daily (with meals). 180 Tab 3    glucose blood VI test strips (True Metrix Glucose Test Strip) strip USE TO CHECK BLOOD SUGAR TWICE DAILY 100 Strip 0    Blood-Glucose Meter (TRUE METRIX AIR GLUCOSE METER) misc Use to check blood sugar twice a day, E11.9 1 Each 0    Blood Glucose Control, Low (TRUE METRIX LEVEL 1) soln Use as directed. E11.9 1 Each 3    albuterol (PROVENTIL HFA, VENTOLIN HFA, PROAIR HFA) 90 mcg/actuation inhaler Take 2 Puffs by inhalation every four (4) hours as needed for Wheezing. 1 Inhaler 11    aspirin delayed-release 81 mg tablet Take 1 Tab by mouth daily. 30 Tab 6    loratadine (CLARITIN) 10 mg tablet Take 1 tablet by mouth daily as needed for Allergies. 30 tablet 11    Lancets (PRODIGY TWIST TOP LANCET) misc Use to check blood glucose level twice a day and PRN. 200 Each 3    alcohol swabs (BD SINGLE USE SWABS REGULAR) padm Use to cleanse finger prior to checking glucose level. 200 Each 3    Omega-3-DHA-EPA-Fish Oil 1,000 (120-180) mg Cap Take 1 Cap by mouth two (2) times a week.          No Known Allergies    Past Medical History:   Diagnosis Date    Arthritis     knee    Cancer ([address] 2003    melanoma -right lower extremity    Chronic pain     left knee & left shoulder    Diabetes ([address]     type 2    Hypertension     Nausea & vomiting     Screen for colon cancer 10/2/2014       Past Surgical History:   Procedure Laterality Date    HX BREAST BIOPSY Right     yrs ago    HX CATARACT REMOVAL  2010    bilateral eyes    HX CHOLECYSTECTOMY      HX HYSTERECTOMY  1990    fibroid    HX KNEE ARTHROSCOPY  1999    left knee replacement    HX ORTHOPAEDIC      age 40- right foot    HX ORTHOPAEDIC  9/11/2015 left wrist surgery    HX OTHER SURGICAL  2005    permanent teeth replacements    HX OTHER SURGICAL Right 2003    melonoma removed rt leg    HX ROTATOR CUFF REPAIR  09/28/2020    left    HX WISDOM TEETH EXTRACTION      [place] COLONOSCOPY FLX DX W/COLLJ SPEC WHEN PFRMD  2007    date and md unknown       Family History   Problem Relation Age of Onset    No Known Problems Mother     No Known Problems Father     Thyroid Disease Sister        Social History     Tobacco Use    Smoking status: Never Smoker    Smokeless tobacco: Never Used   Substance Use Topics    Alcohol use: No     Alcohol/week: 0.0 standard drinks        Lab Results   Component Value Date/Time    WBC 4.9 09/18/2020 02:00 PM    HGB 13.2 09/18/2020 02:00 PM    Hemoglobin (POC) 15.3 12/27/2009 07:16 AM    HCT 40.0 09/18/2020 02:00 PM    Hematocrit (POC) 45 12/27/2009 07:16 AM    PLATELET 988 66/25/7081 02:00 PM    MCV 91 09/18/2020 02:00 PM     Lab Results   Component Value Date/Time    Cholesterol, total 249 (H) 11/02/2020 07:43 AM    HDL Cholesterol 75 11/02/2020 07:43 AM    LDL, calculated 160 (H) 11/02/2020 07:43 AM    LDL, calculated 131 (H) 07/31/2020 11:40 AM    Triglyceride 84 11/02/2020 07:43 AM    CHOL/HDL Ratio 4.3 12/27/2009 07:40 AM     Lab Results   Component Value Date/Time    TSH 2.220 09/05/2017 09:57 AM      Lab Results   Component Value Date/Time    Sodium 144 11/02/2020 07:43 AM    Potassium 4.5 11/02/2020 07:43 AM    Chloride 104 11/02/2020 07:43 AM    CO2 27 11/02/2020 07:43 AM    Anion gap 9 10/22/2016 12:46 PM    Glucose 172 (H) 11/02/2020 07:43 AM    BUN 9 11/02/2020 07:43 AM    Creatinine 0.61 11/02/2020 07:43 AM    BUN/Creatinine ratio 15 11/02/2020 07:43 AM    GFR est  11/02/2020 07:43 AM    GFR est non-AA 90 11/02/2020 07:43 AM    Calcium 10.0 11/02/2020 07:43 AM    Bilirubin, total 0.5 11/02/2020 07:43 AM    ALT (SGPT) 6 11/02/2020 07:43 AM    Alk.  phosphatase 73 11/02/2020 07:43 AM    Protein, total 7.2 11/02/2020 07:43 AM    Albumin 4.7 11/02/2020 07:43 AM    Globulin 4.2 (H) 10/22/2016 12:46 PM    A-G Ratio 1.9 11/02/2020 07:43 AM      Lab Results   Component Value Date/Time    Hemoglobin A1c 6.5 (H) 11/02/2020 07:43 AM    Hemoglobin A1c (POC) 6.8 07/31/2020 11:54 AM         Review of Systems   Constitutional: Negative for malaise/fatigue. HENT: Negative for congestion. Eyes: Negative for blurred vision. Respiratory: Negative for cough and shortness of breath. Cardiovascular: Negative for chest pain, palpitations and leg swelling. Gastrointestinal: Negative for abdominal pain, constipation and heartburn. Genitourinary: Negative for dysuria, frequency and urgency. Musculoskeletal: Negative for back pain and joint pain. Neurological: Negative for dizziness, tingling and headaches. Endo/Heme/Allergies: Negative for environmental allergies. Psychiatric/Behavioral: Negative for depression. The patient does not have insomnia. Physical Exam  Vitals signs and nursing note reviewed. Constitutional:       Appearance: Normal appearance. She is well-developed. Comments: /78   Pulse 74   Temp 97.4 °F (36.3 °C) (Oral)   Resp 12   Ht 5' 3\" (1.6 m)   Wt 178 lb 3.2 oz (80.8 kg)   LMP 05/10/1990   SpO2 99%   BMI 31.57 kg/m²    HENT:      Right Ear: Tympanic membrane and ear canal normal.      Left Ear: Tympanic membrane and ear canal normal.      Nose: No mucosal edema. Neck:      Musculoskeletal: Normal range of motion and neck supple. Thyroid: No thyromegaly. Cardiovascular:      Rate and Rhythm: Normal rate and regular rhythm. Heart sounds: Normal heart sounds. No gallop. Pulmonary:      Effort: Pulmonary effort is normal.      Breath sounds: Normal breath sounds. Abdominal:      General: Bowel sounds are normal.      Palpations: Abdomen is soft. There is no mass. Tenderness: There is no abdominal tenderness. Musculoskeletal: Normal range of motion. Right lower leg: No edema. Left lower leg: No edema. Lymphadenopathy:      Cervical: No cervical adenopathy. Skin:     General: Skin is warm and dry. Neurological:      General: No focal deficit present. Mental Status: She is alert and oriented to person, place, and time. Psychiatric:         Mood and Affect: Mood normal.         ASSESSMENT and PLAN  Diagnoses and all orders for this visit:    1. Essential hypertension  Discussed sodium restriction, high k rich diet, maintaining ideal body weight and regular exercise program such as daily walking 30 min perday 4-5 times per week, as physiologic means to achieve blood pressure control. Medication compliance advised. 2. Type 2 diabetes mellitus without complication, without long-term current use of insulin (HCC)  -     MICROALBUMIN, UR, RAND W/ MICROALB/CREAT RATIO; Future  -     AMB POC HEMOGLOBIN A1C  -     AMB POC GLUCOSE, QUANTITATIVE, BLOOD  -     AMB POC URINALYSIS DIP STICK AUTO W/O MICRO    3. Mixed hyperlipidemia  -     LIPID PANEL; Future    4. Encounter for medication monitoring  -     METABOLIC PANEL, COMPREHENSIVE; Future    5. Non morbid obesity  I have reviewed/discussed the above normal BMI with the patient. I have recommended the following interventions: dietary management education, guidance, and counseling .          Follow-up and Dispositions    · Return in about 5 months (around 9/28/2021) for medicare wellness exam.       current treatment plan is effective, no change in therapy  reviewed diet, exercise and weight control  cardiovascular risk and specific lipid/LDL goals reviewed  reviewed medications and side effects in detail  specific diabetic recommendations: low cholesterol diet, weight control and daily exercise discussed, all medications, side effects and compliance discussed carefully, foot care discussed and Podiatry visits discussed, annual eye examinations at Ophthalmology discussed and glycohemoglobin and other lab monitoring discussed     I have discussed diagnosis listed in this note with pt and/or family. I have discussed treatment plans and options and the risk/benefit analysis of those options, including safe use of medications and possible medication side effects. Through the use of shared decision making we have agreed to the above plan. The patient has received an after-visit summary and questions were answered concerning future plans and follow up. Advise pt of any urgent changes then to proceed to the ER.

## 2021-04-28 NOTE — PROGRESS NOTES
Chief Complaint   Patient presents with    Hypertension    Diabetes    Cholesterol Problem       1. Have you been to the ER, urgent care clinic since your last visit? Hospitalized since your last visit? No    2. Have you seen or consulted any other health care providers outside of the [address] [place] [address] since your last visit? Include any pap smears or colon screening.  Yes, ortho     Pt going Friday to Dr. Anais Quispe at [facility] (orthopedic surgeon)     Health Maintenance Due   Topic Date Due    A1C test (Diabetic or Prediabetic)  05/02/2021

## 2021-04-29 ENCOUNTER — TELEPHONE (OUTPATIENT)
Dept: FAMILY MEDICINE CLINIC | Age: 76
End: 2021-04-29

## 2021-04-29 RX ORDER — ROSUVASTATIN CALCIUM 5 MG/1
5 TABLET, COATED ORAL
Qty: 30 TAB | Refills: 3 | Status: SHIPPED | OUTPATIENT
Start: 2021-04-29 | End: 2021-08-30

## 2021-05-19 ENCOUNTER — HOSPITAL ENCOUNTER (OUTPATIENT)
Dept: MAMMOGRAPHY | Age: 76
Discharge: HOME | End: 2021-05-19
Attending: FAMILY MEDICINE
Payer: MEDICARE

## 2021-05-19 DIAGNOSIS — Z12.31 VISIT FOR SCREENING MAMMOGRAM: ICD-10-CM

## 2021-05-19 PROCEDURE — 77067 SCR MAMMO BI INCL CAD: CPT

## 2021-05-20 DIAGNOSIS — Z91.09 ENVIRONMENTAL ALLERGIES: ICD-10-CM

## 2021-05-20 RX ORDER — FLUTICASONE PROPIONATE 50 MCG
SPRAY, SUSPENSION (ML) NASAL
Qty: 16 G | Refills: 3 | Status: SHIPPED | OUTPATIENT
Start: 2021-05-20 | End: 2021-09-13

## 2021-05-27 ENCOUNTER — TRANSCRIBE ORDER (OUTPATIENT)
Dept: SCHEDULING | Age: 76
End: 2021-05-27

## 2021-05-27 DIAGNOSIS — R92.8 MAMMOGRAM ABNORMAL: Primary | ICD-10-CM

## 2021-06-01 RX ORDER — IBUPROFEN 800 MG/1
TABLET ORAL
Qty: 60 TABLET | Refills: 1 | Status: SHIPPED | OUTPATIENT
Start: 2021-06-01 | End: 2021-07-12

## 2021-06-17 ENCOUNTER — TELEPHONE (OUTPATIENT)
Dept: FAMILY MEDICINE CLINIC | Age: 76
End: 2021-06-17

## 2021-06-17 ENCOUNTER — TRANSCRIBE ORDER (OUTPATIENT)
Dept: INTERNAL MEDICINE CLINIC | Age: 76
End: 2021-06-17

## 2021-06-17 ENCOUNTER — HOSPITAL ENCOUNTER (OUTPATIENT)
Dept: ULTRASOUND IMAGING | Age: 76
Discharge: HOME | End: 2021-06-17
Attending: FAMILY MEDICINE
Payer: MEDICARE

## 2021-06-17 ENCOUNTER — HOSPITAL ENCOUNTER (OUTPATIENT)
Dept: MAMMOGRAPHY | Age: 76
Discharge: HOME | End: 2021-06-17
Attending: FAMILY MEDICINE
Payer: MEDICARE

## 2021-06-17 DIAGNOSIS — R92.8 ABNORMALITY OF LEFT BREAST ON SCREENING MAMMOGRAM: ICD-10-CM

## 2021-06-17 DIAGNOSIS — R92.8 MAMMOGRAM ABNORMAL: ICD-10-CM

## 2021-06-17 DIAGNOSIS — R92.0 BREAST MICROCALCIFICATIONS: ICD-10-CM

## 2021-06-17 PROCEDURE — 77065 DX MAMMO INCL CAD UNI: CPT

## 2021-06-17 PROCEDURE — 76642 ULTRASOUND BREAST LIMITED: CPT

## 2021-06-17 NOTE — TELEPHONE ENCOUNTER
----- Message from Lazarus Bacca sent at 6/17/2021  1:23 PM EDT -----  Regarding: Abel Alvarez MD  General Message/Vendor Calls    Caller's first and last name: [facility]       Reason for call: Pt Biopsy Forms       Callback required yes/no and why: N/A      Best contact number(s): [phone]      Details to clarify the request: caller advised pt is needing a biopsy-requesting to have forms completed and include information regarding preferred surgeon and physician's signature; caller advised fax # [phone] is returning documents as failed; caller advised procedure does not have to be completed at [facility] and pt can chose surgeon.        Harman Sanders

## 2021-06-22 ENCOUNTER — OFFICE VISIT (OUTPATIENT)
Dept: FAMILY MEDICINE CLINIC | Age: 76
End: 2021-06-22
Payer: MEDICARE

## 2021-06-22 VITALS
DIASTOLIC BLOOD PRESSURE: 80 MMHG | TEMPERATURE: 98 F | BODY MASS INDEX: 31.36 KG/M2 | HEART RATE: 65 BPM | HEIGHT: 63 IN | WEIGHT: 177 LBS | RESPIRATION RATE: 18 BRPM | SYSTOLIC BLOOD PRESSURE: 157 MMHG | OXYGEN SATURATION: 98 %

## 2021-06-22 DIAGNOSIS — E11.9 TYPE 2 DIABETES MELLITUS WITHOUT COMPLICATION, WITHOUT LONG-TERM CURRENT USE OF INSULIN (HCC): ICD-10-CM

## 2021-06-22 DIAGNOSIS — E78.2 MIXED HYPERLIPIDEMIA: ICD-10-CM

## 2021-06-22 DIAGNOSIS — Z51.81 ENCOUNTER FOR MEDICATION MONITORING: ICD-10-CM

## 2021-06-22 DIAGNOSIS — I10 ESSENTIAL HYPERTENSION: Primary | ICD-10-CM

## 2021-06-22 LAB
ANION GAP SERPL CALC-SCNC: 6 MMOL/L (ref 5–15)
BUN SERPL-MCNC: 15 MG/DL (ref 6–20)
BUN/CREAT SERPL: 31 (ref 12–20)
CALCIUM SERPL-MCNC: 10 MG/DL (ref 8.5–10.1)
CHLORIDE SERPL-SCNC: 104 MMOL/L (ref 97–108)
CHOLEST SERPL-MCNC: 229 MG/DL
CO2 SERPL-SCNC: 28 MMOL/L (ref 21–32)
CREAT SERPL-MCNC: 0.49 MG/DL (ref 0.55–1.02)
ERYTHROCYTE [DISTWIDTH] IN BLOOD BY AUTOMATED COUNT: 12.4 % (ref 11.5–14.5)
GLUCOSE POC: 175 MG/DL
GLUCOSE SERPL-MCNC: 192 MG/DL (ref 65–100)
HBA1C MFR BLD HPLC: 7.1 %
HCT VFR BLD AUTO: 42.2 % (ref 35–47)
HDLC SERPL-MCNC: 72 MG/DL
HDLC SERPL: 3.2 {RATIO} (ref 0–5)
HGB BLD-MCNC: 14 G/DL (ref 11.5–16)
LDLC SERPL CALC-MCNC: 137.6 MG/DL (ref 0–100)
MCH RBC QN AUTO: 31.6 PG (ref 26–34)
MCHC RBC AUTO-ENTMCNC: 33.2 G/DL (ref 30–36.5)
MCV RBC AUTO: 95.3 FL (ref 80–99)
NRBC # BLD: 0 K/UL (ref 0–0.01)
NRBC BLD-RTO: 0 PER 100 WBC
PLATELET # BLD AUTO: 255 K/UL (ref 150–400)
PMV BLD AUTO: 11.3 FL (ref 8.9–12.9)
POTASSIUM SERPL-SCNC: 3.7 MMOL/L (ref 3.5–5.1)
RBC # BLD AUTO: 4.43 M/UL (ref 3.8–5.2)
SODIUM SERPL-SCNC: 138 MMOL/L (ref 136–145)
TRIGL SERPL-MCNC: 97 MG/DL (ref ?–150)
VLDLC SERPL CALC-MCNC: 19.4 MG/DL
WBC # BLD AUTO: 3.3 K/UL (ref 3.6–11)

## 2021-06-22 PROCEDURE — 1101F PT FALLS ASSESS-DOCD LE1/YR: CPT | Performed by: FAMILY MEDICINE

## 2021-06-22 PROCEDURE — 99214 OFFICE O/P EST MOD 30 MIN: CPT | Performed by: FAMILY MEDICINE

## 2021-06-22 PROCEDURE — G8427 DOCREV CUR MEDS BY ELIG CLIN: HCPCS | Performed by: FAMILY MEDICINE

## 2021-06-22 PROCEDURE — 1090F PRES/ABSN URINE INCON ASSESS: CPT | Performed by: FAMILY MEDICINE

## 2021-06-22 PROCEDURE — 3044F HG A1C LEVEL LT 7.0%: CPT | Performed by: FAMILY MEDICINE

## 2021-06-22 PROCEDURE — G8753 SYS BP > OR = 140: HCPCS | Performed by: FAMILY MEDICINE

## 2021-06-22 PROCEDURE — 83036 HEMOGLOBIN GLYCOSYLATED A1C: CPT | Performed by: FAMILY MEDICINE

## 2021-06-22 PROCEDURE — G8536 NO DOC ELDER MAL SCRN: HCPCS | Performed by: FAMILY MEDICINE

## 2021-06-22 PROCEDURE — G8754 DIAS BP LESS 90: HCPCS | Performed by: FAMILY MEDICINE

## 2021-06-22 PROCEDURE — 3017F COLORECTAL CA SCREEN DOC REV: CPT | Performed by: FAMILY MEDICINE

## 2021-06-22 PROCEDURE — G8417 CALC BMI ABV UP PARAM F/U: HCPCS | Performed by: FAMILY MEDICINE

## 2021-06-22 PROCEDURE — 2022F DILAT RTA XM EVC RTNOPTHY: CPT | Performed by: FAMILY MEDICINE

## 2021-06-22 PROCEDURE — G8510 SCR DEP NEG, NO PLAN REQD: HCPCS | Performed by: FAMILY MEDICINE

## 2021-06-22 PROCEDURE — 82947 ASSAY GLUCOSE BLOOD QUANT: CPT | Performed by: FAMILY MEDICINE

## 2021-06-22 PROCEDURE — G8399 PT W/DXA RESULTS DOCUMENT: HCPCS | Performed by: FAMILY MEDICINE

## 2021-06-22 NOTE — PROGRESS NOTES
Chief Complaint   Patient presents with    Hypertension     follow up    Diabetes     follow up       Mammogram 3/19/2020    Bone density 3/19/2020      Eye exam 12/17/2020    Colonoscopy 3/4/2016 by Dr. Soni Portillo- repeat in 10 years. 1. Have you been to the ER, urgent care clinic since your last visit? Hospitalized since your last visit? No    2. Have you seen or consulted any other health care providers outside of the [address] [place] [address] since your last visit? Include any pap smears or colon screening.  no

## 2021-06-22 NOTE — PROGRESS NOTES
Chief Complaint   Patient presents with    Hypertension     follow up    Diabetes     follow up       Mammogram 06/17/2021    Bone density 3/19/2020    Colonoscopy 3/4/2016 by Dr. Jocelin Apodaca- repeat in 10 years. 1. Have you been to the ER, urgent care clinic since your last visit? Hospitalized since your last visit? No    2. Have you seen or consulted any other health care providers outside of the [address] [place] [address] since your last visit? Include any pap smears or colon screening.  no

## 2021-06-22 NOTE — PROGRESS NOTES
HISTORY OF PRESENT ILLNESS  Vinicius Bueno is a 77 y.o. female. Follow up on chronic medical problems. Doing the precautionary measures at home to reduce risks of exposure COVID19. Also wearing mask when she is going out. No known sick contacts or known exposure to [address]. Going for breast bx on this Thursday for abnormal mammogram.    HTN follow up:  Compliant w/ meds, low salt diet, and exercise. No home bp monitoring. No swelling, headache or dizziness. No chest pain, SOB, palpitations. Otherwise feeling well since the last visit. DM type II follow up:  Compliant w/ meds, diabetic diet, and exercise. Obtains home glucose monitoring averaging 100-140s. Checks BS BID on most days and prn. Pt does not have BS log at visit today. No Rf needed for today. Tingling sensation in the foot and toes which comes and goes. Denies polyuria and polydipsia. No blurred vision. No significant weight changes. Hypercholesterolemia follow up:  Compliant w/ meds, low fat, low cholesterol diet. Tolerating the crestor. Exercising some. No muscle nor abdominal pain, no skin discoloration. Patient fasting today.   HM:  Mammogram 1/2020  Patient states she had an eye exam 02/2021 at [facility].   Colonoscopy 3/4/2016 by Dr. Jocelin Apodaca- repeat in 10 years    Patient Active Problem List   Diagnosis Code    Right knee DJD M17.11    Arthritis M19.90    Chronic pain G89.29    Hyperlipidemia E78.5    Hypovitaminosis D E55.9    Type 2 diabetes mellitus without complication ([facility] [address] P70.3    Essential hypertension I10    Encounter for medication monitoring Z51.81       Current Outpatient Medications   Medication Sig Dispense Refill    ibuprofen (MOTRIN) 800 mg tablet TAKE 1 TABLET BY MOUTH THREE TIMES DAILY AS NEEDED FOR PAIN 60 Tablet 1    fluticasone propionate (FLONASE) 50 mcg/actuation nasal spray SHAKE LIQUID AND USE 2 SPRAYS IN EACH NOSTRIL DAILY AS NEEDED FOR RHINITIS 16 g 3    rosuvastatin (CRESTOR) 5 mg tablet Take 1 Tab by mouth nightly. 30 Tab 3    amLODIPine (NORVASC) 5 mg tablet TAKE 1 TABLET BY MOUTH DAILY 90 Tab 3    hydroCHLOROthiazide (HYDRODIURIL) 12.5 mg tablet TAKE 1 TABLET BY MOUTH DAILY 90 Tab 3    losartan (COZAAR) 100 mg tablet TAKE 1 TABLET BY MOUTH DAILY 90 Tab 3    cholecalciferol (VITAMIN D3) (2,000 UNITS /50 MCG) cap capsule Take 4,000 Units by mouth daily.  metFORMIN (Glucophage) 500 mg tablet Take 1 Tab by mouth two (2) times daily (with meals). 180 Tab 3    glucose blood VI test strips (True Metrix Glucose Test Strip) strip USE TO CHECK BLOOD SUGAR TWICE DAILY 100 Strip 0    Blood-Glucose Meter (TRUE METRIX AIR GLUCOSE METER) misc Use to check blood sugar twice a day, E11.9 1 Each 0    Blood Glucose Control, Low (TRUE METRIX LEVEL 1) soln Use as directed. E11.9 1 Each 3    albuterol (PROVENTIL HFA, VENTOLIN HFA, PROAIR HFA) 90 mcg/actuation inhaler Take 2 Puffs by inhalation every four (4) hours as needed for Wheezing. 1 Inhaler 11    aspirin delayed-release 81 mg tablet Take 1 Tab by mouth daily. 30 Tab 6    loratadine (CLARITIN) 10 mg tablet Take 1 tablet by mouth daily as needed for Allergies. 30 tablet 11    Lancets (PRODIGY TWIST TOP LANCET) misc Use to check blood glucose level twice a day and PRN. 200 Each 3    alcohol swabs (BD SINGLE USE SWABS REGULAR) padm Use to cleanse finger prior to checking glucose level. 200 Each 3    Omega-3-DHA-EPA-Fish Oil 1,000 (120-180) mg Cap Take 1 Cap by mouth two (2) times a week.          No Known Allergies      Past Medical History:   Diagnosis Date    Arthritis     knee    Cancer ([facility] [address] 2003    melanoma -right lower extremity    Chronic pain     left knee & left shoulder    Diabetes ([facility] [address]     type 2    Hypertension     Nausea & vomiting     Screen for colon cancer 10/2/2014         Past Surgical History:   Procedure Laterality Date    HX BREAST BIOPSY Right     yrs ago    HX CATARACT REMOVAL  2010    bilateral eyes    HX CHOLECYSTECTOMY      HX HYSTERECTOMY  1990    fibroid    HX KNEE ARTHROSCOPY  1999    left knee replacement    HX ORTHOPAEDIC      age 40- right foot    HX ORTHOPAEDIC  9/11/2015    left wrist surgery    HX OTHER SURGICAL  2005    permanent teeth replacements    HX OTHER SURGICAL Right 2003    melonoma removed rt leg    HX ROTATOR CUFF REPAIR  09/28/2020    left    HX WISDOM TEETH EXTRACTION      [place] COLONOSCOPY FLX DX W/COLLJ SPEC WHEN PFRMD  2007    date and md unknown         Family History   Problem Relation Age of Onset    No Known Problems Mother     No Known Problems Father     Thyroid Disease Sister        Social History     Tobacco Use    Smoking status: Never Smoker    Smokeless tobacco: Never Used   Substance Use Topics    Alcohol use: No     Alcohol/week: 0.0 standard drinks        Lab Results   Component Value Date/Time    WBC 4.9 09/18/2020 02:00 PM    HGB 13.2 09/18/2020 02:00 PM    Hemoglobin (POC) 15.3 12/27/2009 07:16 AM    HCT 40.0 09/18/2020 02:00 PM    Hematocrit (POC) 45 12/27/2009 07:16 AM    PLATELET 519 79/07/2973 02:00 PM    MCV 91 09/18/2020 02:00 PM     Lab Results   Component Value Date/Time    Cholesterol, total 235 (H) 04/28/2021 11:56 AM    HDL Cholesterol 76 04/28/2021 11:56 AM    LDL, calculated 141.6 (H) 04/28/2021 11:56 AM    Triglyceride 87 04/28/2021 11:56 AM    CHOL/HDL Ratio 3.1 04/28/2021 11:56 AM     Lab Results   Component Value Date/Time    TSH 2.220 09/05/2017 09:57 AM      Lab Results   Component Value Date/Time    Sodium 139 04/28/2021 11:56 AM    Potassium 4.3 04/28/2021 11:56 AM    Chloride 105 04/28/2021 11:56 AM    CO2 28 04/28/2021 11:56 AM    Anion gap 6 04/28/2021 11:56 AM    Glucose 202 (H) 04/28/2021 11:56 AM    BUN 17 04/28/2021 11:56 AM    Creatinine 0.54 (L) 04/28/2021 11:56 AM    BUN/Creatinine ratio 31 (H) 04/28/2021 11:56 AM    GFR est AA >60 04/28/2021 11:56 AM    GFR est non-AA >60 04/28/2021 11:56 AM    Calcium 10.1 04/28/2021 11:56 AM    Bilirubin, total 0.5 04/28/2021 11:56 AM    ALT (SGPT) 16 04/28/2021 11:56 AM    Alk. phosphatase 73 04/28/2021 11:56 AM    Protein, total 7.4 04/28/2021 11:56 AM    Albumin 4.3 04/28/2021 11:56 AM    Globulin 3.1 04/28/2021 11:56 AM    A-G Ratio 1.4 04/28/2021 11:56 AM      Lab Results   Component Value Date/Time    Hemoglobin A1c 6.5 (H) 11/02/2020 07:43 AM    Hemoglobin A1c (POC) 6.9 04/28/2021 11:36 AM         Review of Systems   Constitutional: Negative for malaise/fatigue. HENT: Negative for congestion. Eyes: Negative for blurred vision. Respiratory: Negative for cough and shortness of breath. Cardiovascular: Negative for chest pain, palpitations and leg swelling. Gastrointestinal: Negative for abdominal pain, constipation and heartburn. Genitourinary: Negative for dysuria, frequency and urgency. Musculoskeletal: Negative for back pain and joint pain. Neurological: Negative for dizziness, tingling and headaches. Endo/Heme/Allergies: Negative for environmental allergies. Psychiatric/Behavioral: Negative for depression. The patient does not have insomnia. Physical Exam  Vitals and nursing note reviewed. Constitutional:       Appearance: Normal appearance. She is well-developed. Comments: BP (!) 157/80 (BP 1 Location: Left arm, BP Patient Position: Sitting)   Pulse 65   Temp 98 °F (36.7 °C) (Oral)   Resp 18   Ht 5' 3\" (1.6 m)   Wt 177 lb (80.3 kg)   LMP 05/10/1990   SpO2 98%   BMI 31.35 kg/m²      HENT:      Right Ear: Tympanic membrane and ear canal normal.      Left Ear: Tympanic membrane and ear canal normal.      Nose: No mucosal edema. Neck:      Thyroid: No thyromegaly. Cardiovascular:      Rate and Rhythm: Normal rate and regular rhythm. Heart sounds: Normal heart sounds. No gallop. Pulmonary:      Effort: Pulmonary effort is normal.      Breath sounds: Normal breath sounds.    Abdominal:      General: Bowel sounds are normal. Palpations: Abdomen is soft. There is no mass. Tenderness: There is no abdominal tenderness. Musculoskeletal:         General: Normal range of motion. Cervical back: Normal range of motion and neck supple. Right lower leg: No edema. Left lower leg: No edema. Lymphadenopathy:      Cervical: No cervical adenopathy. Skin:     General: Skin is warm and dry. Neurological:      General: No focal deficit present. Mental Status: She is alert and oriented to person, place, and time. Psychiatric:         Mood and Affect: Mood normal.         ASSESSMENT and PLAN  Diagnoses and all orders for this visit:    1. Essential hypertension  Admits to increase sodium intake in the diet. Will monitor closely. Recheck BP in 1 month. Discussed sodium restriction, high k rich diet, maintaining ideal body weight and regular exercise program such as daily walking 30 min perday 4-5 times per week, as physiologic means to achieve blood pressure control. Medication compliance advised. 2. Type 2 diabetes mellitus without complication, without long-term current use of insulin ([address]  Continue to monitor. Work on diet and exercise. -     AMB POC GLUCOSE, QUANTITATIVE, BLOOD  -     AMB POC HEMOGLOBIN A1C    3. Mixed hyperlipidemia  Continue to monitor. Work on diet and exercise.  -     LIPID PANEL; Future    4. Encounter for medication monitoring  -     METABOLIC PANEL, BASIC; Future  -     CBC W/O DIFF; Future      Follow-up and Dispositions    · Return in about 1 month (around 7/22/2021). reviewed diet, exercise and weight control  cardiovascular risk and specific lipid/LDL goals reviewed  reviewed medications and side effects in detail  specific diabetic recommendations: low cholesterol diet, weight control and daily exercise discussed and glycohemoglobin and other lab monitoring discussed      I have discussed diagnosis listed in this note with pt and/or family.  I have discussed treatment plans and options and the risk/benefit analysis of those options, including safe use of medications and possible medication side effects. Through the use of shared decision making we have agreed to the above plan. The patient has received an after-visit summary and questions were answered concerning future plans and follow up. Advise pt of any urgent changes then to proceed to the ER.

## 2021-06-24 ENCOUNTER — HOSPITAL ENCOUNTER (OUTPATIENT)
Dept: MAMMOGRAPHY | Age: 76
Discharge: HOME | End: 2021-06-24
Attending: FAMILY MEDICINE
Payer: MEDICARE

## 2021-06-24 DIAGNOSIS — R92.8 ABNORMAL MAMMOGRAM OF LEFT BREAST: ICD-10-CM

## 2021-06-24 DIAGNOSIS — R92.8 ABNORMAL MAMMOGRAM: ICD-10-CM

## 2021-06-24 PROCEDURE — 19081 BX BREAST 1ST LESION STRTCTC: CPT

## 2021-06-24 PROCEDURE — 77065 DX MAMMO INCL CAD UNI: CPT

## 2021-06-24 PROCEDURE — 88360 TUMOR IMMUNOHISTOCHEM/MANUAL: CPT

## 2021-06-24 PROCEDURE — 77030013689 HC GD NDL EVIVA HOLO -A

## 2021-06-24 PROCEDURE — 74011000250 HC RX REV CODE- 250: Performed by: RADIOLOGY

## 2021-06-24 PROCEDURE — 77030030538 HC HNDPC BIOP EVIVA HOLO -C

## 2021-06-24 PROCEDURE — A4648 IMPLANTABLE TISSUE MARKER: HCPCS

## 2021-06-24 PROCEDURE — 88305 TISSUE EXAM BY PATHOLOGIST: CPT

## 2021-06-24 PROCEDURE — 2709999900 HC NON-CHARGEABLE SUPPLY

## 2021-06-24 RX ORDER — LIDOCAINE HYDROCHLORIDE AND EPINEPHRINE 10; 10 MG/ML; UG/ML
8 INJECTION, SOLUTION INFILTRATION; PERINEURAL ONCE
Status: COMPLETED | OUTPATIENT
Start: 2021-06-24 | End: 2021-06-24

## 2021-06-24 RX ORDER — ROSUVASTATIN CALCIUM 10 MG/1
10 TABLET, COATED ORAL
Qty: 30 TABLET | Refills: 3 | Status: SHIPPED | OUTPATIENT
Start: 2021-06-24 | End: 2021-06-25 | Stop reason: SDUPTHER

## 2021-06-24 RX ORDER — SODIUM BICARBONATE 84 MG/ML
2.5 INJECTION, SOLUTION INTRAVENOUS
Status: COMPLETED | OUTPATIENT
Start: 2021-06-24 | End: 2021-06-24

## 2021-06-24 RX ORDER — LIDOCAINE HYDROCHLORIDE 10 MG/ML
12 INJECTION, SOLUTION EPIDURAL; INFILTRATION; INTRACAUDAL; PERINEURAL
Status: COMPLETED | OUTPATIENT
Start: 2021-06-24 | End: 2021-06-24

## 2021-06-24 RX ADMIN — LIDOCAINE HYDROCHLORIDE AND EPINEPHRINE 8 ML: 10; 10 INJECTION, SOLUTION INFILTRATION; PERINEURAL at 14:03

## 2021-06-24 RX ADMIN — LIDOCAINE HYDROCHLORIDE 12 ML: 10 INJECTION, SOLUTION EPIDURAL; INFILTRATION; INTRACAUDAL; PERINEURAL at 14:02

## 2021-06-24 RX ADMIN — SODIUM BICARBONATE 2.5 MEQ: 84 INJECTION, SOLUTION INTRAVENOUS at 14:05

## 2021-06-24 NOTE — PROGRESS NOTES
Discharge instructions given to patient by Rn. Pt verbalized understanding of discharge instructions. Pt discharged without difficulty. Pt. Discharged in stable condition via ambulation , accompanied by . Post procedure Mammo completed and reviewed by MD. Pt cleared for discharge. Biopsy site clean and dry, hemostasis achieved. Steri strips with DSD placed. Ice pack held by patient. Pt verbalized she would like to be notified by phone of any results. Pt encouraged to call department if she has not received her results tracie 3-5 business days. Pt advised not answer cell phone while driving. Biopsy Specimen verified with Susanne prior to RN carrying specimen to gross room.

## 2021-06-24 NOTE — DISCHARGE INSTRUCTIONS
[address]  [phone]      Breast Biopsy Discharge Instructions          1. After the biopsy, we will place a clean covered ice pack over the biopsy site, within the bra - you should leave the ice pack on 30 minutes and then remove the ice pack for 1-2 hours until bedtime. If needed you can continue applying ice the following day. It is a good idea to wear your bra for support, both day and night unless this causes you discomfort. 2. You may take Tylenol (two tablets) every 4 to 6 hours as needed for pain. Do not take aspirin or aspirin products (e.g. ibuprofen, Advil, Motrin) as these may cause more bleeding. 3. You may expect some bruising and skin discoloration in the biopsy area. This is normal and generally should resolve in 5 to 7 days. 4. Most women do not find it necessary to restrict their activities after the procedure. You should rest as needed on the day of your biopsy. The next day, if you are feeling okay, you may resume your regular work/activity schedule. Avoid strenuous activity and heavy lifting, jogging, aerobics, or vacuuming for 48 hours after the procedure. 5. 48 hours after your biopsy, remove the large outer dressing and leave the steri-strips (tiny pieces of tape) in place. The steri-strips will usually fall off in a few days. You may shower 48 hours after your biopsy and you may get the steri-strips wet. If still present after 4 days, you may gently peel the strips off. Keep the area clean and dry and shower daily. 6. If you have bleeding from the incision area, hold firm pressure on the area for 20 minutes. This should control any slight oozing that might occur.   If you develop persistent bleeding or pain which does not respond to the above measures or if you develop a fever, excessive swelling, redness, heat or drainage, please call the Breast Health Navigator at [phone] during normal business hours (7 a.m. - 5 p.m.). After business hours, call [phone] and ask for the on-call Radiology Nurse to be paged, or your referring physician. 7. You will receive your biopsy results (pathology report) in 3-5 business days - the radiologist will review your results and you will receive a call from the radiology department and/or from your doctor.           Physician : William Vasques    Nurse: Rusty Loges: ______________

## 2021-06-25 ENCOUNTER — TELEPHONE (OUTPATIENT)
Dept: FAMILY MEDICINE CLINIC | Age: 76
End: 2021-06-25

## 2021-06-25 RX ORDER — ROSUVASTATIN CALCIUM 10 MG/1
10 TABLET, COATED ORAL
Qty: 30 TABLET | Refills: 3 | Status: SHIPPED | OUTPATIENT
Start: 2021-06-25 | End: 2021-07-27 | Stop reason: ALTCHOICE

## 2021-06-28 ENCOUNTER — TELEPHONE (OUTPATIENT)
Dept: FAMILY MEDICINE CLINIC | Age: 76
End: 2021-06-28

## 2021-06-28 NOTE — TELEPHONE ENCOUNTER
----- Message from Kelle Silva sent at 6/25/2021  5:17 PM EDT -----  Regarding: Dr Wai Wall  Patient return call    Caller's first and last name and relationship (if not the patient):      Best contact number(s):[phone]      Whose call is being returned:  Dr Yesica Knapp    Details to clarify the request:pt did not listen to the message will listen to message      Kelle Silva

## 2021-07-07 ENCOUNTER — OFFICE VISIT (OUTPATIENT)
Dept: SURGERY | Age: 76
End: 2021-07-07
Payer: MEDICARE

## 2021-07-07 VITALS — BODY MASS INDEX: 31.01 KG/M2 | WEIGHT: 175 LBS | HEIGHT: 63 IN

## 2021-07-07 DIAGNOSIS — Z85.3 HISTORY OF LEFT BREAST CANCER: ICD-10-CM

## 2021-07-07 DIAGNOSIS — N64.89 HEMATOMA OF LEFT BREAST: ICD-10-CM

## 2021-07-07 DIAGNOSIS — D05.12 DUCTAL CARCINOMA IN SITU (DCIS) OF LEFT BREAST: Primary | ICD-10-CM

## 2021-07-07 PROCEDURE — 76642 ULTRASOUND BREAST LIMITED: CPT | Performed by: SURGERY

## 2021-07-07 PROCEDURE — 1101F PT FALLS ASSESS-DOCD LE1/YR: CPT | Performed by: SURGERY

## 2021-07-07 PROCEDURE — G8756 NO BP MEASURE DOC: HCPCS | Performed by: SURGERY

## 2021-07-07 PROCEDURE — G8432 DEP SCR NOT DOC, RNG: HCPCS | Performed by: SURGERY

## 2021-07-07 PROCEDURE — G8399 PT W/DXA RESULTS DOCUMENT: HCPCS | Performed by: SURGERY

## 2021-07-07 PROCEDURE — 1090F PRES/ABSN URINE INCON ASSESS: CPT | Performed by: SURGERY

## 2021-07-07 PROCEDURE — G8427 DOCREV CUR MEDS BY ELIG CLIN: HCPCS | Performed by: SURGERY

## 2021-07-07 PROCEDURE — G8417 CALC BMI ABV UP PARAM F/U: HCPCS | Performed by: SURGERY

## 2021-07-07 PROCEDURE — 3017F COLORECTAL CA SCREEN DOC REV: CPT | Performed by: SURGERY

## 2021-07-07 PROCEDURE — G8536 NO DOC ELDER MAL SCRN: HCPCS | Performed by: SURGERY

## 2021-07-07 PROCEDURE — 99205 OFFICE O/P NEW HI 60 MIN: CPT | Performed by: SURGERY

## 2021-07-07 NOTE — PROGRESS NOTES
HISTORY OF PRESENT ILLNESS  Shani Heath is a 77 y.o. female. HPI NEW patient consult referred by  Dr. Oralia Velasco for LEFT breast DCIS. Denies pain and has not noticed any changes to the breast area other than a hard knot where the biopsy was done. She did not notice any palpable changes in the breast before imaging. Family History:   None        Breast imaging-   [facility] Results (most recent):  Results from Hospital Encounter encounter on 06/24/21    [facility] POST BX IMAGING LT INCL CAD    Addendum 6/25/2021  1:41 PM  Addendum: ADDENDUM TO stereotactic left breast biopsy performed 6/24/2021. PATHOLOGY RESULTS: Ductal carcinoma in situ. RESULTS CONVEYED: Patient informed by Dr. Leonardo Roberts 6/25/2021. ASSESSMENT: These results are concordant with the imaging findings. RECOMMENDATIONS: Patient has been provided a surgical consultation appointment  with Dr. Idris Cho on July 11. Narrative  STUDY: STEREOTACTIC LEFT BREAST BIOPSY    INDICATION:  Calcifications. PROCEDURE:  The risks and benefits of the procedure were explained to the patient, questions  were answered, and informed consent was obtained. Calcifications in the 3:00 location of the left breast were localized using  stereotactic technique. The skin was prepped, and local anesthetic was applied. Multiple biopsy cores were obtained using 9 gauge  vacuum-assisted Eviva device  and a superior approach. Magnification radiography confirmed the presence of  calcifications in the specimens. A metallic clip was deployed at the biopsy  site. There were no immediate complications. Post-biopsy digital mammogram shows  the metallic clip is located proximal to the biopsy site, along the sampling  needle tract. Specimens were forwarded to Pathology for histologic evaluation. Impression  Left breast stereotactic biopsy performed for calcifications. Further recommendations will be based on final pathology results.           Review of Systems All other systems reviewed and are negative.       Physical Exam    ASSESSMENT and PLAN  {ASSESSMENT/PLAN:[id number]}

## 2021-07-07 NOTE — PROGRESS NOTES
HISTORY OF PRESENT ILLNESS  Mushtaq Addison is a 77 y.o. female. HPI  NEW patient consult referred by  Dr. Wendy Burns for LEFT breast DCIS. Denies pain and has not noticed any changes to the breast area other than a hard knot where the biopsy was done. She did not notice any palpable changes in the breast before imaging. 06/24/21: LEFT breast bx, 3:00. PATH: DCIS, 5mm largest single glass slide measurement, papillary and cribriform, nuclear grade 2 with focal necrosis, ER+(100%). Clinical stage 0. Family History:   None     Breast imaging-   [facility] Results (most recent):  Results from Hospital Encounter encounter on 06/24/21     [facility] POST BX IMAGING LT INCL CAD     Addendum 6/25/2021  1:41 PM  Addendum: ADDENDUM TO stereotactic left breast biopsy performed 6/24/2021.     PATHOLOGY RESULTS: Ductal carcinoma in situ.     RESULTS CONVEYED: Patient informed by Dr. Stacey Greene 6/25/2021.     ASSESSMENT: These results are concordant with the imaging findings.     RECOMMENDATIONS: Patient has been provided a surgical consultation appointment  with Dr. Prince Ruiz on July 11.     Narrative  STUDY: STEREOTACTIC LEFT BREAST BIOPSY     INDICATION:  Calcifications.     PROCEDURE:  The risks and benefits of the procedure were explained to the patient, questions  were answered, and informed consent was obtained.     Calcifications in the 3:00 location of the left breast were localized using  stereotactic technique. The skin was prepped, and local anesthetic was applied. Multiple biopsy cores were obtained using 9 gauge  vacuum-assisted Eviva device  and a superior approach. Magnification radiography confirmed the presence of  calcifications in the specimens. A metallic clip was deployed at the biopsy  site. There were no immediate complications. Post-biopsy digital mammogram shows  the metallic clip is located proximal to the biopsy site, along the sampling  needle tract.  Specimens were forwarded to Pathology for histologic evaluation.     Impression  Left breast stereotactic biopsy performed for calcifications. Further recommendations will be based on final pathology results.       Past Medical History:   Diagnosis Date    Arthritis     knee    Cancer ([facility] [address] 2003    melanoma -right lower extremity    Chronic pain     left knee & left shoulder    Diabetes ([facility] [address]     type 2    Hypertension     Nausea & vomiting     Screen for colon cancer 10/2/2014       Past Surgical History:   Procedure Laterality Date    HX BREAST BIOPSY Right     yrs ago    HX CATARACT REMOVAL  2010    bilateral eyes    HX CHOLECYSTECTOMY      HX HYSTERECTOMY  1990    fibroid    HX KNEE ARTHROSCOPY  1999    left knee replacement    HX ORTHOPAEDIC      age 40- right foot    HX ORTHOPAEDIC  9/11/2015    left wrist surgery    HX OTHER SURGICAL  2005    permanent teeth replacements    HX OTHER SURGICAL Right 2003    melonoma removed rt leg    HX ROTATOR CUFF REPAIR  09/28/2020    left    HX WISDOM TEETH EXTRACTION      LA COLONOSCOPY FLX DX W/COLLJ SPEC WHEN PFRMD  2007    date and md unknown       Social History     Socioeconomic History    Marital status:      Spouse name: Not on file    Number of children: Not on file    Years of education: Not on file    Highest education level: Not on file   Occupational History    Not on file   Tobacco Use    Smoking status: Never Smoker    Smokeless tobacco: Never Used   Vaping Use    Vaping Use: Never used   Substance and Sexual Activity    Alcohol use: No     Alcohol/week: 0.0 standard drinks    Drug use: No    Sexual activity: Yes     Partners: Male     Birth control/protection: None, Surgical   Other Topics Concern    Not on file   Social History Narrative    Not on file     Social Determinants of Health     Financial Resource Strain:     Difficulty of Paying Living Expenses:    Food Insecurity:     Worried About Running Out of Food in the Last Year:     Ran Out of Food in the Last Year:    Transportation Needs:     Lack of Transportation (Medical):  Lack of Transportation (Non-Medical):    Physical Activity:     Days of Exercise per Week:     Minutes of Exercise per Session:    Stress:     Feeling of Stress :    Social Connections:     Frequency of Communication with Friends and Family:     Frequency of Social Gatherings with Friends and Family:     Attends Druze Services:     Active Member of Clubs or Organizations:     Attends Club or Organization Meetings:     Marital Status:    Intimate Partner Violence:     Fear of Current or Ex-Partner:     Emotionally Abused:     Physically Abused:     Sexually Abused:        Current Outpatient Medications on File Prior to Visit   Medication Sig Dispense Refill    rosuvastatin (CRESTOR) 10 mg tablet Take 1 Tablet by mouth nightly. 30 Tablet 3    ibuprofen (MOTRIN) 800 mg tablet TAKE 1 TABLET BY MOUTH THREE TIMES DAILY AS NEEDED FOR PAIN 60 Tablet 1    fluticasone propionate (FLONASE) 50 mcg/actuation nasal spray SHAKE LIQUID AND USE 2 SPRAYS IN EACH NOSTRIL DAILY AS NEEDED FOR RHINITIS 16 g 3    rosuvastatin (CRESTOR) 5 mg tablet Take 1 Tab by mouth nightly. 30 Tab 3    amLODIPine (NORVASC) 5 mg tablet TAKE 1 TABLET BY MOUTH DAILY 90 Tab 3    hydroCHLOROthiazide (HYDRODIURIL) 12.5 mg tablet TAKE 1 TABLET BY MOUTH DAILY 90 Tab 3    losartan (COZAAR) 100 mg tablet TAKE 1 TABLET BY MOUTH DAILY 90 Tab 3    cholecalciferol (VITAMIN D3) (2,000 UNITS /50 MCG) cap capsule Take 4,000 Units by mouth daily.  metFORMIN (Glucophage) 500 mg tablet Take 1 Tab by mouth two (2) times daily (with meals). 180 Tab 3    glucose blood VI test strips (True Metrix Glucose Test Strip) strip USE TO CHECK BLOOD SUGAR TWICE DAILY 100 Strip 0    Blood-Glucose Meter (TRUE METRIX AIR GLUCOSE METER) misc Use to check blood sugar twice a day, E11.9 1 Each 0    Blood Glucose Control, Low (TRUE METRIX LEVEL 1) soln Use as directed. E11.9 1 Each 3    albuterol (PROVENTIL HFA, VENTOLIN HFA, PROAIR HFA) 90 mcg/actuation inhaler Take 2 Puffs by inhalation every four (4) hours as needed for Wheezing. 1 Inhaler 11    aspirin delayed-release 81 mg tablet Take 1 Tab by mouth daily. 30 Tab 6    loratadine (CLARITIN) 10 mg tablet Take 1 tablet by mouth daily as needed for Allergies. 30 tablet 11    Lancets (PRODIGY TWIST TOP LANCET) misc Use to check blood glucose level twice a day and PRN. 200 Each 3    alcohol swabs (BD SINGLE USE SWABS REGULAR) padm Use to cleanse finger prior to checking glucose level. 200 Each 3    Omega-3-DHA-EPA-Fish Oil 1,000 (120-180) mg Cap Take 1 Cap by mouth two (2) times a week. No current facility-administered medications on file prior to visit. No Known Allergies    OB History    No obstetric history on file. Obstetric Comments   Menarche 16, LMP unknown, # of children 0, age of 1st delivery N/A, Hysterectomy/oophorectomy No/No, Breast bx Yes, history of breast feeding No, BCP No, Hormone therapy No             Review of Systems   Constitutional: Negative. HENT: Negative. Eyes: Negative. Respiratory: Negative. Cardiovascular: Negative. Gastrointestinal: Negative. Genitourinary: Negative. Musculoskeletal: Negative. Skin: Negative. Neurological: Negative. Endo/Heme/Allergies: Negative. Psychiatric/Behavioral: Negative. Physical Exam  Exam conducted with a chaperone present. Cardiovascular:      Rate and Rhythm: Normal rate and regular rhythm. Heart sounds: Normal heart sounds. Pulmonary:      Breath sounds: Normal breath sounds. Chest:      Breasts: Breasts are symmetrical.         Right: Normal. No swelling, bleeding, inverted nipple, mass, nipple discharge, skin change or tenderness. Left: Normal. No swelling, bleeding, inverted nipple, mass, nipple discharge, skin change or tenderness.        Lymphadenopathy:      Cervical:      Right cervical: No superficial, deep or posterior cervical adenopathy. Left cervical: No superficial, deep or posterior cervical adenopathy. Upper Body:      Right upper body: No supraclavicular or axillary adenopathy. Left upper body: No supraclavicular or axillary adenopathy. BREAST ULTRASOUND, Pre-op Planning  Indication: Pre-op planning, LEFT breast  Technique: The area was scanned using a high-frequency linear-array near-field transducer  Findings: Bx clip above bx site at 12:00, with moderate sized hematoma  Impression: Breast cancer  Disposition: Surgery      ASSESSMENT and PLAN    ICD-10-CM ICD-9-CM    1. Ductal carcinoma in situ (DCIS) of left breast  D05.12 233.0    2. Hematoma of left breast  N64.89 611.89    3. History of left breast cancer  Z85.3 V10.3       Pt presents for consultation for treatment of LEFT breast DCIS, ER+, clinical stage 0. Moderate sized post-bx hematoma noted on LEFT breast exam. LEFT breast US visualizes bx clip above bx site at 12:00, with moderate sized hematoma. We had a long discussion of options for treatment. The patient was accompanied by her  during this encounter, and over half the time was spent on counseling and coordination of care. We discussed in depth the pathology results, and the need for surgery. The goals of treatment are to treat the breast, ensure that there is no invasive component, and to reduce risk of recurrence. Discussed treatment options with risks, complications, benefits, and limitations, including lumpectomy with possible XRT, and mastectomy with optional reconstruction, both having the same cure rate. Discussed lumpectomy as an outpatient operation with wire loc or Magseed loc, and explained the importance of negative margins, and the need for re-excision in the case of positive margins. Will plan to mobilize breast tissue during lumpectomy to minimize cosmetic defect.  Due to presence of hematoma, I advise postponing lumpectomy in order to minimize surgery. We also discussed the pts questions and concerns. Pt may continue her normal activities and gardening while awaiting surgery. Will let hematoma resorb for 4 weeks, and bring pt back to repeat US. This plan was reviewed with the patient and patient agrees. All questions were answered. Total time spent was 60 minutes.     Written by Chester Cogan, as dictated by Dr. Kandis Cottrell MD.

## 2021-07-07 NOTE — LETTER
7/9/2021 10:14 AM    Patient:  Mariaelena Pabon   YOB: 1945  Date of Visit: 7/7/2021      Dear Dr. Magdiel Mustafa: Thank you for referring Ms. Blair Herrera to me for evaluation/treatment. Below are the relevant portions of my assessment and plan of care. If you have questions, please do not hesitate to call me. I look forward to following Ms. Darcie Smith along with you.         Sincerely,      Shilpi Young MD

## 2021-07-12 RX ORDER — IBUPROFEN 800 MG/1
TABLET ORAL
Qty: 60 TABLET | Refills: 1 | Status: SHIPPED | OUTPATIENT
Start: 2021-07-12 | End: 2021-07-27 | Stop reason: ALTCHOICE

## 2021-07-20 DIAGNOSIS — E11.9 TYPE 2 DIABETES MELLITUS WITHOUT COMPLICATION, WITHOUT LONG-TERM CURRENT USE OF INSULIN (HCC): ICD-10-CM

## 2021-07-20 RX ORDER — CALCIUM CITRATE/VITAMIN D3 200MG-6.25
TABLET ORAL
Qty: 200 STRIP | Refills: 5 | Status: SHIPPED | OUTPATIENT
Start: 2021-07-20 | End: 2022-10-05

## 2021-07-27 ENCOUNTER — OFFICE VISIT (OUTPATIENT)
Dept: FAMILY MEDICINE CLINIC | Age: 76
End: 2021-07-27
Payer: MEDICARE

## 2021-07-27 VITALS
HEART RATE: 67 BPM | RESPIRATION RATE: 18 BRPM | HEIGHT: 63 IN | BODY MASS INDEX: 31.15 KG/M2 | TEMPERATURE: 98 F | DIASTOLIC BLOOD PRESSURE: 84 MMHG | WEIGHT: 175.8 LBS | SYSTOLIC BLOOD PRESSURE: 136 MMHG | OXYGEN SATURATION: 99 %

## 2021-07-27 DIAGNOSIS — E78.2 MIXED HYPERLIPIDEMIA: ICD-10-CM

## 2021-07-27 DIAGNOSIS — R92.0 BREAST MICROCALCIFICATIONS: ICD-10-CM

## 2021-07-27 DIAGNOSIS — I10 ESSENTIAL HYPERTENSION: Primary | ICD-10-CM

## 2021-07-27 DIAGNOSIS — Z51.81 ENCOUNTER FOR MEDICATION MONITORING: ICD-10-CM

## 2021-07-27 DIAGNOSIS — E11.9 TYPE 2 DIABETES MELLITUS WITHOUT COMPLICATION, WITHOUT LONG-TERM CURRENT USE OF INSULIN (HCC): ICD-10-CM

## 2021-07-27 PROCEDURE — G8536 NO DOC ELDER MAL SCRN: HCPCS | Performed by: FAMILY MEDICINE

## 2021-07-27 PROCEDURE — G8417 CALC BMI ABV UP PARAM F/U: HCPCS | Performed by: FAMILY MEDICINE

## 2021-07-27 PROCEDURE — G8752 SYS BP LESS 140: HCPCS | Performed by: FAMILY MEDICINE

## 2021-07-27 PROCEDURE — 1101F PT FALLS ASSESS-DOCD LE1/YR: CPT | Performed by: FAMILY MEDICINE

## 2021-07-27 PROCEDURE — 3017F COLORECTAL CA SCREEN DOC REV: CPT | Performed by: FAMILY MEDICINE

## 2021-07-27 PROCEDURE — 2022F DILAT RTA XM EVC RTNOPTHY: CPT | Performed by: FAMILY MEDICINE

## 2021-07-27 PROCEDURE — G8399 PT W/DXA RESULTS DOCUMENT: HCPCS | Performed by: FAMILY MEDICINE

## 2021-07-27 PROCEDURE — 1090F PRES/ABSN URINE INCON ASSESS: CPT | Performed by: FAMILY MEDICINE

## 2021-07-27 PROCEDURE — 3051F HG A1C>EQUAL 7.0%<8.0%: CPT | Performed by: FAMILY MEDICINE

## 2021-07-27 PROCEDURE — 99213 OFFICE O/P EST LOW 20 MIN: CPT | Performed by: FAMILY MEDICINE

## 2021-07-27 PROCEDURE — G8427 DOCREV CUR MEDS BY ELIG CLIN: HCPCS | Performed by: FAMILY MEDICINE

## 2021-07-27 PROCEDURE — G8510 SCR DEP NEG, NO PLAN REQD: HCPCS | Performed by: FAMILY MEDICINE

## 2021-07-27 PROCEDURE — G8754 DIAS BP LESS 90: HCPCS | Performed by: FAMILY MEDICINE

## 2021-07-27 NOTE — PROGRESS NOTES
HISTORY OF PRESENT ILLNESS  Win Rao is a 77 y.o. female. HPI   1 month follow up on BP. Overall feeling well. She has picked up her walking for exercise. Has follow up with breast surgery next week to discuss plans for her surgery. Overall she is feeling great. HTN follow up:  Compliant w/ meds, low salt diet, and exercise. No home bp monitoring. No swelling, headache or dizziness. No chest pain, SOB, palpitations. Otherwise feeling well since the last visit. DM type II follow up:  Compliant w/ meds, diabetic diet, and exercise. Last a1c was up to 7.1%. Obtains home glucose monitoring averaging 100-140s. Checks BS BID on most days and prn. Pt does not have BS log at visit today. No Rf needed for today. Tingling sensation in the foot and toes which comes and goes. Denies polyuria and polydipsia. No blurred vision. No significant weight changes. Hypercholesterolemia follow up:  Compliant w/ meds, low fat, low cholesterol diet. Tolerating the crestor. Exercising some. No muscle nor abdominal pain, no skin discoloration. Patient fasting today.   HM:  Mammogram 1/2020  Patient states she had an eye exam 02/2021 at [facility].   Colonoscopy 3/4/2016 by Dr. Sherri Lundberg- sania in 10 years    Patient Active Problem List   Diagnosis Code    Right knee DJD M17.11    Arthritis M19.90    Chronic pain G89.29    Hyperlipidemia E78.5    Hypovitaminosis D E55.9    Type 2 diabetes mellitus without complication ([facility] [address] C56.7    Essential hypertension I10    Encounter for medication monitoring Z51.81    Ductal carcinoma in situ (DCIS) of left breast D05.12       Current Outpatient Medications   Medication Sig Dispense Refill    glucose blood VI test strips (True Metrix Glucose Test Strip) strip USE TO TEST BLOOD SUGAR TWICE DAILY 200 Strip 5    fluticasone propionate (FLONASE) 50 mcg/actuation nasal spray SHAKE LIQUID AND USE 2 SPRAYS IN EACH NOSTRIL DAILY AS NEEDED FOR RHINITIS 16 g 3    rosuvastatin (CRESTOR) 5 mg tablet Take 1 Tab by mouth nightly. 30 Tab 3    amLODIPine (NORVASC) 5 mg tablet TAKE 1 TABLET BY MOUTH DAILY 90 Tab 3    hydroCHLOROthiazide (HYDRODIURIL) 12.5 mg tablet TAKE 1 TABLET BY MOUTH DAILY 90 Tab 3    losartan (COZAAR) 100 mg tablet TAKE 1 TABLET BY MOUTH DAILY 90 Tab 3    cholecalciferol (VITAMIN D3) (2,000 UNITS /50 MCG) cap capsule Take 2,000 Units by mouth daily.  metFORMIN (Glucophage) 500 mg tablet Take 1 Tab by mouth two (2) times daily (with meals). 180 Tab 3    Blood-Glucose Meter (TRUE METRIX AIR GLUCOSE METER) misc Use to check blood sugar twice a day, E11.9 1 Each 0    Blood Glucose Control, Low (TRUE METRIX LEVEL 1) soln Use as directed. E11.9 1 Each 3    albuterol (PROVENTIL HFA, VENTOLIN HFA, PROAIR HFA) 90 mcg/actuation inhaler Take 2 Puffs by inhalation every four (4) hours as needed for Wheezing. 1 Inhaler 11    aspirin delayed-release 81 mg tablet Take 1 Tab by mouth daily. 30 Tab 6    loratadine (CLARITIN) 10 mg tablet Take 1 tablet by mouth daily as needed for Allergies. 30 tablet 11    Lancets (PRODIGY TWIST TOP LANCET) misc Use to check blood glucose level twice a day and PRN. 200 Each 3    alcohol swabs (BD SINGLE USE SWABS REGULAR) padm Use to cleanse finger prior to checking glucose level. 200 Each 3    Omega-3-DHA-EPA-Fish Oil 1,000 (120-180) mg Cap Take 1 Cap by mouth two (2) times a week.          No Known Allergies    Past Medical History:   Diagnosis Date    Arthritis     knee    Cancer ([facility] [address] 2003    melanoma -right lower extremity    Chronic pain     left knee & left shoulder    Diabetes ([facility] [address]     type 2    Hypertension     Nausea & vomiting     Screen for colon cancer 10/2/2014       Past Surgical History:   Procedure Laterality Date    HX BREAST BIOPSY Right     yrs ago    HX BREAST BIOPSY Left 06/2021    HX CATARACT REMOVAL  2010    bilateral eyes    HX CHOLECYSTECTOMY      HX HYSTERECTOMY  1990 fibroid    HX KNEE ARTHROSCOPY  1999    left knee replacement    HX ORTHOPAEDIC      age 40- right foot    HX ORTHOPAEDIC  9/11/2015    left wrist surgery    HX OTHER SURGICAL  2005    permanent teeth replacements    HX OTHER SURGICAL Right 2003    melonoma removed rt leg    HX ROTATOR CUFF REPAIR  09/28/2020    left    HX WISDOM TEETH EXTRACTION      FL COLONOSCOPY FLX DX W/COLLJ SPEC WHEN PFRMD  2007    date and md unknown       Family History   Problem Relation Age of Onset    No Known Problems Mother     No Known Problems Father     Thyroid Disease Sister        Social History     Tobacco Use    Smoking status: Never Smoker    Smokeless tobacco: Never Used   Substance Use Topics    Alcohol use: No     Alcohol/week: 0.0 standard drinks        Lab Results   Component Value Date/Time    WBC 3.3 (L) 06/22/2021 12:38 PM    HGB 14.0 06/22/2021 12:38 PM    Hemoglobin (POC) 15.3 12/27/2009 07:16 AM    HCT 42.2 06/22/2021 12:38 PM    Hematocrit (POC) 45 12/27/2009 07:16 AM    PLATELET 997 33/82/1727 12:38 PM    MCV 95.3 06/22/2021 12:38 PM     Lab Results   Component Value Date/Time    Cholesterol, total 229 (H) 06/22/2021 12:38 PM    HDL Cholesterol 72 06/22/2021 12:38 PM    LDL, calculated 137.6 (H) 06/22/2021 12:38 PM    Triglyceride 97 06/22/2021 12:38 PM    CHOL/HDL Ratio 3.2 06/22/2021 12:38 PM     Lab Results   Component Value Date/Time    TSH 2.220 09/05/2017 09:57 AM      Lab Results   Component Value Date/Time    Sodium 138 06/22/2021 12:38 PM    Potassium 3.7 06/22/2021 12:38 PM    Chloride 104 06/22/2021 12:38 PM    CO2 28 06/22/2021 12:38 PM    Anion gap 6 06/22/2021 12:38 PM    Glucose 192 (H) 06/22/2021 12:38 PM    BUN 15 06/22/2021 12:38 PM    Creatinine 0.49 (L) 06/22/2021 12:38 PM    BUN/Creatinine ratio 31 (H) 06/22/2021 12:38 PM    GFR est AA >60 06/22/2021 12:38 PM    GFR est non-AA >60 06/22/2021 12:38 PM    Calcium 10.0 06/22/2021 12:38 PM    Bilirubin, total 0.5 04/28/2021 11:56 AM    ALT (SGPT) 16 04/28/2021 11:56 AM    Alk. phosphatase 73 04/28/2021 11:56 AM    Protein, total 7.4 04/28/2021 11:56 AM    Albumin 4.3 04/28/2021 11:56 AM    Globulin 3.1 04/28/2021 11:56 AM    A-G Ratio 1.4 04/28/2021 11:56 AM      Lab Results   Component Value Date/Time    Hemoglobin A1c 6.5 (H) 11/02/2020 07:43 AM    Hemoglobin A1c (POC) 7.1 06/22/2021 12:13 PM         Review of Systems   Constitutional: Negative for malaise/fatigue. HENT: Negative for congestion. Eyes: Negative for blurred vision. Respiratory: Negative for cough and shortness of breath. Cardiovascular: Negative for chest pain, palpitations and leg swelling. Gastrointestinal: Negative for abdominal pain, constipation and heartburn. Genitourinary: Negative for dysuria, frequency and urgency. Musculoskeletal: Negative for back pain and joint pain. Neurological: Negative for dizziness, tingling and headaches. Endo/Heme/Allergies: Negative for environmental allergies. Psychiatric/Behavioral: Negative for depression. The patient does not have insomnia. Physical Exam  Vitals and nursing note reviewed. Constitutional:       Appearance: Normal appearance. She is well-developed. Comments: /84   Pulse 67   Temp 98 °F (36.7 °C) (Oral)   Resp 18   Ht 5' 3\" (1.6 m)   Wt 175 lb 12.8 oz (79.7 kg)   LMP 05/10/1990   SpO2 99%   BMI 31.14 kg/m²    HENT:      Right Ear: Tympanic membrane and ear canal normal.      Left Ear: Tympanic membrane and ear canal normal.      Nose: No mucosal edema. Neck:      Thyroid: No thyromegaly. Cardiovascular:      Rate and Rhythm: Normal rate and regular rhythm. Heart sounds: Normal heart sounds. No gallop. Pulmonary:      Effort: Pulmonary effort is normal.      Breath sounds: Normal breath sounds. Abdominal:      General: Bowel sounds are normal.      Palpations: Abdomen is soft. There is no mass. Tenderness: There is no abdominal tenderness. Musculoskeletal:         General: Normal range of motion. Cervical back: Normal range of motion and neck supple. Right lower leg: No edema. Left lower leg: No edema. Lymphadenopathy:      Cervical: No cervical adenopathy. Skin:     General: Skin is warm and dry. Neurological:      General: No focal deficit present. Mental Status: She is alert and oriented to person, place, and time. Psychiatric:         Mood and Affect: Mood normal.         ASSESSMENT and PLAN  Diagnoses and all orders for this visit:    1. Essential hypertension  Improved BP. Discussed sodium restriction, high k rich diet, maintaining ideal body weight and regular exercise program such as daily walking 30 min perday 4-5 times per week, as physiologic means to achieve blood pressure control. Medication compliance advised. 2. Type 2 diabetes mellitus without complication, without long-term current use of insulin (HCC)  Discussed diet. Continue with walking exercise. 3. Mixed hyperlipidemia  Continue to monitor. Work on diet and exercise. Cut back the crestor to 5 mg nightly since the higher dose cause muscle cramping. 4. Encounter for medication monitoring    5. Breast microcalcifications  As per surgery. Follow-up and Dispositions    · Return in about 3 months (around 10/27/2021). reviewed diet, exercise and weight control  cardiovascular risk and specific lipid/LDL goals reviewed  reviewed medications and side effects in detail  specific diabetic recommendations: low cholesterol diet, weight control and daily exercise discussed and glycohemoglobin and other lab monitoring discussed     I have discussed diagnosis listed in this note with pt and/or family. I have discussed treatment plans and options and the risk/benefit analysis of those options, including safe use of medications and possible medication side effects.    Through the use of shared decision making we have agreed to the above plan. The patient has received an after-visit summary and questions were answered concerning future plans and follow up. Advise pt of any urgent changes then to proceed to the ER.

## 2021-07-27 NOTE — PROGRESS NOTES
Chief Complaint   Patient presents with    Hypertension     1 month follow up         1. Have you been to the ER, urgent care clinic since your last visit? Hospitalized since your last visit? No    2. Have you seen or consulted any other health care providers outside of the [address] [place] [address] since your last visit? Include any pap smears or colon screening.  no

## 2021-07-30 DIAGNOSIS — E11.9 TYPE 2 DIABETES MELLITUS WITHOUT COMPLICATION, WITHOUT LONG-TERM CURRENT USE OF INSULIN (HCC): ICD-10-CM

## 2021-07-30 RX ORDER — METFORMIN HYDROCHLORIDE 500 MG/1
TABLET ORAL
Qty: 180 TABLET | Refills: 3 | Status: SHIPPED | OUTPATIENT
Start: 2021-07-30 | End: 2021-08-24 | Stop reason: SDUPTHER

## 2021-08-04 ENCOUNTER — OFFICE VISIT (OUTPATIENT)
Dept: SURGERY | Age: 76
End: 2021-08-04
Payer: MEDICARE

## 2021-08-04 VITALS — WEIGHT: 175 LBS | BODY MASS INDEX: 31.01 KG/M2 | HEIGHT: 63 IN

## 2021-08-04 DIAGNOSIS — D05.12 DUCTAL CARCINOMA IN SITU (DCIS) OF LEFT BREAST: Primary | ICD-10-CM

## 2021-08-04 DIAGNOSIS — Z85.3 HISTORY OF LEFT BREAST CANCER: ICD-10-CM

## 2021-08-04 PROCEDURE — 99213 OFFICE O/P EST LOW 20 MIN: CPT | Performed by: SURGERY

## 2021-08-04 PROCEDURE — G8399 PT W/DXA RESULTS DOCUMENT: HCPCS | Performed by: SURGERY

## 2021-08-04 PROCEDURE — 1101F PT FALLS ASSESS-DOCD LE1/YR: CPT | Performed by: SURGERY

## 2021-08-04 PROCEDURE — G8427 DOCREV CUR MEDS BY ELIG CLIN: HCPCS | Performed by: SURGERY

## 2021-08-04 PROCEDURE — G8417 CALC BMI ABV UP PARAM F/U: HCPCS | Performed by: SURGERY

## 2021-08-04 PROCEDURE — 1090F PRES/ABSN URINE INCON ASSESS: CPT | Performed by: SURGERY

## 2021-08-04 PROCEDURE — G8432 DEP SCR NOT DOC, RNG: HCPCS | Performed by: SURGERY

## 2021-08-04 PROCEDURE — 76642 ULTRASOUND BREAST LIMITED: CPT | Performed by: SURGERY

## 2021-08-04 PROCEDURE — G8536 NO DOC ELDER MAL SCRN: HCPCS | Performed by: SURGERY

## 2021-08-04 PROCEDURE — G8756 NO BP MEASURE DOC: HCPCS | Performed by: SURGERY

## 2021-08-04 PROCEDURE — 3017F COLORECTAL CA SCREEN DOC REV: CPT | Performed by: SURGERY

## 2021-08-04 NOTE — PROGRESS NOTES
HISTORY OF PRESENT ILLNESS  Romy Darby is a 77 y.o. female. HPI  ESTABLISHED patient here to follow up on LEFT breast DCIS and hematoma. She has not been having pain and feels like the area is smaller than the last visit.    06/24/21: LEFT breast bx, 3:00. PATH: DCIS, 5mm largest single glass slide measurement, papillary and cribriform, nuclear grade 2 with focal necrosis, ER+(100%). Clinical stage 0.       Past Medical History:   Diagnosis Date    Arthritis     knee    Cancer ([facility] [address] 2003    melanoma -right lower extremity    Chronic pain     left knee & left shoulder    Diabetes ([facility] [address]     type 2    Hypertension     Nausea & vomiting     Screen for colon cancer 10/2/2014       Past Surgical History:   Procedure Laterality Date    HX BREAST BIOPSY Right     yrs ago    HX BREAST BIOPSY Left 06/2021    HX CATARACT REMOVAL  2010    bilateral eyes    HX CHOLECYSTECTOMY      HX HYSTERECTOMY  1990    fibroid    HX KNEE ARTHROSCOPY  1999    left knee replacement    HX ORTHOPAEDIC      age 40- right foot    HX ORTHOPAEDIC  9/11/2015    left wrist surgery    HX OTHER SURGICAL  2005    permanent teeth replacements    HX OTHER SURGICAL Right 2003    melonoma removed rt leg    HX ROTATOR CUFF REPAIR  09/28/2020    left    HX WISDOM TEETH EXTRACTION      ME COLONOSCOPY FLX DX W/COLLJ SPEC WHEN PFRMD  2007    date and md unknown       Social History     Socioeconomic History    Marital status:      Spouse name: Not on file    Number of children: Not on file    Years of education: Not on file    Highest education level: Not on file   Occupational History    Not on file   Tobacco Use    Smoking status: Never Smoker    Smokeless tobacco: Never Used   Vaping Use    Vaping Use: Never used   Substance and Sexual Activity    Alcohol use: No     Alcohol/week: 0.0 standard drinks    Drug use: Yes     Types: Marijuana     Comment: edibles-every 2 weeks    Sexual activity: Yes     Partners: Male Birth control/protection: None, Surgical   Other Topics Concern    Not on file   Social History Narrative    Not on file     Social Determinants of Health     Financial Resource Strain:     Difficulty of Paying Living Expenses:    Food Insecurity:     Worried About Running Out of Food in the Last Year:     [address] in the Last Year:    Transportation Needs:     Lack of Transportation (Medical):  Lack of Transportation (Non-Medical):    Physical Activity:     Days of Exercise per Week:     Minutes of Exercise per Session:    Stress:     Feeling of Stress :    Social Connections:     Frequency of Communication with Friends and Family:     Frequency of Social Gatherings with Friends and Family:     Attends Spiritism Services:     Active Member of Clubs or Organizations:     Attends Club or Organization Meetings:     Marital Status:    Intimate Partner Violence:     Fear of Current or Ex-Partner:     Emotionally Abused:     Physically Abused:     Sexually Abused:        Current Outpatient Medications on File Prior to Visit   Medication Sig Dispense Refill    metFORMIN (GLUCOPHAGE) 500 mg tablet TAKE 1 TABLET BY MOUTH TWICE DAILY WITH MEALS 180 Tablet 3    glucose blood VI test strips (True Metrix Glucose Test Strip) strip USE TO TEST BLOOD SUGAR TWICE DAILY 200 Strip 5    fluticasone propionate (FLONASE) 50 mcg/actuation nasal spray SHAKE LIQUID AND USE 2 SPRAYS IN EACH NOSTRIL DAILY AS NEEDED FOR RHINITIS 16 g 3    rosuvastatin (CRESTOR) 5 mg tablet Take 1 Tab by mouth nightly. 30 Tab 3    amLODIPine (NORVASC) 5 mg tablet TAKE 1 TABLET BY MOUTH DAILY 90 Tab 3    hydroCHLOROthiazide (HYDRODIURIL) 12.5 mg tablet TAKE 1 TABLET BY MOUTH DAILY 90 Tab 3    losartan (COZAAR) 100 mg tablet TAKE 1 TABLET BY MOUTH DAILY 90 Tab 3    cholecalciferol (VITAMIN D3) (2,000 UNITS /50 MCG) cap capsule Take 2,000 Units by mouth daily.       Blood-Glucose Meter (TRUE METRIX AIR GLUCOSE METER) misc Use to check blood sugar twice a day, E11.9 1 Each 0    Blood Glucose Control, Low (TRUE METRIX LEVEL 1) soln Use as directed. E11.9 1 Each 3    albuterol (PROVENTIL HFA, VENTOLIN HFA, PROAIR HFA) 90 mcg/actuation inhaler Take 2 Puffs by inhalation every four (4) hours as needed for Wheezing. 1 Inhaler 11    aspirin delayed-release 81 mg tablet Take 1 Tab by mouth daily. 30 Tab 6    loratadine (CLARITIN) 10 mg tablet Take 1 tablet by mouth daily as needed for Allergies. 30 tablet 11    Lancets (PRODIGY TWIST TOP LANCET) misc Use to check blood glucose level twice a day and PRN. 200 Each 3    alcohol swabs (BD SINGLE USE SWABS REGULAR) padm Use to cleanse finger prior to checking glucose level. 200 Each 3    Omega-3-DHA-EPA-Fish Oil 1,000 (120-180) mg Cap Take 1 Cap by mouth two (2) times a week. No current facility-administered medications on file prior to visit. No Known Allergies    OB History    No obstetric history on file. Obstetric Comments   Menarche 16, LMP unknown, # of children 0, age of 1st delivery N/A, Hysterectomy/oophorectomy No/No, Breast bx Yes, history of breast feeding No, BCP No, Hormone therapy No             ROS    Physical Exam  Exam conducted with a chaperone present. Cardiovascular:      Rate and Rhythm: Normal rate and regular rhythm. Heart sounds: Normal heart sounds. Pulmonary:      Breath sounds: Normal breath sounds. Chest:      Breasts: Breasts are symmetrical.         Right: Normal. No swelling, bleeding, inverted nipple, mass, nipple discharge, skin change or tenderness. Left: Normal. No swelling, bleeding, inverted nipple, mass, nipple discharge, skin change or tenderness. Lymphadenopathy:      Cervical:      Right cervical: No superficial, deep or posterior cervical adenopathy. Left cervical: No superficial, deep or posterior cervical adenopathy. Upper Body:      Right upper body: No supraclavicular or axillary adenopathy. Left upper body: No supraclavicular or axillary adenopathy. BREAST ULTRASOUND, Pre-op Planning  Indication: Pre-op planning, LEFT breast   Technique: The area was scanned using a high-frequency linear-array near-field transducer  Findings: Bx clip along the bx tract, and hematoma is resolved  Impression: Breast cancer  Disposition: Surgery      ASSESSMENT and PLAN    ICD-10-CM ICD-9-CM    1. Ductal carcinoma in situ (DCIS) of left breast  D05.12 233.0    2. History of left breast cancer  Z85.3 V10.3       Patient presents to follow up on LEFT breast DCIS and hematoma, and is doing well overall. No palpable mass on exam. LEFT breast US visualizes bx clip along the bx tract, and hematoma is resolved. Discussed surgery, and pt notes preference for breast conservation. Discussed lumpectomy with wire loc, and reviewed the importance of negative margins. Also discussed possible adjuvant treatment with estrogen blocker if any invasive disease is found in surgery. Pt understands and consents to surgery. Will schedule for the near future at [facility], and scheduling will f/u with the date. This plan was reviewed with the patient and patient agrees. All questions were answered. Total time spent was 20 minutes.     Written by Nitesh Enamorado, as dictated by Dr. Jeannie Templeton MD.

## 2021-08-04 NOTE — PROGRESS NOTES
HISTORY OF PRESENT ILLNESS  Nivia Segovia is a 77 y.o. female. HPI ESTABLISHED patient here for follow up hematoma. She has not been having pain and feels like the area is smaller than the last visit.       06/24/21: LEFT breast bx, 3:00. PATH: DCIS, 5mm largest single glass slide measurement, papillary and cribriform, nuclear grade 2 with focal necrosis, ER+(100%). Clinical stage 0.     ROS    Physical Exam    ASSESSMENT and PLAN  {ASSESSMENT/PLAN:[id number]}

## 2021-08-09 DIAGNOSIS — D05.12 DUCTAL CARCINOMA IN SITU OF LEFT BREAST: Primary | ICD-10-CM

## 2021-08-09 DIAGNOSIS — Z85.3 PERSONAL HISTORY OF BREAST CANCER: ICD-10-CM

## 2021-08-09 DIAGNOSIS — Z85.3 HISTORY OF MALIGNANT NEOPLASM OF LEFT BREAST: ICD-10-CM

## 2021-08-11 ENCOUNTER — HOSPITAL ENCOUNTER (OUTPATIENT)
Dept: PREADMISSION TESTING | Age: 76
Discharge: HOME | End: 2021-08-11
Payer: MEDICARE

## 2021-08-11 VITALS
SYSTOLIC BLOOD PRESSURE: 175 MMHG | HEART RATE: 61 BPM | DIASTOLIC BLOOD PRESSURE: 93 MMHG | TEMPERATURE: 97.8 F | WEIGHT: 175.04 LBS | HEIGHT: 64 IN | BODY MASS INDEX: 29.88 KG/M2

## 2021-08-11 LAB
ANION GAP SERPL CALC-SCNC: 7 MMOL/L (ref 5–15)
BASOPHILS # BLD: 0 K/UL (ref 0–0.1)
BASOPHILS NFR BLD: 1 % (ref 0–1)
BUN SERPL-MCNC: 13 MG/DL (ref 6–20)
BUN/CREAT SERPL: 26 (ref 12–20)
CALCIUM SERPL-MCNC: 9.3 MG/DL (ref 8.5–10.1)
CHLORIDE SERPL-SCNC: 103 MMOL/L (ref 97–108)
CO2 SERPL-SCNC: 30 MMOL/L (ref 21–32)
CREAT SERPL-MCNC: 0.5 MG/DL (ref 0.55–1.02)
DIFFERENTIAL METHOD BLD: ABNORMAL
EOSINOPHIL # BLD: 0.2 K/UL (ref 0–0.4)
EOSINOPHIL NFR BLD: 4 % (ref 0–7)
ERYTHROCYTE [DISTWIDTH] IN BLOOD BY AUTOMATED COUNT: 12.1 % (ref 11.5–14.5)
EST. AVERAGE GLUCOSE BLD GHB EST-MCNC: 151 MG/DL
GLUCOSE SERPL-MCNC: 165 MG/DL (ref 65–100)
HBA1C MFR BLD: 6.9 % (ref 4–5.6)
HCT VFR BLD AUTO: 37.6 % (ref 35–47)
HGB BLD-MCNC: 12.4 G/DL (ref 11.5–16)
IMM GRANULOCYTES # BLD AUTO: 0 K/UL (ref 0–0.04)
IMM GRANULOCYTES NFR BLD AUTO: 1 % (ref 0–0.5)
LYMPHOCYTES # BLD: 1 K/UL (ref 0.8–3.5)
LYMPHOCYTES NFR BLD: 26 % (ref 12–49)
MCH RBC QN AUTO: 30.9 PG (ref 26–34)
MCHC RBC AUTO-ENTMCNC: 33 G/DL (ref 30–36.5)
MCV RBC AUTO: 93.8 FL (ref 80–99)
MONOCYTES # BLD: 0.4 K/UL (ref 0–1)
MONOCYTES NFR BLD: 12 % (ref 5–13)
NEUTS SEG # BLD: 2.1 K/UL (ref 1.8–8)
NEUTS SEG NFR BLD: 56 % (ref 32–75)
NRBC # BLD: 0 K/UL (ref 0–0.01)
NRBC BLD-RTO: 0 PER 100 WBC
PLATELET # BLD AUTO: 248 K/UL (ref 150–400)
PMV BLD AUTO: 10.6 FL (ref 8.9–12.9)
POTASSIUM SERPL-SCNC: 3.8 MMOL/L (ref 3.5–5.1)
RBC # BLD AUTO: 4.01 M/UL (ref 3.8–5.2)
SODIUM SERPL-SCNC: 140 MMOL/L (ref 136–145)
WBC # BLD AUTO: 3.8 K/UL (ref 3.6–11)

## 2021-08-11 PROCEDURE — 93005 ELECTROCARDIOGRAM TRACING: CPT

## 2021-08-11 PROCEDURE — 85025 COMPLETE CBC W/AUTO DIFF WBC: CPT

## 2021-08-11 PROCEDURE — 83036 HEMOGLOBIN GLYCOSYLATED A1C: CPT

## 2021-08-11 PROCEDURE — 80048 BASIC METABOLIC PNL TOTAL CA: CPT

## 2021-08-11 NOTE — PERIOP NOTES
PATIENT GIVEN SURGICAL SITE INFECTION FAQ HANDOUT AND HAND WASHING TIP SHEET. PREOP INSTRUCTIONS REVIEWED AND PATIENT VERBALIZES UNDERSTANDING OF INSTRUCTIONS. PATIENT HAS BEEN GIVEN THE OPPORTUNITY TO ASK ADDITIONAL QUESTIONS. GAVE PATIENT 2 BOTTLES OF CHG CLEANSER AND INSTRUCTIONS. PATIENT VERBALIZED UNDERSTANDING    PATIENT GIVEN CURRENT COVID 19 GUIDELINES INSTRUCTIONS FOR ST [address]. PATIENT VERBALIZED UNDERESTANDING.

## 2021-08-13 LAB
ATRIAL RATE: 57 BPM
CALCULATED P AXIS, ECG09: 15 DEGREES
CALCULATED R AXIS, ECG10: -2 DEGREES
CALCULATED T AXIS, ECG11: 18 DEGREES
DIAGNOSIS, 93000: NORMAL
P-R INTERVAL, ECG05: 176 MS
Q-T INTERVAL, ECG07: 434 MS
QRS DURATION, ECG06: 76 MS
QTC CALCULATION (BEZET), ECG08: 422 MS
VENTRICULAR RATE, ECG03: 57 BPM

## 2021-08-17 ENCOUNTER — ANESTHESIA EVENT (OUTPATIENT)
Dept: MEDSURG UNIT | Age: 76
End: 2021-08-17
Payer: MEDICARE

## 2021-08-17 ENCOUNTER — DOCUMENTATION ONLY (OUTPATIENT)
Dept: SURGERY | Age: 76
End: 2021-08-17

## 2021-08-17 RX ORDER — FENTANYL CITRATE 50 UG/ML
25 INJECTION, SOLUTION INTRAMUSCULAR; INTRAVENOUS
Status: CANCELLED | OUTPATIENT
Start: 2021-08-17

## 2021-08-17 RX ORDER — MIDAZOLAM HYDROCHLORIDE 1 MG/ML
1 INJECTION, SOLUTION INTRAMUSCULAR; INTRAVENOUS
Status: CANCELLED | OUTPATIENT
Start: 2021-08-17

## 2021-08-17 RX ORDER — DIPHENHYDRAMINE HYDROCHLORIDE 50 MG/ML
12.5 INJECTION, SOLUTION INTRAMUSCULAR; INTRAVENOUS AS NEEDED
Status: CANCELLED | OUTPATIENT
Start: 2021-08-17 | End: 2021-08-17

## 2021-08-17 RX ORDER — SODIUM CHLORIDE, SODIUM LACTATE, POTASSIUM CHLORIDE, CALCIUM CHLORIDE 600; 310; 30; 20 MG/100ML; MG/100ML; MG/100ML; MG/100ML
125 INJECTION, SOLUTION INTRAVENOUS CONTINUOUS
Status: CANCELLED | OUTPATIENT
Start: 2021-08-17

## 2021-08-17 RX ORDER — MORPHINE SULFATE 2 MG/ML
2 INJECTION, SOLUTION INTRAMUSCULAR; INTRAVENOUS
Status: CANCELLED | OUTPATIENT
Start: 2021-08-17

## 2021-08-17 RX ORDER — OXYCODONE HYDROCHLORIDE 5 MG/1
5 TABLET ORAL AS NEEDED
Status: CANCELLED | OUTPATIENT
Start: 2021-08-17

## 2021-08-17 RX ORDER — SODIUM CHLORIDE 0.9 % (FLUSH) 0.9 %
5-40 SYRINGE (ML) INJECTION AS NEEDED
Status: CANCELLED | OUTPATIENT
Start: 2021-08-17

## 2021-08-17 RX ORDER — SODIUM CHLORIDE 0.9 % (FLUSH) 0.9 %
5-40 SYRINGE (ML) INJECTION EVERY 8 HOURS
Status: CANCELLED | OUTPATIENT
Start: 2021-08-17

## 2021-08-17 RX ORDER — ONDANSETRON 2 MG/ML
4 INJECTION INTRAMUSCULAR; INTRAVENOUS AS NEEDED
Status: CANCELLED | OUTPATIENT
Start: 2021-08-17

## 2021-08-17 NOTE — PROGRESS NOTES
Verbally informed patient surgery  [facility] ON 08/18/2021    Date: 08/18/2021 @ 1:00 PM  Arrive: 9:45 AM  report to 1st floor outpatient registration day of surgery. PAT: NURSE called   Arrive:nurse report to [address] [place] [address], [address]; [phone] following instructions:  NPO after Midnight the night before  Shower in AM, no lotion, deodorant, powder,perfume or makeup  Will need  morning of the surgery      Post op appointment to follow.

## 2021-08-18 ENCOUNTER — APPOINTMENT (OUTPATIENT)
Dept: MAMMOGRAPHY | Age: 76
End: 2021-08-18
Attending: SURGERY
Payer: MEDICARE

## 2021-08-18 ENCOUNTER — ANESTHESIA (OUTPATIENT)
Dept: MEDSURG UNIT | Age: 76
End: 2021-08-18
Payer: MEDICARE

## 2021-08-18 ENCOUNTER — HOSPITAL ENCOUNTER (OUTPATIENT)
Age: 76
Setting detail: OUTPATIENT SURGERY
Discharge: HOME | End: 2021-08-18
Attending: SURGERY | Admitting: SURGERY
Payer: MEDICARE

## 2021-08-18 VITALS
SYSTOLIC BLOOD PRESSURE: 128 MMHG | HEART RATE: 68 BPM | TEMPERATURE: 97.7 F | OXYGEN SATURATION: 97 % | RESPIRATION RATE: 16 BRPM | WEIGHT: 175 LBS | HEIGHT: 63 IN | BODY MASS INDEX: 31.01 KG/M2 | DIASTOLIC BLOOD PRESSURE: 96 MMHG

## 2021-08-18 DIAGNOSIS — Z85.3 HISTORY OF MALIGNANT NEOPLASM OF LEFT BREAST: ICD-10-CM

## 2021-08-18 DIAGNOSIS — D05.12 DUCTAL CARCINOMA IN SITU OF LEFT BREAST: ICD-10-CM

## 2021-08-18 DIAGNOSIS — D05.12 DUCTAL CARCINOMA IN SITU (DCIS) OF LEFT BREAST: ICD-10-CM

## 2021-08-18 DIAGNOSIS — Z85.3 PERSONAL HISTORY OF BREAST CANCER: ICD-10-CM

## 2021-08-18 LAB
GLUCOSE BLD STRIP.AUTO-MCNC: 122 MG/DL (ref 65–117)
SERVICE CMNT-IMP: ABNORMAL

## 2021-08-18 PROCEDURE — 77030008684 HC TU ET CUF COVD -B: Performed by: ANESTHESIOLOGY

## 2021-08-18 PROCEDURE — 74011000250 HC RX REV CODE- 250: Performed by: REGISTERED NURSE

## 2021-08-18 PROCEDURE — C1819 TISSUE LOCALIZATION-EXCISION: HCPCS

## 2021-08-18 PROCEDURE — 77030041680 HC PNCL ELECSURG SMK EVAC CNMD -B: Performed by: SURGERY

## 2021-08-18 PROCEDURE — 77030002933 HC SUT MCRYL J&J -A: Performed by: SURGERY

## 2021-08-18 PROCEDURE — 76030000001 HC AMB SURG OR TIME 1 TO 1.5: Performed by: SURGERY

## 2021-08-18 PROCEDURE — 74011250636 HC RX REV CODE- 250/636: Performed by: REGISTERED NURSE

## 2021-08-18 PROCEDURE — 77030040922 HC BLNKT HYPOTHRM STRY -A

## 2021-08-18 PROCEDURE — 14301 TIS TRNFR ANY 30.1-60 SQ CM: CPT | Performed by: SURGERY

## 2021-08-18 PROCEDURE — 77030008552 HC TBNG SMK EVAC BFLF -A: Performed by: SURGERY

## 2021-08-18 PROCEDURE — 77030002996 HC SUT SLK J&J -A: Performed by: SURGERY

## 2021-08-18 PROCEDURE — 76210000036 HC AMBSU PH I REC 1.5 TO 2 HR: Performed by: SURGERY

## 2021-08-18 PROCEDURE — 77030031139 HC SUT VCRL2 J&J -A: Performed by: SURGERY

## 2021-08-18 PROCEDURE — 76060000062 HC AMB SURG ANES 1 TO 1.5 HR: Performed by: SURGERY

## 2021-08-18 PROCEDURE — 82962 GLUCOSE BLOOD TEST: CPT

## 2021-08-18 PROCEDURE — 77030040361 HC SLV COMPR DVT MDII -B: Performed by: SURGERY

## 2021-08-18 PROCEDURE — 19301 PARTIAL MASTECTOMY: CPT | Performed by: SURGERY

## 2021-08-18 PROCEDURE — 74011250637 HC RX REV CODE- 250/637: Performed by: ANESTHESIOLOGY

## 2021-08-18 PROCEDURE — 74011250636 HC RX REV CODE- 250/636: Performed by: SURGERY

## 2021-08-18 PROCEDURE — 74011000250 HC RX REV CODE- 250: Performed by: SURGERY

## 2021-08-18 PROCEDURE — 88342 IMHCHEM/IMCYTCHM 1ST ANTB: CPT

## 2021-08-18 PROCEDURE — 88307 TISSUE EXAM BY PATHOLOGIST: CPT

## 2021-08-18 PROCEDURE — 77030039266 HC ADH SKN EXOFIN S2SG -A: Performed by: SURGERY

## 2021-08-18 PROCEDURE — 2709999900 HC NON-CHARGEABLE SUPPLY: Performed by: SURGERY

## 2021-08-18 PROCEDURE — 88360 TUMOR IMMUNOHISTOCHEM/MANUAL: CPT

## 2021-08-18 PROCEDURE — 74011000250 HC RX REV CODE- 250: Performed by: RADIOLOGY

## 2021-08-18 PROCEDURE — 77030026438 HC STYL ET INTUB CARD -A: Performed by: ANESTHESIOLOGY

## 2021-08-18 PROCEDURE — 77030011267 HC ELECTRD BLD COVD -A: Performed by: SURGERY

## 2021-08-18 PROCEDURE — 88341 IMHCHEM/IMCYTCHM EA ADD ANTB: CPT

## 2021-08-18 RX ORDER — SODIUM CHLORIDE 0.9 % (FLUSH) 0.9 %
5-40 SYRINGE (ML) INJECTION EVERY 8 HOURS
Status: DISCONTINUED | OUTPATIENT
Start: 2021-08-18 | End: 2021-08-18 | Stop reason: HOSPADM

## 2021-08-18 RX ORDER — FENTANYL CITRATE 50 UG/ML
50 INJECTION, SOLUTION INTRAMUSCULAR; INTRAVENOUS AS NEEDED
Status: DISCONTINUED | OUTPATIENT
Start: 2021-08-18 | End: 2021-08-18 | Stop reason: HOSPADM

## 2021-08-18 RX ORDER — SUCCINYLCHOLINE CHLORIDE 20 MG/ML
INJECTION INTRAMUSCULAR; INTRAVENOUS AS NEEDED
Status: DISCONTINUED | OUTPATIENT
Start: 2021-08-18 | End: 2021-08-18 | Stop reason: HOSPADM

## 2021-08-18 RX ORDER — SODIUM CHLORIDE, SODIUM LACTATE, POTASSIUM CHLORIDE, CALCIUM CHLORIDE 600; 310; 30; 20 MG/100ML; MG/100ML; MG/100ML; MG/100ML
INJECTION, SOLUTION INTRAVENOUS
Status: DISCONTINUED | OUTPATIENT
Start: 2021-08-18 | End: 2021-08-18 | Stop reason: HOSPADM

## 2021-08-18 RX ORDER — LIDOCAINE HYDROCHLORIDE AND EPINEPHRINE 10; 10 MG/ML; UG/ML
30 INJECTION, SOLUTION INFILTRATION; PERINEURAL ONCE
Status: CANCELLED | OUTPATIENT
Start: 2021-08-18 | End: 2021-08-18

## 2021-08-18 RX ORDER — GLYCOPYRROLATE 0.2 MG/ML
INJECTION INTRAMUSCULAR; INTRAVENOUS AS NEEDED
Status: DISCONTINUED | OUTPATIENT
Start: 2021-08-18 | End: 2021-08-18 | Stop reason: HOSPADM

## 2021-08-18 RX ORDER — HYDROCODONE BITARTRATE AND ACETAMINOPHEN 5; 325 MG/1; MG/1
1 TABLET ORAL
Qty: 20 TABLET | Refills: 0 | Status: SHIPPED | OUTPATIENT
Start: 2021-08-18 | End: 2021-08-25

## 2021-08-18 RX ORDER — FENTANYL CITRATE 50 UG/ML
INJECTION, SOLUTION INTRAMUSCULAR; INTRAVENOUS AS NEEDED
Status: DISCONTINUED | OUTPATIENT
Start: 2021-08-18 | End: 2021-08-18 | Stop reason: HOSPADM

## 2021-08-18 RX ORDER — PROPOFOL 10 MG/ML
INJECTION, EMULSION INTRAVENOUS AS NEEDED
Status: DISCONTINUED | OUTPATIENT
Start: 2021-08-18 | End: 2021-08-18 | Stop reason: HOSPADM

## 2021-08-18 RX ORDER — MIDAZOLAM HYDROCHLORIDE 1 MG/ML
INJECTION, SOLUTION INTRAMUSCULAR; INTRAVENOUS AS NEEDED
Status: DISCONTINUED | OUTPATIENT
Start: 2021-08-18 | End: 2021-08-18 | Stop reason: HOSPADM

## 2021-08-18 RX ORDER — MIDAZOLAM HYDROCHLORIDE 1 MG/ML
1 INJECTION, SOLUTION INTRAMUSCULAR; INTRAVENOUS AS NEEDED
Status: DISCONTINUED | OUTPATIENT
Start: 2021-08-18 | End: 2021-08-18 | Stop reason: HOSPADM

## 2021-08-18 RX ORDER — LIDOCAINE HYDROCHLORIDE 10 MG/ML
0.5 INJECTION, SOLUTION EPIDURAL; INFILTRATION; INTRACAUDAL; PERINEURAL AS NEEDED
Status: DISCONTINUED | OUTPATIENT
Start: 2021-08-18 | End: 2021-08-18 | Stop reason: HOSPADM

## 2021-08-18 RX ORDER — BUPIVACAINE HYDROCHLORIDE AND EPINEPHRINE 5; 5 MG/ML; UG/ML
30 INJECTION, SOLUTION EPIDURAL; INTRACAUDAL; PERINEURAL ONCE
Status: CANCELLED | OUTPATIENT
Start: 2021-08-18 | End: 2021-08-18

## 2021-08-18 RX ORDER — ONDANSETRON 2 MG/ML
INJECTION INTRAMUSCULAR; INTRAVENOUS AS NEEDED
Status: DISCONTINUED | OUTPATIENT
Start: 2021-08-18 | End: 2021-08-18 | Stop reason: HOSPADM

## 2021-08-18 RX ORDER — LIDOCAINE HYDROCHLORIDE 10 MG/ML
5 INJECTION INFILTRATION; PERINEURAL
Status: COMPLETED | OUTPATIENT
Start: 2021-08-18 | End: 2021-08-18

## 2021-08-18 RX ORDER — EPHEDRINE SULFATE/0.9% NACL/PF 50 MG/5 ML
SYRINGE (ML) INTRAVENOUS AS NEEDED
Status: DISCONTINUED | OUTPATIENT
Start: 2021-08-18 | End: 2021-08-18 | Stop reason: HOSPADM

## 2021-08-18 RX ORDER — ROCURONIUM BROMIDE 10 MG/ML
INJECTION, SOLUTION INTRAVENOUS AS NEEDED
Status: DISCONTINUED | OUTPATIENT
Start: 2021-08-18 | End: 2021-08-18 | Stop reason: HOSPADM

## 2021-08-18 RX ORDER — PHENYLEPHRINE HCL IN 0.9% NACL 0.4MG/10ML
SYRINGE (ML) INTRAVENOUS AS NEEDED
Status: DISCONTINUED | OUTPATIENT
Start: 2021-08-18 | End: 2021-08-18 | Stop reason: HOSPADM

## 2021-08-18 RX ORDER — LIDOCAINE HYDROCHLORIDE 20 MG/ML
INJECTION, SOLUTION EPIDURAL; INFILTRATION; INTRACAUDAL; PERINEURAL AS NEEDED
Status: DISCONTINUED | OUTPATIENT
Start: 2021-08-18 | End: 2021-08-18 | Stop reason: HOSPADM

## 2021-08-18 RX ORDER — SODIUM CHLORIDE, SODIUM LACTATE, POTASSIUM CHLORIDE, CALCIUM CHLORIDE 600; 310; 30; 20 MG/100ML; MG/100ML; MG/100ML; MG/100ML
125 INJECTION, SOLUTION INTRAVENOUS CONTINUOUS
Status: DISCONTINUED | OUTPATIENT
Start: 2021-08-18 | End: 2021-08-18 | Stop reason: HOSPADM

## 2021-08-18 RX ORDER — DEXAMETHASONE SODIUM PHOSPHATE 4 MG/ML
INJECTION, SOLUTION INTRA-ARTICULAR; INTRALESIONAL; INTRAMUSCULAR; INTRAVENOUS; SOFT TISSUE AS NEEDED
Status: DISCONTINUED | OUTPATIENT
Start: 2021-08-18 | End: 2021-08-18 | Stop reason: HOSPADM

## 2021-08-18 RX ORDER — DEXTROSE, SODIUM CHLORIDE, SODIUM LACTATE, POTASSIUM CHLORIDE, AND CALCIUM CHLORIDE 5; .6; .31; .03; .02 G/100ML; G/100ML; G/100ML; G/100ML; G/100ML
125 INJECTION, SOLUTION INTRAVENOUS CONTINUOUS
Status: DISCONTINUED | OUTPATIENT
Start: 2021-08-18 | End: 2021-08-18 | Stop reason: HOSPADM

## 2021-08-18 RX ORDER — NEOSTIGMINE METHYLSULFATE 1 MG/ML
INJECTION, SOLUTION INTRAVENOUS AS NEEDED
Status: DISCONTINUED | OUTPATIENT
Start: 2021-08-18 | End: 2021-08-18 | Stop reason: HOSPADM

## 2021-08-18 RX ORDER — SODIUM CHLORIDE 0.9 % (FLUSH) 0.9 %
5-40 SYRINGE (ML) INJECTION AS NEEDED
Status: DISCONTINUED | OUTPATIENT
Start: 2021-08-18 | End: 2021-08-18 | Stop reason: HOSPADM

## 2021-08-18 RX ORDER — ACETAMINOPHEN 325 MG/1
650 TABLET ORAL ONCE
Status: COMPLETED | OUTPATIENT
Start: 2021-08-18 | End: 2021-08-18

## 2021-08-18 RX ADMIN — LIDOCAINE HYDROCHLORIDE 80 MG: 20 INJECTION, SOLUTION EPIDURAL; INFILTRATION; INTRACAUDAL; PERINEURAL at 13:03

## 2021-08-18 RX ADMIN — Medication 80 MCG: at 13:37

## 2021-08-18 RX ADMIN — DEXAMETHASONE SODIUM PHOSPHATE 4 MG: 4 INJECTION, SOLUTION INTRAMUSCULAR; INTRAVENOUS at 13:12

## 2021-08-18 RX ADMIN — WATER 2 G: 1 INJECTION INTRAMUSCULAR; INTRAVENOUS; SUBCUTANEOUS at 13:14

## 2021-08-18 RX ADMIN — Medication 5 MG: at 13:52

## 2021-08-18 RX ADMIN — Medication 5 MG: at 13:08

## 2021-08-18 RX ADMIN — Medication 40 MCG: at 13:45

## 2021-08-18 RX ADMIN — ONDANSETRON HYDROCHLORIDE 4 MG: 2 INJECTION, SOLUTION INTRAMUSCULAR; INTRAVENOUS at 13:54

## 2021-08-18 RX ADMIN — MIDAZOLAM 2 MG: 1 INJECTION INTRAMUSCULAR; INTRAVENOUS at 12:51

## 2021-08-18 RX ADMIN — ACETAMINOPHEN 650 MG: 325 TABLET ORAL at 11:25

## 2021-08-18 RX ADMIN — NEOSTIGMINE METHYLSULFATE 3 MG: 1 INJECTION, SOLUTION INTRAVENOUS at 13:54

## 2021-08-18 RX ADMIN — FENTANYL CITRATE 50 MCG: 50 INJECTION, SOLUTION INTRAMUSCULAR; INTRAVENOUS at 13:03

## 2021-08-18 RX ADMIN — Medication 5 MG: at 13:17

## 2021-08-18 RX ADMIN — Medication 80 MCG: at 13:25

## 2021-08-18 RX ADMIN — GLYCOPYRROLATE 0.4 MG: 0.2 INJECTION, SOLUTION INTRAMUSCULAR; INTRAVENOUS at 13:54

## 2021-08-18 RX ADMIN — PROPOFOL 50 MG: 10 INJECTION, EMULSION INTRAVENOUS at 13:11

## 2021-08-18 RX ADMIN — LIDOCAINE HYDROCHLORIDE 5 ML: 10 INJECTION, SOLUTION INFILTRATION; PERINEURAL at 10:20

## 2021-08-18 RX ADMIN — SUCCINYLCHOLINE CHLORIDE 140 MG: 20 INJECTION, SOLUTION INTRAMUSCULAR; INTRAVENOUS at 13:04

## 2021-08-18 RX ADMIN — PROPOFOL 150 MG: 10 INJECTION, EMULSION INTRAVENOUS at 13:03

## 2021-08-18 RX ADMIN — SODIUM CHLORIDE, POTASSIUM CHLORIDE, SODIUM LACTATE AND CALCIUM CHLORIDE: 600; 310; 30; 20 INJECTION, SOLUTION INTRAVENOUS at 12:51

## 2021-08-18 RX ADMIN — ROCURONIUM BROMIDE 5 MG: 10 SOLUTION INTRAVENOUS at 13:03

## 2021-08-18 RX ADMIN — ROCURONIUM BROMIDE 20 MG: 10 SOLUTION INTRAVENOUS at 13:28

## 2021-08-18 RX ADMIN — Medication 40 MCG: at 13:19

## 2021-08-18 RX ADMIN — GLYCOPYRROLATE 0.2 MG: 0.2 INJECTION, SOLUTION INTRAMUSCULAR; INTRAVENOUS at 14:02

## 2021-08-18 NOTE — DISCHARGE INSTRUCTIONS
DISCHARGE SUMMARY from Nurse    PATIENT INSTRUCTIONS:    After general anesthesia or intravenous sedation, for 24 hours or while taking prescription Narcotics:  Limit your activities  Do not drive and operate hazardous machinery  Do not make important personal or business decisions  Do  not drink alcoholic beverages  If you have not urinated within 8 hours after discharge, please contact your surgeon on call. Report the following to your surgeon:  Excessive pain, swelling, redness or odor of or around the surgical area  Temperature over 100.5  Nausea and vomiting lasting longer than 4 hours or if unable to take medications  Any signs of decreased circulation or nerve impairment to extremity: change in color, persistent  numbness, tingling, coldness or increase pain  Any questions          _                              Discharge Instructions from Dr. Jose Raul Diaz    · I will call you with the pathology results, typically within 1 week from today. · You may shower, but no hot tubs, swimming pools, or baths until your incision is healed. · No heavy lifting with the affected extremity (nothing greater than 5 pounds), and limit its use for the next 4-5 days. · You may use an ice pack for comfort for the next couple of days, but do not place ice directly on the skin. Rather, use a towel or clothing to serve as a barrier between skin and ice to prevent injury. · If I placed a drain, follow the drain instructions provided, especially as you keep a record of the drain output. · Follow medication instructions carefully. · Watch for signs of infection as listed below. · Redness  · Swelling  · Drainage from the incision or from your nipple that appears infected  · Fever over 101 degrees for consecutive readings, or over 99.5 if you are currently undergoing chemotherapy. · Call our office (number is below) for a follow-up appointment. · If you have any problems, our phone number is [phone].

## 2021-08-18 NOTE — H&P
HISTORY OF PRESENT ILLNESS  Joey Talavera is a 77 y.o. female. HPI  ESTABLISHED patient here to follow up on LEFT breast DCIS and hematoma. She has not been having pain and feels like the area is smaller than the last visit.     06/24/21: LEFT breast bx, 3:00. PATH: DCIS, 5mm largest single glass slide measurement, papillary and cribriform, nuclear grade 2 with focal necrosis, ER+(100%). Clinical stage 0.             Past Medical History:   Diagnosis Date    Arthritis       knee    Cancer ([facility] [address] 2003     melanoma -right lower extremity    Chronic pain       left knee & left shoulder    Diabetes ([facility] [address]       type 2    Hypertension      Nausea & vomiting      Screen for colon cancer 10/2/2014               Past Surgical History:   Procedure Laterality Date    HX BREAST BIOPSY Right       yrs ago    HX BREAST BIOPSY Left 06/2021    HX CATARACT REMOVAL   2010     bilateral eyes    HX CHOLECYSTECTOMY        HX HYSTERECTOMY   1990     fibroid    HX KNEE ARTHROSCOPY   1999     left knee replacement    HX ORTHOPAEDIC         age 40- right foot    HX ORTHOPAEDIC   9/11/2015     left wrist surgery    HX OTHER SURGICAL   2005     permanent teeth replacements    HX OTHER SURGICAL Right 2003     melonoma removed rt leg    HX ROTATOR CUFF REPAIR   09/28/2020     left    HX WISDOM TEETH EXTRACTION        [place] COLONOSCOPY FLX DX W/COLLJ SPEC WHEN PFRMD   2007     date and md unknown         Social History            Socioeconomic History    Marital status:        Spouse name: Not on file    Number of children: Not on file    Years of education: Not on file    Highest education level: Not on file   Occupational History    Not on file   Tobacco Use    Smoking status: Never Smoker    Smokeless tobacco: Never Used   Vaping Use    Vaping Use: Never used   Substance and Sexual Activity    Alcohol use: No       Alcohol/week: 0.0 standard drinks    Drug use:  Yes       Types: Marijuana       Comment: edibles-every 2 weeks    Sexual activity: Yes       Partners: Male       Birth control/protection: None, Surgical   Other Topics Concern    Not on file   Social History Narrative    Not on file      Social Determinants of Health          Financial Resource Strain:     Difficulty of Paying Living Expenses:    Food Insecurity:     Worried About Running Out of Food in the Last Year:     [address] in the Last Year:    Transportation Needs:     Lack of Transportation (Medical):  Lack of Transportation (Non-Medical):    Physical Activity:     Days of Exercise per Week:     Minutes of Exercise per Session:    Stress:     Feeling of Stress :    Social Connections:     Frequency of Communication with Friends and Family:     Frequency of Social Gatherings with Friends and Family:     Attends [employer] Services:     Active Member of Clubs or Organizations:     Attends Club or Organization Meetings:     Marital Status:    Intimate Partner Violence:     Fear of Current or Ex-Partner:     Emotionally Abused:     Physically Abused:     Sexually Abused:          Current Outpatient Medications on File Prior to Visit   Medication Sig Dispense Refill    metFORMIN (GLUCOPHAGE) 500 mg tablet TAKE 1 TABLET BY MOUTH TWICE DAILY WITH MEALS 180 Tablet 3    glucose blood VI test strips (True Metrix Glucose Test Strip) strip USE TO TEST BLOOD SUGAR TWICE DAILY 200 Strip 5    fluticasone propionate (FLONASE) 50 mcg/actuation nasal spray SHAKE LIQUID AND USE 2 SPRAYS IN EACH NOSTRIL DAILY AS NEEDED FOR RHINITIS 16 g 3    rosuvastatin (CRESTOR) 5 mg tablet Take 1 Tab by mouth nightly.  30 Tab 3    amLODIPine (NORVASC) 5 mg tablet TAKE 1 TABLET BY MOUTH DAILY 90 Tab 3    hydroCHLOROthiazide (HYDRODIURIL) 12.5 mg tablet TAKE 1 TABLET BY MOUTH DAILY 90 Tab 3    losartan (COZAAR) 100 mg tablet TAKE 1 TABLET BY MOUTH DAILY 90 Tab 3    cholecalciferol (VITAMIN D3) (2,000 UNITS /50 MCG) cap capsule Take 2,000 Units by mouth daily.        Blood-Glucose Meter (TRUE METRIX AIR GLUCOSE METER) misc Use to check blood sugar twice a day, E11.9 1 Each 0    Blood Glucose Control, Low (TRUE METRIX LEVEL 1) soln Use as directed. E11.9 1 Each 3    albuterol (PROVENTIL HFA, VENTOLIN HFA, PROAIR HFA) 90 mcg/actuation inhaler Take 2 Puffs by inhalation every four (4) hours as needed for Wheezing. 1 Inhaler 11    aspirin delayed-release 81 mg tablet Take 1 Tab by mouth daily. 30 Tab 6    loratadine (CLARITIN) 10 mg tablet Take 1 tablet by mouth daily as needed for Allergies. 30 tablet 11    Lancets (PRODIGY TWIST TOP LANCET) misc Use to check blood glucose level twice a day and PRN. 200 Each 3    alcohol swabs (BD SINGLE USE SWABS REGULAR) padm Use to cleanse finger prior to checking glucose level. 200 Each 3    Omega-3-DHA-EPA-Fish Oil 1,000 (120-180) mg Cap Take 1 Cap by mouth two (2) times a week.          No current facility-administered medications on file prior to visit.         No Known Allergies     OB History    No obstetric history on file.       Obstetric Comments   Menarche 13, LMP unknown, # of children 0, age of 1st delivery N/A, Hysterectomy/oophorectomy No/No, Breast bx Yes, history of breast feeding No, BCP No, Hormone therapy No                ROS     Physical Exam  Exam conducted with a chaperone present. Cardiovascular:      Rate and Rhythm: Normal rate and regular rhythm. Heart sounds: Normal heart sounds. Pulmonary:      Breath sounds: Normal breath sounds. Chest:      Breasts: Breasts are symmetrical.         Right: Normal. No swelling, bleeding, inverted nipple, mass, nipple discharge, skin change or tenderness. Left: Normal. No swelling, bleeding, inverted nipple, mass, nipple discharge, skin change or tenderness. Lymphadenopathy:      Cervical:      Right cervical: No superficial, deep or posterior cervical adenopathy.      Left cervical: No superficial, deep or posterior cervical adenopathy. Upper Body:      Right upper body: No supraclavicular or axillary adenopathy. Left upper body: No supraclavicular or axillary adenopathy.       BREAST ULTRASOUND, Pre-op Planning  Indication: Pre-op planning, LEFT breast   Technique: The area was scanned using a high-frequency linear-array near-field transducer  Findings: Bx clip along the bx tract, and hematoma is resolved  Impression: Breast cancer  Disposition: Surgery        ASSESSMENT and PLAN      ICD-10-CM ICD-9-CM     1. Ductal carcinoma in situ (DCIS) of left breast  D05.12 233.0     2. History of left breast cancer  Z85.3 V10.3        Patient presents to follow up on LEFT breast DCIS and hematoma, and is doing well overall. No palpable mass on exam. LEFT breast US visualizes bx clip along the bx tract, and hematoma is resolved.     Discussed surgery, and pt notes preference for breast conservation. Discussed lumpectomy with wire loc, and reviewed the importance of negative margins. Also discussed possible adjuvant treatment with estrogen blocker if any invasive disease is found in surgery.     Pt understands and consents to surgery. Will schedule for the near future at [address] [place] [address], and scheduling will f/u with the date. This plan was reviewed with the patient and patient agrees. All questions were answered.

## 2021-08-18 NOTE — ANESTHESIA POSTPROCEDURE EVALUATION
Procedure(s):  LEFT BREAST LUMPECTOMY WITH NEEDLE LOCALIZATION (1100). general    Anesthesia Post Evaluation        Patient location during evaluation: PACU  Patient participation: complete - patient participated  Level of consciousness: awake and alert  Pain management: adequate  Airway patency: patent  Anesthetic complications: no  Cardiovascular status: acceptable  Respiratory status: acceptable  Hydration status: acceptable  Comments: I have seen and evaluated the patient and is ready for discharge. Sharon Dos Santos MD    Post anesthesia nausea and vomiting:  none      INITIAL Post-op Vital signs: No vitals data found for the desired time range.

## 2021-08-18 NOTE — PROGRESS NOTES
Patient tolerated left breast wire localization well with scant bleeding. Wire was taped in place and loosely covered with a gauze by RT. Patient was then transported via wheelchair to the ambulatory surgery area.

## 2021-08-18 NOTE — ANESTHESIA PREPROCEDURE EVALUATION
Anesthetic History     PONV          Review of Systems / Medical History  Patient summary reviewed, nursing notes reviewed and pertinent labs reviewed    Pulmonary  Within defined limits                 Neuro/Psych   Within defined limits           Cardiovascular    Hypertension                   GI/Hepatic/Renal  Within defined limits              Endo/Other    Diabetes: type 2    Arthritis     Other Findings              Physical Exam    Airway  Mallampati: II  TM Distance: > 6 cm  Neck ROM: normal range of motion   Mouth opening: Normal     Cardiovascular  Regular rate and rhythm,  S1 and S2 normal,  no murmur, click, rub, or gallop             Dental  No notable dental hx       Pulmonary  Breath sounds clear to auscultation               Abdominal  GI exam deferred       Other Findings            Anesthetic Plan    ASA: 2  Anesthesia type: general          Induction: Intravenous  Anesthetic plan and risks discussed with: Patient

## 2021-08-18 NOTE — BRIEF OP NOTE
Brief Postoperative Note    Patient: Ishmael Lora  YOB: 1945  MRN: [id number]    Date of Procedure: 8/18/2021     Pre-Op Diagnosis: DCIS OF LEFT BREAST, H/O LEFT BREAST CANCER    Post-Op Diagnosis: Same as preoperative diagnosis. Procedure(s):  LEFT BREAST LUMPECTOMY WITH NEEDLE LOCALIZATION (1100)    Surgeon(s):  Taylor Barker MD    Surgical Assistant: Surg Asst-1: Jeannie Alarcon    Anesthesia: General     Estimated Blood Loss (mL): Minimal    Complications: None    Specimens:   ID Type Source Tests Collected by Time Destination   1 : Left Breast Lumpectomy (SHort Double Stitch - Superior/ Long Double Lateral) Fresh Breast  Taylor Barker MD 8/18/2021 1336 Pathology   2 : Inferior Medial Margin Left Breast (Stitch Inner Anterior) Fresh Breast  Taylor Barker MD 8/18/2021 1346 Pathology        Implants: * No implants in log *    Drains: * No LDAs found *    Findings: spec radiograph positive calcifications.       Electronically Signed by Kari Summers MD on 0/83/7113 at 2:04 PM

## 2021-08-18 NOTE — OP NOTES
[address]   OPERATIVE REPORT    Name:  Timothy Art  MR#:  [id number]  :  1945  ACCOUNT #:  [de-identified]  DATE OF SERVICE:  2021    PREOPERATIVE DIAGNOSIS:  Ductal carcinoma in situ of the left breast.    POSTOPERATIVE DIAGNOSIS:  Ductal carcinoma in situ of the left breast.    PROCEDURE PERFORMED:  Left lumpectomy with needle localization. SURGEON:  Loreto Alfonso MD    ASSISTANT:  Mayur Carlson SA    ANESTHESIA:  General.    COMPLICATIONS:  None. SPECIMENS REMOVED:  Left breast tissue with margins. IMPLANTS:  None. ESTIMATED BLOOD LOSS:  Minimal.    INDICATIONS:  The patient is a 80-year-old female with biopsy-proven ductal carcinoma in situ. She is admitted for lumpectomy. PROCEDURE:  After needle localization in Radiology, the patient was brought to the operating room. After satisfactory induction of general LMA anesthesia, the patient was prepped and draped in sterile fashion. A circumareolar incision was made above the nipple-areolar complex and deepened through the subcutaneous tissue with Bovie cautery. The skin flap was raised superiorly to the area of the wire insertion and the wire was brought into the wound. A large volume lumpectomy was then performed including tissue along the wire course which included the biopsy clip, which had migrated superiorly to the biopsy cavity and the biopsy cavity itself which was excised. The specimen was oriented and sent to Radiology where a specimen radiograph was obtained which revealed the presence of calcifications within the specimen. Additional medial inferior margin was obtained and oriented and sent to Pathology. All dissection planes remained hemostatic with Bovie cautery. The wound was anesthetized with 0.5% Marcaine. Tissue advancement flaps were created over a distance of 8 x 4 cm medially and laterally for a total tissue advancement approximately 64 cm2.   Parenchymal flaps were then reapproximated with interrupted 3-0 Vicryl suture mobilizing the tissue to advance into the lumpectomy defect. After filling the lumpectomy defect, the subcutaneous tissue was reapproximated with interrupted 3-0 Vicryl and the skin closed with running subcuticular 4-0 Monocryl. The patient tolerated the procedure well. There were no complications. She was taken to the recovery room in stable condition.       Rebecca Armando MD      [id number]  D:  08/18/2021 14:11  T:  08/18/2021 16:47  JOB #:  [id number]  CC:  Iglesia Weldon MD

## 2021-08-18 NOTE — ROUTINE PROCESS
Patient: Msuhtaq Addison MRN: [id number]  SSN: [id number]   YOB: 1945  Age: 77 y.o. Sex: female     Patient is status post Procedure(s):  LEFT BREAST LUMPECTOMY WITH NEEDLE LOCALIZATION (1100).     Surgeon(s) and Role:     * Josselin Ventura MD - Primary    Local/Dose/Irrigation:                            Airway - Endotracheal Tube 08/18/21 Oral (Active)                   Dressing/Packing:  Incision 08/18/21 Breast Left-Dressing/Treatment: Skin glue (08/18/21 5220)    Splint/Cast:  ]    Other:

## 2021-08-20 RX ORDER — IBUPROFEN 800 MG/1
TABLET ORAL
Qty: 60 TABLET | Refills: 1 | Status: SHIPPED | OUTPATIENT
Start: 2021-08-20

## 2021-08-24 ENCOUNTER — TELEPHONE (OUTPATIENT)
Dept: SURGERY | Age: 76
End: 2021-08-24

## 2021-08-24 DIAGNOSIS — E11.9 TYPE 2 DIABETES MELLITUS WITHOUT COMPLICATION, WITHOUT LONG-TERM CURRENT USE OF INSULIN (HCC): ICD-10-CM

## 2021-08-24 RX ORDER — METFORMIN HYDROCHLORIDE 500 MG/1
500 TABLET ORAL 2 TIMES DAILY WITH MEALS
Qty: 180 TABLET | Refills: 3 | Status: SHIPPED | OUTPATIENT
Start: 2021-08-24 | End: 2022-04-11 | Stop reason: SDUPTHER

## 2021-08-24 NOTE — TELEPHONE ENCOUNTER
----- Message from Karthik Bhatia sent at 8/24/2021  8:54 AM EDT -----  Regarding: Dr. Efren Arroyo (if not patient):self      Relationship of caller (if not patient):self      Best contact number(s):[phone]      Name of medication and dosage if known:\"metFORMIN (GLUCOPHAGE) 500 mg tablet\"       Is patient out of this medication (yes/no):no      Pharmacy name:[facility]    Pharmacy listed in chart? (yes/no):yes  Pharmacy phone [phone]        Details to clarify the request:Pt advised has enough for this week and then will be completely out.       Karthik Bhatia

## 2021-08-26 ENCOUNTER — DOCUMENTATION ONLY (OUTPATIENT)
Dept: SURGERY | Age: 76
End: 2021-08-26

## 2021-09-01 ENCOUNTER — OFFICE VISIT (OUTPATIENT)
Dept: SURGERY | Age: 76
End: 2021-09-01
Payer: MEDICARE

## 2021-09-01 VITALS
SYSTOLIC BLOOD PRESSURE: 155 MMHG | HEIGHT: 63 IN | BODY MASS INDEX: 31.01 KG/M2 | DIASTOLIC BLOOD PRESSURE: 68 MMHG | HEART RATE: 88 BPM | WEIGHT: 175 LBS

## 2021-09-01 DIAGNOSIS — Z85.3 HISTORY OF LEFT BREAST CANCER: ICD-10-CM

## 2021-09-01 DIAGNOSIS — D05.12 DUCTAL CARCINOMA IN SITU (DCIS) OF LEFT BREAST: Primary | ICD-10-CM

## 2021-09-01 PROCEDURE — 99024 POSTOP FOLLOW-UP VISIT: CPT | Performed by: SURGERY

## 2021-09-01 NOTE — PROGRESS NOTES
HISTORY OF PRESENT ILLNESS  Cynthia Garland is a 77 y.o. female. HPI  ESTABLISHED patient here for post op visit for LEFT breast lumpectomy done 8/18/21. Patient is doing well with no problems. 06/24/21: LEFT breast bx, 3:00. PATH: DCIS, 5mm largest single glass slide measurement, papillary and cribriform, nuclear grade 2 with focal necrosis, ER+(100%). Clinical stage 0.    08/18/21: LEFT breast lumpectomy. PATH:  - Lumpectomy: Radial scar/complex sclerosing lesion with LCIS, intraductal papilloma, usual ductal hyperplasia, apocrine   metaplasia, and microcyst formation. No DCIS identified. - Inferior medial margin excision: DCIS, 25mm, nuclear grade 2, cribriform and papillary types with associated comedo-type necrosis and microcalcifications, negative margins, pathologic stage pTis pNX. Adjacent intraductal papilloma, usual ductal hyperplasia, apocrine metaplasia, and microcyst formation.  - DCISionRT pending.       Past Medical History:   Diagnosis Date    Arthritis     knee    Cancer ([facility] [address] 2003    melanoma -right lower extremity    Chronic pain     left knee & left shoulder    Diabetes ([address]     type 2    Hypertension     Nausea & vomiting     Screen for colon cancer 10/2/2014       Past Surgical History:   Procedure Laterality Date    HX BREAST BIOPSY Right     yrs ago    HX BREAST BIOPSY Left 06/2021    HX BREAST LUMPECTOMY Left 8/18/2021    LEFT BREAST LUMPECTOMY WITH NEEDLE LOCALIZATION (1100) performed by Ted Love MD at [facility]    HX CATARACT REMOVAL  2010    bilateral eyes    HX CHOLECYSTECTOMY      HX HYSTERECTOMY  1990    fibroid    HX KNEE ARTHROSCOPY  1999    left knee replacement    HX ORTHOPAEDIC      age 40- right foot    HX ORTHOPAEDIC  9/11/2015    left carpal tunnel    HX OTHER SURGICAL  2005    permanent teeth replacements    HX OTHER SURGICAL Right 2003    melonoma removed rt leg    HX ROTATOR CUFF REPAIR  09/28/2020    left    HX WISDOM TEETH EXTRACTION      [place] COLONOSCOPY FLX DX W/COLLJ SPEC WHEN PFRMD  2007    date and md unknown       Social History     Socioeconomic History    Marital status:      Spouse name: Not on file    Number of children: Not on file    Years of education: Not on file    Highest education level: Not on file   Occupational History    Not on file   Tobacco Use    Smoking status: Never Smoker    Smokeless tobacco: Never Used   Vaping Use    Vaping Use: Never used   Substance and Sexual Activity    Alcohol use: No     Alcohol/week: 0.0 standard drinks     Comment: RARE     Drug use: Yes     Types: Marijuana     Comment: edibles-every 2 weeks    Sexual activity: Yes     Partners: Male     Birth control/protection: None, Surgical   Other Topics Concern    Not on file   Social History Narrative    Not on file     Social Determinants of Health     Financial Resource Strain:     Difficulty of Paying Living Expenses:    Food Insecurity:     Worried About Running Out of Food in the Last Year:     Ran Out of Food in the Last Year:    Transportation Needs:     Lack of Transportation (Medical):      Lack of Transportation (Non-Medical):    Physical Activity:     Days of Exercise per Week:     Minutes of Exercise per Session:    Stress:     Feeling of Stress :    Social Connections:     Frequency of Communication with Friends and Family:     Frequency of Social Gatherings with Friends and Family:     Attends [employer] Services:     Active Member of Clubs or Organizations:     Attends Club or Organization Meetings:     Marital Status:    Intimate Partner Violence:     Fear of Current or Ex-Partner:     Emotionally Abused:     Physically Abused:     Sexually Abused:        Current Outpatient Medications on File Prior to Visit   Medication Sig Dispense Refill    rosuvastatin (CRESTOR) 5 mg tablet TAKE 1 TABLET BY MOUTH EVERY NIGHT AT BEDTIME 30 Tablet 5    metFORMIN (GLUCOPHAGE) 500 mg tablet Take 1 Tablet by mouth two (2) times daily (with meals). 180 Tablet 3    ibuprofen (MOTRIN) 800 mg tablet TAKE 1 TABLET BY MOUTH THREE TIMES DAILY AS NEEDED FOR PAIN 60 Tablet 1    IBUPROFEN PO Take  by mouth as needed.  glucose blood VI test strips (True Metrix Glucose Test Strip) strip USE TO TEST BLOOD SUGAR TWICE DAILY 200 Strip 5    fluticasone propionate (FLONASE) 50 mcg/actuation nasal spray SHAKE LIQUID AND USE 2 SPRAYS IN EACH NOSTRIL DAILY AS NEEDED FOR RHINITIS 16 g 3    amLODIPine (NORVASC) 5 mg tablet TAKE 1 TABLET BY MOUTH DAILY 90 Tab 3    hydroCHLOROthiazide (HYDRODIURIL) 12.5 mg tablet TAKE 1 TABLET BY MOUTH DAILY 90 Tab 3    losartan (COZAAR) 100 mg tablet TAKE 1 TABLET BY MOUTH DAILY 90 Tab 3    cholecalciferol (VITAMIN D3) (2,000 UNITS /50 MCG) cap capsule Take 2,000 Units by mouth daily.  Blood-Glucose Meter (TRUE METRIX AIR GLUCOSE METER) [facility] Use to check blood sugar twice a day, E11.9 1 Each 0    Blood Glucose Control, Low (TRUE METRIX LEVEL 1) soln Use as directed. E11.9 1 Each 3    albuterol (PROVENTIL HFA, VENTOLIN HFA, PROAIR HFA) 90 mcg/actuation inhaler Take 2 Puffs by inhalation every four (4) hours as needed for Wheezing. 1 Inhaler 11    aspirin delayed-release 81 mg tablet Take 1 Tab by mouth daily. 30 Tab 6    loratadine (CLARITIN) 10 mg tablet Take 1 tablet by mouth daily as needed for Allergies. 30 tablet 11    Lancets (PRODIGY TWIST TOP LANCET) misc Use to check blood glucose level twice a day and PRN. 200 Each 3    alcohol swabs (BD SINGLE USE SWABS REGULAR) padm Use to cleanse finger prior to checking glucose level. 200 Each 3    Omega-3-DHA-EPA-Fish Oil 1,000 (120-180) mg Cap Take 1 Cap by mouth two (2) times a week. No current facility-administered medications on file prior to visit. No Known Allergies    OB History    No obstetric history on file.       Obstetric Comments   Menarche 16, LMP unknown, # of children 0, age of 1st delivery N/A, Hysterectomy/oophorectomy No/No, Breast bx Yes, history of breast feeding No, BCP No, Hormone therapy No             ROS    Physical Exam  Exam conducted with a chaperone present. Cardiovascular:      Rate and Rhythm: Normal rate and regular rhythm. Heart sounds: Normal heart sounds. Pulmonary:      Breath sounds: Normal breath sounds. Chest:      Breasts: Breasts are symmetrical.         Right: Normal. No swelling, bleeding, inverted nipple, mass, nipple discharge, skin change or tenderness. Left: Normal. No swelling, bleeding, inverted nipple, mass, nipple discharge, skin change or tenderness. Lymphadenopathy:      Cervical:      Right cervical: No superficial, deep or posterior cervical adenopathy. Left cervical: No superficial, deep or posterior cervical adenopathy. Upper Body:      Right upper body: No supraclavicular or axillary adenopathy. Left upper body: No supraclavicular or axillary adenopathy. ASSESSMENT and PLAN    ICD-10-CM ICD-9-CM    1. Ductal carcinoma in situ (DCIS) of left breast  D05.12 233.0    2. History of left breast cancer  Z85.3 V10.3       Patient presents for f/u s/p LEFT breast lumpectomy on 08/18/21, and is doing well overall. Well healed incision s/p lumpectomy, no evidence of local recurrence. Moderate sized, asymptomatic post-op seroma, expected to resolve. Pt may return to her normal activities and gardening. Reviewed surgical PATH. DCISionRT results pending to help determine need for XRT; will call with results when they become available. F/U in 6 months with Carroll Salgado NP, after mammogram. This plan was reviewed with the patient and patient agrees. All questions were answered.     Written by Tristan Gusman, as dictated by Dr. Michael Velasquez MD.

## 2021-09-01 NOTE — PATIENT INSTRUCTIONS

## 2021-09-01 NOTE — PROGRESS NOTES
HISTORY OF PRESENT ILLNESS  Joey Talavera is a 77 y.o. female. HPI ESTABLISHED patient here for post op visit for LEFT breast lumpectomy done 8/18/21. Patient is doing well with no problems. 06/24/21: LEFT breast bx, 3:00. PATH: DCIS, 5mm largest single glass slide measurement, papillary and cribriform, nuclear grade 2 with focal necrosis, ER+(100%). Clinical stage 0.     08/18/21: LEFT breast lumpectomy. PATH:  · Lumpectomy: Radial scar/complex sclerosing lesion with LCIS, intraductal papilloma, usual ductal hyperplasia, apocrine   metaplasia, and microcyst formation. No DCIS identified. · Inferior medial margin excision: DCIS, 25mm, nuclear grade 2, cribriform and papillary types with associated comedo-type necrosis and microcalcifications, positive anterior margin, pathologic stage pTis pNX. Adjacent intraductal papilloma, usual ductal hyperplasia, apocrine metaplasia, and microcyst formation.   ROS    Physical Exam    ASSESSMENT and PLAN  {ASSESSMENT/PLAN:33079}

## 2021-09-13 DIAGNOSIS — Z91.09 ENVIRONMENTAL ALLERGIES: ICD-10-CM

## 2021-09-13 RX ORDER — FLUTICASONE PROPIONATE 50 MCG
SPRAY, SUSPENSION (ML) NASAL
Qty: 16 G | Refills: 3 | Status: SHIPPED | OUTPATIENT
Start: 2021-09-13

## 2021-09-29 ENCOUNTER — OFFICE VISIT (OUTPATIENT)
Dept: FAMILY MEDICINE CLINIC | Age: 76
End: 2021-09-29
Payer: MEDICARE

## 2021-09-29 VITALS
RESPIRATION RATE: 16 BRPM | DIASTOLIC BLOOD PRESSURE: 78 MMHG | HEART RATE: 73 BPM | WEIGHT: 172.6 LBS | HEIGHT: 63 IN | TEMPERATURE: 97.8 F | SYSTOLIC BLOOD PRESSURE: 148 MMHG | OXYGEN SATURATION: 100 % | BODY MASS INDEX: 30.58 KG/M2

## 2021-09-29 DIAGNOSIS — Z51.81 ENCOUNTER FOR MEDICATION MONITORING: ICD-10-CM

## 2021-09-29 DIAGNOSIS — E11.42 TYPE 2 DIABETES MELLITUS WITH DIABETIC POLYNEUROPATHY, WITHOUT LONG-TERM CURRENT USE OF INSULIN (HCC): ICD-10-CM

## 2021-09-29 DIAGNOSIS — I10 ESSENTIAL HYPERTENSION: ICD-10-CM

## 2021-09-29 DIAGNOSIS — Z85.3 HISTORY OF BREAST CANCER: ICD-10-CM

## 2021-09-29 DIAGNOSIS — E78.2 MIXED HYPERLIPIDEMIA: ICD-10-CM

## 2021-09-29 DIAGNOSIS — Z00.00 MEDICARE ANNUAL WELLNESS VISIT, SUBSEQUENT: Primary | ICD-10-CM

## 2021-09-29 DIAGNOSIS — E66.9 CLASS 1 OBESITY: ICD-10-CM

## 2021-09-29 DIAGNOSIS — Z23 NEEDS FLU SHOT: ICD-10-CM

## 2021-09-29 LAB
ALBUMIN SERPL-MCNC: 4.1 G/DL (ref 3.5–5)
ALBUMIN/GLOB SERPL: 1 {RATIO} (ref 1.1–2.2)
ALP SERPL-CCNC: 74 U/L (ref 45–117)
ALT SERPL-CCNC: 18 U/L (ref 12–78)
ANION GAP SERPL CALC-SCNC: 8 MMOL/L (ref 5–15)
AST SERPL-CCNC: 13 U/L (ref 15–37)
BILIRUB SERPL-MCNC: 0.5 MG/DL (ref 0.2–1)
BUN SERPL-MCNC: 15 MG/DL (ref 6–20)
BUN/CREAT SERPL: 23 (ref 12–20)
CALCIUM SERPL-MCNC: 9.7 MG/DL (ref 8.5–10.1)
CHLORIDE SERPL-SCNC: 104 MMOL/L (ref 97–108)
CHOLEST SERPL-MCNC: 230 MG/DL
CO2 SERPL-SCNC: 26 MMOL/L (ref 21–32)
CREAT SERPL-MCNC: 0.64 MG/DL (ref 0.55–1.02)
ERYTHROCYTE [DISTWIDTH] IN BLOOD BY AUTOMATED COUNT: 12.7 % (ref 11.5–14.5)
GLOBULIN SER CALC-MCNC: 4 G/DL (ref 2–4)
GLUCOSE POC: 143 MG/DL
GLUCOSE SERPL-MCNC: 135 MG/DL (ref 65–100)
HBA1C MFR BLD HPLC: 6.6 %
HCT VFR BLD AUTO: 43.3 % (ref 35–47)
HDLC SERPL-MCNC: 78 MG/DL
HDLC SERPL: 2.9 {RATIO} (ref 0–5)
HGB BLD-MCNC: 14.2 G/DL (ref 11.5–16)
LDLC SERPL CALC-MCNC: 134.2 MG/DL (ref 0–100)
MCH RBC QN AUTO: 31.2 PG (ref 26–34)
MCHC RBC AUTO-ENTMCNC: 32.8 G/DL (ref 30–36.5)
MCV RBC AUTO: 95.2 FL (ref 80–99)
NRBC # BLD: 0 K/UL (ref 0–0.01)
NRBC BLD-RTO: 0 PER 100 WBC
PLATELET # BLD AUTO: 255 K/UL (ref 150–400)
PMV BLD AUTO: 11.6 FL (ref 8.9–12.9)
POTASSIUM SERPL-SCNC: 3.6 MMOL/L (ref 3.5–5.1)
PROT SERPL-MCNC: 8.1 G/DL (ref 6.4–8.2)
RBC # BLD AUTO: 4.55 M/UL (ref 3.8–5.2)
SODIUM SERPL-SCNC: 138 MMOL/L (ref 136–145)
TRIGL SERPL-MCNC: 89 MG/DL (ref ?–150)
VLDLC SERPL CALC-MCNC: 17.8 MG/DL
WBC # BLD AUTO: 3.9 K/UL (ref 3.6–11)

## 2021-09-29 PROCEDURE — 1101F PT FALLS ASSESS-DOCD LE1/YR: CPT | Performed by: FAMILY MEDICINE

## 2021-09-29 PROCEDURE — 90694 VACC AIIV4 NO PRSRV 0.5ML IM: CPT | Performed by: FAMILY MEDICINE

## 2021-09-29 PROCEDURE — G8754 DIAS BP LESS 90: HCPCS | Performed by: FAMILY MEDICINE

## 2021-09-29 PROCEDURE — 3017F COLORECTAL CA SCREEN DOC REV: CPT | Performed by: FAMILY MEDICINE

## 2021-09-29 PROCEDURE — G8427 DOCREV CUR MEDS BY ELIG CLIN: HCPCS | Performed by: FAMILY MEDICINE

## 2021-09-29 PROCEDURE — G8536 NO DOC ELDER MAL SCRN: HCPCS | Performed by: FAMILY MEDICINE

## 2021-09-29 PROCEDURE — G8399 PT W/DXA RESULTS DOCUMENT: HCPCS | Performed by: FAMILY MEDICINE

## 2021-09-29 PROCEDURE — 99214 OFFICE O/P EST MOD 30 MIN: CPT | Performed by: FAMILY MEDICINE

## 2021-09-29 PROCEDURE — 3044F HG A1C LEVEL LT 7.0%: CPT | Performed by: FAMILY MEDICINE

## 2021-09-29 PROCEDURE — G0008 ADMIN INFLUENZA VIRUS VAC: HCPCS | Performed by: FAMILY MEDICINE

## 2021-09-29 PROCEDURE — G8510 SCR DEP NEG, NO PLAN REQD: HCPCS | Performed by: FAMILY MEDICINE

## 2021-09-29 PROCEDURE — G0439 PPPS, SUBSEQ VISIT: HCPCS | Performed by: FAMILY MEDICINE

## 2021-09-29 PROCEDURE — G8417 CALC BMI ABV UP PARAM F/U: HCPCS | Performed by: FAMILY MEDICINE

## 2021-09-29 PROCEDURE — 2022F DILAT RTA XM EVC RTNOPTHY: CPT | Performed by: FAMILY MEDICINE

## 2021-09-29 PROCEDURE — 83036 HEMOGLOBIN GLYCOSYLATED A1C: CPT | Performed by: FAMILY MEDICINE

## 2021-09-29 PROCEDURE — 1090F PRES/ABSN URINE INCON ASSESS: CPT | Performed by: FAMILY MEDICINE

## 2021-09-29 PROCEDURE — 82947 ASSAY GLUCOSE BLOOD QUANT: CPT | Performed by: FAMILY MEDICINE

## 2021-09-29 PROCEDURE — G8753 SYS BP > OR = 140: HCPCS | Performed by: FAMILY MEDICINE

## 2021-09-29 RX ORDER — AMLODIPINE BESYLATE 5 MG/1
10 TABLET ORAL DAILY
Qty: 180 TABLET | Refills: 3 | Status: SHIPPED | OUTPATIENT
Start: 2021-09-29 | End: 2022-04-18

## 2021-09-29 RX ORDER — AMLODIPINE BESYLATE 5 MG/1
10 TABLET ORAL DAILY
Qty: 180 TABLET | Refills: 3 | Status: SHIPPED | OUTPATIENT
Start: 2021-09-29 | End: 2021-09-29 | Stop reason: SDUPTHER

## 2021-09-29 NOTE — PROGRESS NOTES
HISTORY OF PRESENT ILLNESS  Rock Bourne is a 77 y.o. female. HPI   Follow up on chronic medical problems. Doing the precautionary measures at home to reduce risks of exposure COVID19. Also wearing mask when she is going out. No known sick contacts or known exposure to Sammi Mcmillan. Had lumpectomy for left breast cancer and doing well. HTN follow up:  Compliant w/ meds, low salt diet, and exercise. Has home bp monitorin-160/70-80s  No swelling, headache or dizziness. No chest pain, SOB, palpitations. Otherwise feeling well since the last visit. DM type II follow up:  Compliant w/ meds, diabetic diet, and exercise. Obtains home glucose monitoring averaging 100-140s. Checks BS BID on most days and prn. Pt does not have BS log at visit today. No Rf needed for today. Tingling sensation in the foot and toes which comes and goes. Denies polyuria and polydipsia. No blurred vision. No significant weight changes. Hypercholesterolemia follow up:  Compliant w/ meds, low fat, low cholesterol diet. Stopped crestor d/t muscle aching. Exercising some. No abdominal pain, no skin discoloration. Patient fasting today. HM:   Mammogram 2021   Bone density 3/19/2020   Eye exam 2020   Colonoscopy 3/4/2016 by Dr. Sandy Nicole- repeat in 10 years.     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 180 Tablet 3    ibuprofen (MOTRIN) 800 mg tablet TAKE 1 TABLET BY MOUTH THREE TIMES DAILY AS NEEDED FOR PAIN 60 Tablet 1    IBUPROFEN PO Take  by mouth as needed.  glucose blood VI test strips (True Metrix Glucose Test Strip) strip USE TO TEST BLOOD SUGAR TWICE DAILY 200 Strip 5    amLODIPine (NORVASC) 5 mg tablet TAKE 1 TABLET BY MOUTH DAILY 90 Tab 3    hydroCHLOROthiazide (HYDRODIURIL) 12.5 mg tablet TAKE 1 TABLET BY MOUTH DAILY 90 Tab 3    losartan (COZAAR) 100 mg tablet TAKE 1 TABLET BY MOUTH DAILY 90 Tab 3    cholecalciferol (VITAMIN D3) (2,000 UNITS /50 MCG) cap capsule Take 2,000 Units by mouth daily.  Blood-Glucose Meter (TRUE METRIX AIR GLUCOSE METER) misc Use to check blood sugar twice a day, E11.9 1 Each 0    Blood Glucose Control, Low (TRUE METRIX LEVEL 1) soln Use as directed. E11.9 1 Each 3    albuterol (PROVENTIL HFA, VENTOLIN HFA, PROAIR HFA) 90 mcg/actuation inhaler Take 2 Puffs by inhalation every four (4) hours as needed for Wheezing. 1 Inhaler 11    aspirin delayed-release 81 mg tablet Take 1 Tab by mouth daily. 30 Tab 6    loratadine (CLARITIN) 10 mg tablet Take 1 tablet by mouth daily as needed for Allergies. 30 tablet 11    Lancets (PRODIGY TWIST TOP LANCET) misc Use to check blood glucose level twice a day and PRN. 200 Each 3    alcohol swabs (BD SINGLE USE SWABS REGULAR) padm Use to cleanse finger prior to checking glucose level. 200 Each 3    Omega-3-DHA-EPA-Fish Oil 1,000 (120-180) mg Cap Take 1 Cap by mouth two (2) times a week.          No Known Allergies      Past Medical History:   Diagnosis Date    Arthritis     knee    Cancer ([facility] [address] 2003    melanoma -right lower extremity    Chronic pain     left knee & left shoulder    Diabetes ([facility] [address]     type 2    Hypertension     Nausea & vomiting     Screen for colon cancer 10/2/2014         Past Surgical History:   Procedure Laterality Date    HX BREAST BIOPSY Right     yrs ago    HX BREAST BIOPSY Left 06/2021    HX BREAST LUMPECTOMY Left 8/18/2021    LEFT BREAST LUMPECTOMY WITH NEEDLE LOCALIZATION (1100) performed by Daniel Molina [facility] at [facility]    HX CATARACT REMOVAL  2010    bilateral eyes    HX CHOLECYSTECTOMY      HX HYSTERECTOMY  1990    fibroid    HX KNEE ARTHROSCOPY  1999    left knee replacement    HX ORTHOPAEDIC      age 40- right foot    HX ORTHOPAEDIC  9/11/2015    left carpal tunnel    HX OTHER SURGICAL  2005    permanent teeth replacements    HX OTHER SURGICAL Right 2003    melonoma removed rt leg    HX ROTATOR CUFF REPAIR  09/28/2020    left    HX WISDOM TEETH EXTRACTION      [place] COLONOSCOPY FLX DX W/COLLJ SPEC WHEN PFRMD  2007    date and md unknown         Family History   Problem Relation Age of Onset    No Known Problems Mother     No Known Problems Father     Thyroid Disease Sister     Anesth Problems Neg Hx        Social History     Tobacco Use    Smoking status: Never Smoker    Smokeless tobacco: Never Used   Substance Use Topics    Alcohol use: No     Alcohol/week: 0.0 standard drinks     Comment: RARE         Lab Results   Component Value Date/Time    WBC 3.8 08/11/2021 08:47 AM    HGB 12.4 08/11/2021 08:47 AM    Hemoglobin (POC) 15.3 12/27/2009 07:16 AM    HCT 37.6 08/11/2021 08:47 AM    Hematocrit (POC) 45 12/27/2009 07:16 AM    PLATELET 882 88/12/8794 08:47 AM    MCV 93.8 08/11/2021 08:47 AM     Lab Results   Component Value Date/Time    Cholesterol, total 229 (H) 06/22/2021 12:38 PM    HDL Cholesterol 72 06/22/2021 12:38 PM    LDL, calculated 137.6 (H) 06/22/2021 12:38 PM    Triglyceride 97 06/22/2021 12:38 PM    CHOL/HDL Ratio 3.2 06/22/2021 12:38 PM     Lab Results   Component Value Date/Time    TSH 2.220 09/05/2017 09:57 AM      Lab Results   Component Value Date/Time    Sodium 140 08/11/2021 08:47 AM    Potassium 3.8 08/11/2021 08:47 AM Chloride 103 08/11/2021 08:47 AM    CO2 30 08/11/2021 08:47 AM    Anion gap 7 08/11/2021 08:47 AM    Glucose 165 (H) 08/11/2021 08:47 AM    BUN 13 08/11/2021 08:47 AM    Creatinine 0.50 (L) 08/11/2021 08:47 AM    BUN/Creatinine ratio 26 (H) 08/11/2021 08:47 AM    GFR est AA >60 08/11/2021 08:47 AM    GFR est non-AA >60 08/11/2021 08:47 AM    Calcium 9.3 08/11/2021 08:47 AM    Bilirubin, total 0.5 04/28/2021 11:56 AM    ALT (SGPT) 16 04/28/2021 11:56 AM    Alk. phosphatase 73 04/28/2021 11:56 AM    Protein, total 7.4 04/28/2021 11:56 AM    Albumin 4.3 04/28/2021 11:56 AM    Globulin 3.1 04/28/2021 11:56 AM    A-G Ratio 1.4 04/28/2021 11:56 AM      Lab Results   Component Value Date/Time    Hemoglobin A1c 6.9 (H) 08/11/2021 08:47 AM    Hemoglobin A1c (POC) 7.1 06/22/2021 12:13 PM         Review of Systems   Constitutional: Negative for malaise/fatigue. HENT: Negative for congestion. Eyes: Negative for blurred vision. Respiratory: Negative for cough and shortness of breath. Cardiovascular: Negative for chest pain, palpitations and leg swelling. Gastrointestinal: Negative for abdominal pain, constipation and heartburn. Genitourinary: Negative for dysuria, frequency and urgency. Musculoskeletal: Negative for back pain and joint pain. Neurological: Negative for dizziness, tingling and headaches. Endo/Heme/Allergies: Negative for environmental allergies. Psychiatric/Behavioral: Negative for depression. The patient does not have insomnia. Physical Exam  Vitals and nursing note reviewed. Constitutional:       Appearance: Normal appearance. She is well-developed.       Comments: BP (!) 148/78   Pulse 73   Temp 97.8 °F (36.6 °C) (Oral)   Resp 16   Ht 5' 2.5\" (1.588 m)   Wt 172 lb 9.6 oz (78.3 kg)   LMP 05/10/1990   SpO2 100%   BMI 31.07 kg/m²      HENT:      Right Ear: Tympanic membrane and ear canal normal.      Left Ear: Tympanic membrane and ear canal normal.      Nose: No mucosal edema.   Neck:      Thyroid: No thyromegaly. Vascular: No carotid bruit. Cardiovascular:      Rate and Rhythm: Normal rate and regular rhythm. Pulses: Normal pulses. Heart sounds: Normal heart sounds. No gallop. Pulmonary:      Effort: Pulmonary effort is normal.      Breath sounds: Normal breath sounds. Abdominal:      General: Bowel sounds are normal.      Palpations: Abdomen is soft. There is no mass. Tenderness: There is no abdominal tenderness. Genitourinary:     Rectum: Guaiac result negative. Musculoskeletal:         General: No swelling. Normal range of motion. Cervical back: Normal range of motion and neck supple. Right lower leg: No edema. Left lower leg: No edema. Comments: Foot exam done . Normal sensation to PP, LT and vibration. Sensation decreased in the right foot to microfilament testing. Pulses intact. No swelling. No skin lesions or sores noted. No tinea present. Lymphadenopathy:      Cervical: No cervical adenopathy. Skin:     General: Skin is warm and dry. Neurological:      General: No focal deficit present. Mental Status: She is alert and oriented to person, place, and time. Psychiatric:         Mood and Affect: Mood normal.         ASSESSMENT and PLAN  Diagnoses and all orders for this visit:    1. Medicare annual wellness visit, subsequent    2. Essential hypertension  -     Increase amLODIPine (NORVASC) 5 mg tablet; Take 2 Tablets by mouth daily. Discussed sodium restriction, high k rich diet, maintaining ideal body weight and regular exercise program such as daily walking 30 min perday 4-5 times per week, as physiologic means to achieve blood pressure control. Medication compliance advised. 3. Type 2 diabetes mellitus with diabetic polyneuropathy, without long-term current use of insulin (HCC)  -     AMB POC HEMOGLOBIN A1C  -     AMB POC GLUCOSE, QUANTITATIVE, BLOOD    4.  Mixed hyperlipidemia  Will watch cholesterol off of medication for now. -     LIPID PANEL; Future    5. History of breast cancer  As per breast surgeon. Did not nee chemo or radiation. Doing well. 6. Encounter for medication monitoring  -     METABOLIC PANEL, COMPREHENSIVE; Future  -     CBC W/O DIFF; Future    7. Class 1 obesity  I have reviewed/discussed the above normal BMI with the patient. I have recommended the following interventions: dietary management education, guidance, and counseling . Follow-up and Dispositions    · Return in about 6 weeks (around 11/10/2021). reviewed diet, exercise and weight control  cardiovascular risk and specific lipid/LDL goals reviewed  reviewed medications and side effects in detail  specific diabetic recommendations: low cholesterol diet, weight control and daily exercise discussed, all medications, side effects and compliance discussed carefully, foot care discussed and Podiatry visits discussed, annual eye examinations at Ophthalmology discussed and glycohemoglobin and other lab monitoring discussed     I have discussed diagnosis listed in this note with pt and/or family. I have discussed treatment plans and options and the risk/benefit analysis of those options, including safe use of medications and possible medication side effects. Through the use of shared decision making we have agreed to the above plan. The patient has received an after-visit summary and questions were answered concerning future plans and follow up. Advise pt of any urgent changes then to proceed to the ER.

## 2021-09-29 NOTE — PROGRESS NOTES
Chief Complaint   Patient presents with   [facility]     Medicare Wellness       Mammogram 5/19/2021    Bone density 3/19/2020      Eye exam 12/17/2020    Colonoscopy 3/4/2016 by Dr. Evelina Garza- sania in 10 years. Verbal order received by Dr. Jenelle Sargent dose flu vaccine IM. Pt received high dose flu vaccine IM in left deltoid without any difficulty. 1. Have you been to the ER, urgent care clinic since your last visit? Hospitalized since your last visit? Yes, 8/18/2021 breast lumpectomy    2. Have you seen or consulted any other health care providers outside of the [address] [place] [address] since your last visit? Include any pap smears or colon screening.  no

## 2021-09-29 NOTE — PROGRESS NOTES
This is a Subsequent Medicare Annual Wellness Exam (AWV) (Performed 12 months after IPPE or effective date of Medicare Part B enrollment)    I have reviewed the patient's medical history in detail and updated the computerized patient record. History     Patient Active Problem List   Diagnosis Code    Right knee DJD M17.11    Arthritis M19.90    Chronic pain G89.29    Hyperlipidemia E78.5    Hypovitaminosis D E55.9    Type 2 diabetes mellitus without complication (HCC) U59.9    Essential hypertension I10    Encounter for medication monitoring Z51.81    Ductal carcinoma in situ (DCIS) of left breast D05.12       Current Outpatient Medications   Medication Sig Dispense Refill    fluticasone propionate (FLONASE) 50 mcg/actuation nasal spray SHAKE LIQUID AND USE 2 SPRAYS IN EACH NOSTRIL DAILY AS NEEDED FOR RHINITIS 16 g 3    rosuvastatin (CRESTOR) 5 mg tablet TAKE 1 TABLET BY MOUTH EVERY NIGHT AT BEDTIME 30 Tablet 5    metFORMIN (GLUCOPHAGE) 500 mg tablet Take 1 Tablet by mouth two (2) times daily (with meals). 180 Tablet 3    ibuprofen (MOTRIN) 800 mg tablet TAKE 1 TABLET BY MOUTH THREE TIMES DAILY AS NEEDED FOR PAIN 60 Tablet 1    IBUPROFEN PO Take  by mouth as needed.  glucose blood VI test strips (True Metrix Glucose Test Strip) strip USE TO TEST BLOOD SUGAR TWICE DAILY 200 Strip 5    amLODIPine (NORVASC) 5 mg tablet TAKE 1 TABLET BY MOUTH DAILY 90 Tab 3    hydroCHLOROthiazide (HYDRODIURIL) 12.5 mg tablet TAKE 1 TABLET BY MOUTH DAILY 90 Tab 3    losartan (COZAAR) 100 mg tablet TAKE 1 TABLET BY MOUTH DAILY 90 Tab 3    cholecalciferol (VITAMIN D3) (2,000 UNITS /50 MCG) cap capsule Take 2,000 Units by mouth daily.  Blood-Glucose Meter (TRUE METRIX AIR GLUCOSE METER) [facility] Use to check blood sugar twice a day, E11.9 1 Each 0    Blood Glucose Control, Low (TRUE METRIX LEVEL 1) soln Use as directed. E11.9 1 Each 3    albuterol (PROVENTIL HFA, VENTOLIN HFA, PROAIR HFA) 90 mcg/actuation inhaler Take 2 Puffs by inhalation every four (4) hours as needed for Wheezing. 1 Inhaler 11    aspirin delayed-release 81 mg tablet Take 1 Tab by mouth daily. 30 Tab 6    loratadine (CLARITIN) 10 mg tablet Take 1 tablet by mouth daily as needed for Allergies. 30 tablet 11    Lancets (PRODIGY TWIST TOP LANCET) misc Use to check blood glucose level twice a day and PRN. 200 Each 3    alcohol swabs (BD SINGLE USE SWABS REGULAR) padm Use to cleanse finger prior to checking glucose level. 200 Each 3    Omega-3-DHA-EPA-Fish Oil 1,000 (120-180) mg Cap Take 1 Cap by mouth two (2) times a week.          No Known Allergies    Past Medical History:   Diagnosis Date    Arthritis     knee    Cancer ([facility] [address] 2003    melanoma -right lower extremity    Chronic pain     left knee & left shoulder    Diabetes ([facility] [address]     type 2    Hypertension     Nausea & vomiting     Screen for colon cancer 10/2/2014       Past Surgical History:   Procedure Laterality Date    HX BREAST BIOPSY Right     yrs ago    HX BREAST BIOPSY Left 06/2021    HX BREAST LUMPECTOMY Left 8/18/2021    LEFT BREAST LUMPECTOMY WITH NEEDLE LOCALIZATION (1100) performed by Gabe Stanton MD at [facility]ORY [facility]    HX CATARACT REMOVAL  2010    bilateral eyes    HX CHOLECYSTECTOMY      HX HYSTERECTOMY  1990    fibroid    HX KNEE ARTHROSCOPY  1999    left knee replacement    HX ORTHOPAEDIC      age 40- right foot    HX ORTHOPAEDIC  9/11/2015    left carpal tunnel    HX OTHER SURGICAL  2005    permanent teeth replacements    HX OTHER SURGICAL Right 2003    melonoma removed rt leg    HX ROTATOR CUFF REPAIR  09/28/2020    left    HX WISDOM TEETH EXTRACTION      [place] COLONOSCOPY FLX DX W/COLLJ SPEC WHEN PFRMD  2007    date and md unknown       Family History   Problem Relation Age of Onset    No Known Problems Mother     No Known Problems Father     Thyroid Disease Sister     Anesth Problems Neg Hx        Social History     Tobacco Use    Smoking status: Never Smoker    Smokeless tobacco: Never Used   Substance Use Topics    Alcohol use: No     Alcohol/week: 0.0 standard drinks     Comment: RARE          Depression Risk Factor Screening:     PHQ over the last two weeks    Little interest or pleasure in doing things Not at all   Feeling down, depressed or hopeless Not at all   Total Score PHQ 2 0     Alcohol Risk Factor Screening: You do not drink alcohol or very rarely. Functional Ability and Level of Safety:   Hearing Loss  Hearing is good. Activities of Daily Living  The home contains: discussed safety equipment. Patient does total self care    Fall RiskFall Risk Assessment, last 12 mths    Able to walk? Yes   Fall in past 12 months? No     Functional Ability:   Does the patient exhibit a steady gait? yes    How long did it take the patient to get up and walk from a sitting position? seconds    Is the patient self reliant? (ie can do own laundry, meals, household chores)  yes   Does the patient handle his/her own medications? yes   Does the patient handle his/her own money? yes   Is the patients home safe (ie good lighting, handrails on stairs and bath, etc.)? yes   Did you notice or did patient express any hearing difficulties? no   Did you notice or did patient express any vision difficulties? no        Advance Care Planning:   Patient was offered the opportunity to discuss advance care planning:  yes    Does patient have an Advance Directive:  no   If no, did you provide information on Caring Connections?   yes      Abuse Screen  Patient is not abused    Cognitive Screening   Evaluation of Cognitive Function:  Has your family/caregiver stated any concerns about your memory: no      Patient Care Team   Patient Care Team:  Keshawn Olson MD as PCP - General    Assessment/Plan   Education and counseling provided:  Are appropriate based on today's review and evaluation  End-of-Life planning (with patient's consent)    ASSESSMENT and PLAN    Medicare Annual Wellness  Continue current treatment plan. Continue annual follow up. I have discussed diagnosis listed in this note with pt and/or family. I have discussed treatment plans and options and the risk/benefit analysis of those options, including safe use of medications and possible medication side effects. Through the use of shared decision making we have agreed to the above plan. The patient has received an after-visit summary and questions were answered concerning future plans and follow up. Advise pt of any urgent changes then to proceed to the ER.

## 2021-09-30 ENCOUNTER — TELEPHONE (OUTPATIENT)
Dept: FAMILY MEDICINE CLINIC | Age: 76
End: 2021-09-30

## 2021-09-30 NOTE — TELEPHONE ENCOUNTER
Dr. Siddhartha Gamboa called patient and reinforced dosing change in norvasc to 5 mg, 2 tablets daily. Patient confirmed understanding and verified AVS instructions.

## 2021-09-30 NOTE — TELEPHONE ENCOUNTER
Patient states that she has forgotten the blood pressure pill  told her to double up on please give her a call @ [phone]

## 2021-11-10 ENCOUNTER — OFFICE VISIT (OUTPATIENT)
Dept: FAMILY MEDICINE CLINIC | Age: 76
End: 2021-11-10
Payer: MEDICARE

## 2021-11-10 VITALS
DIASTOLIC BLOOD PRESSURE: 80 MMHG | OXYGEN SATURATION: 100 % | BODY MASS INDEX: 30.58 KG/M2 | HEART RATE: 65 BPM | WEIGHT: 172.6 LBS | TEMPERATURE: 97.5 F | SYSTOLIC BLOOD PRESSURE: 136 MMHG | HEIGHT: 63 IN | RESPIRATION RATE: 16 BRPM

## 2021-11-10 DIAGNOSIS — I10 ESSENTIAL HYPERTENSION: Primary | ICD-10-CM

## 2021-11-10 DIAGNOSIS — Z51.81 ENCOUNTER FOR MEDICATION MONITORING: ICD-10-CM

## 2021-11-10 PROCEDURE — G8752 SYS BP LESS 140: HCPCS | Performed by: FAMILY MEDICINE

## 2021-11-10 PROCEDURE — G8510 SCR DEP NEG, NO PLAN REQD: HCPCS | Performed by: FAMILY MEDICINE

## 2021-11-10 PROCEDURE — 1101F PT FALLS ASSESS-DOCD LE1/YR: CPT | Performed by: FAMILY MEDICINE

## 2021-11-10 PROCEDURE — G8536 NO DOC ELDER MAL SCRN: HCPCS | Performed by: FAMILY MEDICINE

## 2021-11-10 PROCEDURE — 3017F COLORECTAL CA SCREEN DOC REV: CPT | Performed by: FAMILY MEDICINE

## 2021-11-10 PROCEDURE — G8417 CALC BMI ABV UP PARAM F/U: HCPCS | Performed by: FAMILY MEDICINE

## 2021-11-10 PROCEDURE — G8399 PT W/DXA RESULTS DOCUMENT: HCPCS | Performed by: FAMILY MEDICINE

## 2021-11-10 PROCEDURE — 1090F PRES/ABSN URINE INCON ASSESS: CPT | Performed by: FAMILY MEDICINE

## 2021-11-10 PROCEDURE — G8754 DIAS BP LESS 90: HCPCS | Performed by: FAMILY MEDICINE

## 2021-11-10 PROCEDURE — 99212 OFFICE O/P EST SF 10 MIN: CPT | Performed by: FAMILY MEDICINE

## 2021-11-10 PROCEDURE — G8427 DOCREV CUR MEDS BY ELIG CLIN: HCPCS | Performed by: FAMILY MEDICINE

## 2021-11-10 RX ORDER — HYDROCHLOROTHIAZIDE 12.5 MG/1
25 TABLET ORAL DAILY
Qty: 180 TABLET | Refills: 3 | Status: SHIPPED | OUTPATIENT
Start: 2021-11-10 | End: 2022-02-11 | Stop reason: SDUPTHER

## 2021-11-10 RX ORDER — HYDROCHLOROTHIAZIDE 12.5 MG/1
25 TABLET ORAL DAILY
Qty: 180 TABLET | Refills: 3 | Status: SHIPPED | OUTPATIENT
Start: 2021-11-10 | End: 2021-11-10 | Stop reason: SDUPTHER

## 2021-11-10 NOTE — PROGRESS NOTES
Chief Complaint   Patient presents with    Hypertension     6 week follow up             1. Have you been to the ER, urgent care clinic since your last visit? Hospitalized since your last visit?no    2. Have you seen or consulted any other health care providers outside of the [address] [place] [address] since your last visit? Include any pap smears or colon screening.  no

## 2021-11-10 NOTE — PROGRESS NOTES
HISTORY OF PRESENT ILLNESS  Reji Ortiz is a 77 y.o. female. HPI   Follow up on blood pressure. Doing the precautionary measures at home to reduce risks of exposure COVID19. Also wearing mask when she is going out. No known sick contacts or known exposure to [address]. Had lumpectomy for left breast cancer and doing well. HTN follow up:  Compliant w/ meds, low salt diet, and some exercise. Has home bp monitorin-160s still/70-80s. Pt has BP log. No swelling, headache or dizziness. No chest pain, SOB, palpitations. Otherwise feeling well since the last visit. Patient Active Problem List   Diagnosis Code    Right knee DJD M17.11    Arthritis M19.90    Chronic pain G89.29    Hyperlipidemia E78.5    Hypovitaminosis D E55.9    Type 2 diabetes mellitus without complication ([facility] [address] G73.9    Essential hypertension I10    Encounter for medication monitoring Z51.81    Ductal carcinoma in situ (DCIS) of left breast D05.12       Current Outpatient Medications   Medication Sig Dispense Refill    amLODIPine (NORVASC) 5 mg tablet Take 2 Tablets by mouth daily. 180 Tablet 3    fluticasone propionate (FLONASE) 50 mcg/actuation nasal spray SHAKE LIQUID AND USE 2 SPRAYS IN EACH NOSTRIL DAILY AS NEEDED FOR RHINITIS 16 g 3    metFORMIN (GLUCOPHAGE) 500 mg tablet Take 1 Tablet by mouth two (2) times daily (with meals). 180 Tablet 3    ibuprofen (MOTRIN) 800 mg tablet TAKE 1 TABLET BY MOUTH THREE TIMES DAILY AS NEEDED FOR PAIN 60 Tablet 1    glucose blood VI test strips (True Metrix Glucose Test Strip) strip USE TO TEST BLOOD SUGAR TWICE DAILY 200 Strip 5    hydroCHLOROthiazide (HYDRODIURIL) 12.5 mg tablet TAKE 1 TABLET BY MOUTH DAILY 90 Tab 3    losartan (COZAAR) 100 mg tablet TAKE 1 TABLET BY MOUTH DAILY 90 Tab 3    cholecalciferol (VITAMIN D3) (2,000 UNITS /50 MCG) cap capsule Take 2,000 Units by mouth daily.       Blood-Glucose Meter (TRUE METRIX AIR GLUCOSE METER) [facility] Use to check blood sugar twice a day, E11.9 1 Each 0    Blood Glucose Control, Low (TRUE METRIX LEVEL 1) soln Use as directed. E11.9 1 Each 3    albuterol (PROVENTIL HFA, VENTOLIN HFA, PROAIR HFA) 90 mcg/actuation inhaler Take 2 Puffs by inhalation every four (4) hours as needed for Wheezing. 1 Inhaler 11    aspirin delayed-release 81 mg tablet Take 1 Tab by mouth daily. 30 Tab 6    loratadine (CLARITIN) 10 mg tablet Take 1 tablet by mouth daily as needed for Allergies. 30 tablet 11    Lancets (PRODIGY TWIST TOP LANCET) misc Use to check blood glucose level twice a day and PRN. 200 Each 3    alcohol swabs (BD SINGLE USE SWABS REGULAR) padm Use to cleanse finger prior to checking glucose level. 200 Each 3    Omega-3-DHA-EPA-Fish Oil 1,000 (120-180) mg Cap Take 1 Cap by mouth two (2) times a week.          Allergies   Allergen Reactions    Crestor [Rosuvastatin] Myalgia       Past Medical History:   Diagnosis Date    Arthritis     knee    Cancer ([address] 2003    melanoma -right lower extremity    Chronic pain     left knee & left shoulder    Diabetes ([address]     type 2    Hypertension     Nausea & vomiting     Screen for colon cancer 10/2/2014       Past Surgical History:   Procedure Laterality Date    HX BREAST BIOPSY Right     yrs ago    HX BREAST BIOPSY Left 06/2021    HX BREAST LUMPECTOMY Left 8/18/2021    LEFT BREAST LUMPECTOMY WITH NEEDLE LOCALIZATION (1100) performed by Fredy Mendez MD at [facility] AM[facility]    HX CATARACT REMOVAL  2010    bilateral eyes    HX CHOLECYSTECTOMY      HX HYSTERECTOMY  1990    fibroid    HX KNEE ARTHROSCOPY  1999    left knee replacement    HX ORTHOPAEDIC      age 40- right foot    HX ORTHOPAEDIC  9/11/2015    left carpal tunnel    HX OTHER SURGICAL  2005    permanent teeth replacements    HX OTHER SURGICAL Right 2003    melonoma removed rt leg    HX ROTATOR CUFF REPAIR  09/28/2020    left    HX WISDOM TEETH EXTRACTION      AK COLONOSCOPY FLX DX W/COLLJ SPEC WHEN PFRMD 2007    date and md unknown       Family History   Problem Relation Age of Onset    No Known Problems Mother     No Known Problems Father     Thyroid Disease Sister     Anesth Problems Neg Hx        Social History     Tobacco Use    Smoking status: Never Smoker    Smokeless tobacco: Never Used   Substance Use Topics    Alcohol use: No     Alcohol/week: 0.0 standard drinks     Comment: RARE         Lab Results   Component Value Date/Time    WBC 3.9 09/29/2021 11:27 AM    HGB 14.2 09/29/2021 11:27 AM    Hemoglobin (POC) 15.3 12/27/2009 07:16 AM    HCT 43.3 09/29/2021 11:27 AM    Hematocrit (POC) 45 12/27/2009 07:16 AM    PLATELET 805 65/80/1557 11:27 AM    MCV 95.2 09/29/2021 11:27 AM     Lab Results   Component Value Date/Time    Cholesterol, total 230 (H) 09/29/2021 11:27 AM    HDL Cholesterol 78 09/29/2021 11:27 AM    LDL, calculated 134.2 (H) 09/29/2021 11:27 AM    Triglyceride 89 09/29/2021 11:27 AM    CHOL/HDL Ratio 2.9 09/29/2021 11:27 AM     Lab Results   Component Value Date/Time    TSH 2.220 09/05/2017 09:57 AM      Lab Results   Component Value Date/Time    Sodium 138 09/29/2021 11:27 AM    Potassium 3.6 09/29/2021 11:27 AM    Chloride 104 09/29/2021 11:27 AM    CO2 26 09/29/2021 11:27 AM    Anion gap 8 09/29/2021 11:27 AM    Glucose 135 (H) 09/29/2021 11:27 AM    BUN 15 09/29/2021 11:27 AM    Creatinine 0.64 09/29/2021 11:27 AM    BUN/Creatinine ratio 23 (H) 09/29/2021 11:27 AM    GFR est AA >60 09/29/2021 11:27 AM    GFR est non-AA >60 09/29/2021 11:27 AM    Calcium 9.7 09/29/2021 11:27 AM    Bilirubin, total 0.5 09/29/2021 11:27 AM    ALT (SGPT) 18 09/29/2021 11:27 AM    Alk.  phosphatase 74 09/29/2021 11:27 AM    Protein, total 8.1 09/29/2021 11:27 AM    Albumin 4.1 09/29/2021 11:27 AM    Globulin 4.0 09/29/2021 11:27 AM    A-G Ratio 1.0 (L) 09/29/2021 11:27 AM      Lab Results   Component Value Date/Time    Hemoglobin A1c 6.9 (H) 08/11/2021 08:47 AM    Hemoglobin A1c (POC) 6.6 09/29/2021 11:03 AM Review of Systems   Constitutional: Negative for malaise/fatigue. HENT: Negative for congestion. Eyes: Negative for blurred vision. Respiratory: Negative for cough and shortness of breath. Cardiovascular: Negative for chest pain, palpitations and leg swelling. Gastrointestinal: Negative for abdominal pain, constipation and heartburn. Genitourinary: Negative for dysuria, frequency and urgency. Musculoskeletal: Negative for back pain and joint pain. Neurological: Negative for dizziness, tingling and headaches. Endo/Heme/Allergies: Negative for environmental allergies. Psychiatric/Behavioral: Negative for depression. The patient does not have insomnia. Physical Exam  Vitals and nursing note reviewed. Constitutional:       Appearance: Normal appearance. She is well-developed. Comments: /80   Pulse 65   Temp 97.5 °F (36.4 °C) (Oral)   Resp 16   Ht 5' 2.5\" (1.588 m)   Wt 172 lb 9.6 oz (78.3 kg)   LMP 05/10/1990   SpO2 100%   BMI 31.07 kg/m²      Neck:      Thyroid: No thyromegaly. Cardiovascular:      Rate and Rhythm: Normal rate and regular rhythm. Heart sounds: Normal heart sounds. No gallop. Pulmonary:      Effort: Pulmonary effort is normal.      Breath sounds: Normal breath sounds. Musculoskeletal:      Right lower leg: No edema. Left lower leg: No edema. Neurological:      Mental Status: She is alert. ASSESSMENT and PLAN  Diagnoses and all orders for this visit:    1. Essential hypertension  Has some higher readings with her meter at home. -     Increase hydroCHLOROthiazide (HYDRODIURIL) 12.5 mg tablet; Take 2 Tablets by mouth daily. Discussed sodium restriction, high k rich diet, maintaining ideal body weight and regular exercise program such as daily walking 30 min perday 4-5 times per week, as physiologic means to achieve blood pressure control. Medication compliance advised.      2. Encounter for medication monitoring      Follow-up and Dispositions    · Return in about 3 months (around 2/10/2022). reviewed diet, exercise and weight control  cardiovascular risk and specific lipid/LDL goals reviewed  reviewed medications and side effects in detail    I have discussed diagnosis listed in this note with pt and/or family. I have discussed treatment plans and options and the risk/benefit analysis of those options, including safe use of medications and possible medication side effects. Through the use of shared decision making we have agreed to the above plan. The patient has received an after-visit summary and questions were answered concerning future plans and follow up. Advise pt of any urgent changes then to proceed to the ER.

## 2022-01-14 ENCOUNTER — TELEPHONE (OUTPATIENT)
Dept: FAMILY MEDICINE CLINIC | Age: 77
End: 2022-01-14

## 2022-01-14 DIAGNOSIS — E11.42 TYPE 2 DIABETES MELLITUS WITH DIABETIC POLYNEUROPATHY, WITHOUT LONG-TERM CURRENT USE OF INSULIN (HCC): Primary | ICD-10-CM

## 2022-02-10 ENCOUNTER — TELEPHONE (OUTPATIENT)
Dept: PHARMACY | Age: 77
End: 2022-02-10

## 2022-02-10 NOTE — TELEPHONE ENCOUNTER
Jassi Brandt MD, from below, appt with you 2/11/22  - Chart reviewed d/t insurer-identified gap: diabetes rx claims and no statin rx claim; as you noted, myalgia with rosuvastatin (and appears prev side effect with atorvastatin) with plan to continue monitoring for now    If appropriate, please consider using one of the following CMS allowable diagnoses within visit encounter:   Myalgia due to statin (M79.10, T46.6X5A)    Statin myopathy (G72.0)  - This will complete/close this insurer-identified gap for this calendar year: Thank you,  Luis Prekins, PharmD, [facility], toll free: [phone], option 2     ==============================================================  [facility]: STATIN THERAPY REVIEW  Identified statin use in persons with diabetes care gap per [facility]     [facility] IDENTIFIED    Lab Results   Component Value Date/Time    Cholesterol, total 230 (H) 09/29/2021 11:27 AM    HDL Cholesterol 78 09/29/2021 11:27 AM    LDL, calculated 134.2 (H) 09/29/2021 11:27 AM    VLDL, calculated 17.8 09/29/2021 11:27 AM    Triglyceride 89 09/29/2021 11:27 AM    CHOL/HDL Ratio 2.9 09/29/2021 11:27 AM     ALT (SGPT)   Date Value Ref Range Status   09/29/2021 18 12 - 78 U/L Final     AST (SGOT)   Date Value Ref Range Status   09/29/2021 13 (L) 15 - 37 U/L Final     The 10-year ASCVD risk score (Aundra Denver., et al., 2013) is: 43.6%    Values used to calculate the score:      Age: 77 years      Sex: Female      Is Non- : Yes      Diabetic: Yes      Tobacco smoker: No      Systolic Blood Pressure: 381 mmHg      Is BP treated: Yes      HDL Cholesterol: 78 MG/DL      Total Cholesterol: 230 MG/DL     Hyperlipidemia Goal: Patient has a 10-yr ASCVD risk of >40% with DM, HTN; likely could benefit from statin therapy.   · Per 9/29/21 PCP OV note: \"Stopped crestor d/t muscle aching\" and plan \"Will watch cholesterol off of medication for now\"   · Appears rx was for rosuvastatin 5mg; prev rxs for atorvastatin 10mg, 20mg & 40mg as well as simvastatin 20mg - appears atorvastatin 10mg was stopped in 2018 d/t side effects    PLAN  - Continue to monitor  - Consider CMS allowable diagnosis code to close payor-identified statin gap    Future Appointments   Date Time Provider Paula Patel   2/11/2022  9:20 AM Estella Monae MD [facility] BS AMB   3/1/2022  9:30 AM [facility]   7/96/9799 98:10 AM Anastasiya Saenz MD [facility] BS AMB

## 2022-02-11 ENCOUNTER — OFFICE VISIT (OUTPATIENT)
Dept: FAMILY MEDICINE CLINIC | Age: 77
End: 2022-02-11
Payer: MEDICARE

## 2022-02-11 VITALS
DIASTOLIC BLOOD PRESSURE: 70 MMHG | WEIGHT: 170.2 LBS | TEMPERATURE: 97.1 F | SYSTOLIC BLOOD PRESSURE: 138 MMHG | OXYGEN SATURATION: 100 % | BODY MASS INDEX: 30.16 KG/M2 | RESPIRATION RATE: 16 BRPM | HEART RATE: 66 BPM | HEIGHT: 63 IN

## 2022-02-11 DIAGNOSIS — E11.42 TYPE 2 DIABETES MELLITUS WITH DIABETIC POLYNEUROPATHY, WITHOUT LONG-TERM CURRENT USE OF INSULIN (HCC): ICD-10-CM

## 2022-02-11 DIAGNOSIS — Z51.81 ENCOUNTER FOR MEDICATION MONITORING: ICD-10-CM

## 2022-02-11 DIAGNOSIS — I10 ESSENTIAL HYPERTENSION: Primary | ICD-10-CM

## 2022-02-11 DIAGNOSIS — Z85.3 HISTORY OF BREAST CANCER: ICD-10-CM

## 2022-02-11 DIAGNOSIS — E78.2 MIXED HYPERLIPIDEMIA: ICD-10-CM

## 2022-02-11 LAB
ALBUMIN SERPL-MCNC: 3.9 G/DL (ref 3.5–5)
ALBUMIN/GLOB SERPL: 1.1 {RATIO} (ref 1.1–2.2)
ALP SERPL-CCNC: 70 U/L (ref 45–117)
ALT SERPL-CCNC: 12 U/L (ref 12–78)
ANION GAP SERPL CALC-SCNC: 4 MMOL/L (ref 5–15)
AST SERPL-CCNC: 11 U/L (ref 15–37)
BILIRUB SERPL-MCNC: 0.5 MG/DL (ref 0.2–1)
BUN SERPL-MCNC: 16 MG/DL (ref 6–20)
BUN/CREAT SERPL: 32 (ref 12–20)
CALCIUM SERPL-MCNC: 9.5 MG/DL (ref 8.5–10.1)
CHLORIDE SERPL-SCNC: 106 MMOL/L (ref 97–108)
CHOLEST SERPL-MCNC: 220 MG/DL
CO2 SERPL-SCNC: 30 MMOL/L (ref 21–32)
CREAT SERPL-MCNC: 0.5 MG/DL (ref 0.55–1.02)
GLOBULIN SER CALC-MCNC: 3.6 G/DL (ref 2–4)
GLUCOSE POC: 144 MG/DL
GLUCOSE SERPL-MCNC: 112 MG/DL (ref 65–100)
HBA1C MFR BLD HPLC: 6.5 %
HDLC SERPL-MCNC: 75 MG/DL
HDLC SERPL: 2.9 {RATIO} (ref 0–5)
LDLC SERPL CALC-MCNC: 127.8 MG/DL (ref 0–100)
POTASSIUM SERPL-SCNC: 3.5 MMOL/L (ref 3.5–5.1)
PROT SERPL-MCNC: 7.5 G/DL (ref 6.4–8.2)
SODIUM SERPL-SCNC: 140 MMOL/L (ref 136–145)
TRIGL SERPL-MCNC: 86 MG/DL (ref ?–150)
VLDLC SERPL CALC-MCNC: 17.2 MG/DL

## 2022-02-11 PROCEDURE — 3017F COLORECTAL CA SCREEN DOC REV: CPT | Performed by: FAMILY MEDICINE

## 2022-02-11 PROCEDURE — G8752 SYS BP LESS 140: HCPCS | Performed by: FAMILY MEDICINE

## 2022-02-11 PROCEDURE — G8399 PT W/DXA RESULTS DOCUMENT: HCPCS | Performed by: FAMILY MEDICINE

## 2022-02-11 PROCEDURE — 1101F PT FALLS ASSESS-DOCD LE1/YR: CPT | Performed by: FAMILY MEDICINE

## 2022-02-11 PROCEDURE — G8754 DIAS BP LESS 90: HCPCS | Performed by: FAMILY MEDICINE

## 2022-02-11 PROCEDURE — 1090F PRES/ABSN URINE INCON ASSESS: CPT | Performed by: FAMILY MEDICINE

## 2022-02-11 PROCEDURE — 82947 ASSAY GLUCOSE BLOOD QUANT: CPT | Performed by: FAMILY MEDICINE

## 2022-02-11 PROCEDURE — 99214 OFFICE O/P EST MOD 30 MIN: CPT | Performed by: FAMILY MEDICINE

## 2022-02-11 PROCEDURE — 2022F DILAT RTA XM EVC RTNOPTHY: CPT | Performed by: FAMILY MEDICINE

## 2022-02-11 PROCEDURE — G8510 SCR DEP NEG, NO PLAN REQD: HCPCS | Performed by: FAMILY MEDICINE

## 2022-02-11 PROCEDURE — G8536 NO DOC ELDER MAL SCRN: HCPCS | Performed by: FAMILY MEDICINE

## 2022-02-11 PROCEDURE — G8417 CALC BMI ABV UP PARAM F/U: HCPCS | Performed by: FAMILY MEDICINE

## 2022-02-11 PROCEDURE — 3044F HG A1C LEVEL LT 7.0%: CPT | Performed by: FAMILY MEDICINE

## 2022-02-11 PROCEDURE — G8427 DOCREV CUR MEDS BY ELIG CLIN: HCPCS | Performed by: FAMILY MEDICINE

## 2022-02-11 PROCEDURE — 83036 HEMOGLOBIN GLYCOSYLATED A1C: CPT | Performed by: FAMILY MEDICINE

## 2022-02-11 RX ORDER — HYDROCHLOROTHIAZIDE 12.5 MG/1
25 TABLET ORAL DAILY
Qty: 180 TABLET | Refills: 3 | Status: SHIPPED | OUTPATIENT
Start: 2022-02-11

## 2022-02-11 NOTE — PROGRESS NOTES
Chief Complaint   Patient presents with    Hypertension     follow up    Cholesterol Problem     follow up    Diabetes     follow up       Mammogram 5/19/2021    Eye exam 12/23/2021    Colonoscopy 3/4/2016 by Dr. Spencer [place]- repeat in 10 years. 1. Have you been to the ER, urgent care clinic since your last visit? Hospitalized since your last visit? No    2. Have you seen or consulted any other health care providers outside of the [address] [place] [address] since your last visit? Include any pap smears or colon screening.  no

## 2022-02-11 NOTE — PROGRESS NOTES
HISTORY OF PRESENT ILLNESS  Mario Wisdom is a 77 y.o. female. HPI   Follow up on chronic medical problems. Doing the precautionary measures at home to reduce risks of exposure COVID19. Also wearing mask when she is going out. No known sick contacts or known exposure to [place]. Had lumpectomy 2021 for left breast cancer and doing well. No chemo or radiation needed. Overall doing well. HTN follow up:  Compliant w/ meds, low salt diet, and exercise. Has home bp monitorin-160/70-80s  No swelling, headache or dizziness. No chest pain, SOB, palpitations. Otherwise feeling well since the last visit. DM type II follow up:  Compliant w/ meds, diabetic diet, and exercise. Obtains home glucose monitoring averaging 100-140s. Checks BS BID on most days and prn. Pt does not have BS log at visit today. No Rf needed for today. Tingling sensation in the foot and toes which comes and goes. Started on nerve reverse by her podiatrist but she has not yet started taking it. Denies polyuria and polydipsia. No blurred vision. No significant weight changes. Hypercholesterolemia follow up:  Compliant w/ meds, low fat, low cholesterol diet. Stopped crestor d/t muscle aching. Exercising some. No abdominal pain, no skin discoloration. Patient fasting today. HM:   Mammogram 2021   Bone density 3/19/2020   Eye exam 2021   Colonoscopy 3/4/2016 by Dr. Tray Min- repeat in 10 years.     Patient Active Problem List   Diagnosis Code    Right knee DJD M17.11    Arthritis M19.90    Chronic pain G89.29    Hyperlipidemia E78.5    Hypovitaminosis D E55.9    Type 2 diabetes mellitus without complication ([facility] [address] C96.2    Essential hypertension I10    Encounter for medication monitoring Z51.81    Ductal carcinoma in situ (DCIS) of left breast D05.12       Current Outpatient Medications   Medication Sig Dispense Refill    hydroCHLOROthiazide (HYDRODIURIL) 12.5 mg tablet Take 2 Tablets by mouth daily. 180 Tablet 3    amLODIPine (NORVASC) 5 mg tablet Take 2 Tablets by mouth daily. 180 Tablet 3    fluticasone propionate (FLONASE) 50 mcg/actuation nasal spray SHAKE LIQUID AND USE 2 SPRAYS IN EACH NOSTRIL DAILY AS NEEDED FOR RHINITIS 16 g 3    metFORMIN (GLUCOPHAGE) 500 mg tablet Take 1 Tablet by mouth two (2) times daily (with meals). 180 Tablet 3    ibuprofen (MOTRIN) 800 mg tablet TAKE 1 TABLET BY MOUTH THREE TIMES DAILY AS NEEDED FOR PAIN 60 Tablet 1    glucose blood VI test strips (True Metrix Glucose Test Strip) strip USE TO TEST BLOOD SUGAR TWICE DAILY 200 Strip 5    losartan (COZAAR) 100 mg tablet TAKE 1 TABLET BY MOUTH DAILY 90 Tab 3    cholecalciferol (VITAMIN D3) (2,000 UNITS /50 MCG) cap capsule Take 2,000 Units by mouth daily.  Blood-Glucose Meter (TRUE METRIX AIR GLUCOSE METER) misc Use to check blood sugar twice a day, E11.9 1 Each 0    Blood Glucose Control, Low (TRUE METRIX LEVEL 1) soln Use as directed. E11.9 1 Each 3    albuterol (PROVENTIL HFA, VENTOLIN HFA, PROAIR HFA) 90 mcg/actuation inhaler Take 2 Puffs by inhalation every four (4) hours as needed for Wheezing. 1 Inhaler 11    aspirin delayed-release 81 mg tablet Take 1 Tab by mouth daily. 30 Tab 6    loratadine (CLARITIN) 10 mg tablet Take 1 tablet by mouth daily as needed for Allergies. 30 tablet 11    Lancets (PRODIGY TWIST TOP LANCET) misc Use to check blood glucose level twice a day and PRN. 200 Each 3    alcohol swabs (BD SINGLE USE SWABS REGULAR) padm Use to cleanse finger prior to checking glucose level. 200 Each 3    Omega-3-DHA-EPA-Fish Oil 1,000 (120-180) mg Cap Take 1 Cap by mouth two (2) times a week.          Allergies   Allergen Reactions    Crestor [Rosuvastatin] Myalgia       Past Medical History:   Diagnosis Date    Arthritis     knee    Cancer ([facility] [address] 2003    melanoma -right lower extremity    Chronic pain     left knee & left shoulder    Diabetes ([facility] [address]     type 2    Hypertension     Nausea & vomiting     Screen for colon cancer 10/2/2014       Past Surgical History:   Procedure Laterality Date    HX BREAST BIOPSY Right     yrs ago    HX BREAST BIOPSY Left 06/2021    HX BREAST LUMPECTOMY Left 8/18/2021    LEFT BREAST LUMPECTOMY WITH NEEDLE LOCALIZATION (1100) performed by Tony Sultana [facility] at [facility] AM[facility]ORY OR    HX CATARACT REMOVAL  2010    bilateral eyes    HX CHOLECYSTECTOMY      HX HYSTERECTOMY  1990    fibroid    HX KNEE ARTHROSCOPY  1999    left knee replacement    HX ORTHOPAEDIC      age 40- right foot    HX ORTHOPAEDIC  9/11/2015    left carpal tunnel    HX OTHER SURGICAL  2005    permanent teeth replacements    HX OTHER SURGICAL Right 2003    melonoma removed rt leg    HX ROTATOR CUFF REPAIR  09/28/2020    left    HX WISDOM TEETH EXTRACTION      MD COLONOSCOPY FLX DX W/COLLJ SPEC WHEN PFRMD  2007    date and md unknown       Family History   Problem Relation Age of Onset    No Known Problems Mother     No Known Problems Father     Thyroid Disease Sister     Anesth Problems Neg Hx        Social History     Tobacco Use    Smoking status: Never Smoker    Smokeless tobacco: Never Used   Substance Use Topics    Alcohol use: No     Alcohol/week: 0.0 standard drinks     Comment: RARE         Lab Results   Component Value Date/Time    WBC 3.9 09/29/2021 11:27 AM    HGB 14.2 09/29/2021 11:27 AM    Hemoglobin (POC) 15.3 12/27/2009 07:16 AM    HCT 43.3 09/29/2021 11:27 AM    Hematocrit (POC) 45 12/27/2009 07:16 AM    PLATELET 810 69/40/4261 11:27 AM    MCV 95.2 09/29/2021 11:27 AM     Lab Results   Component Value Date/Time    Cholesterol, total 230 (H) 09/29/2021 11:27 AM    HDL Cholesterol 78 09/29/2021 11:27 AM    LDL, calculated 134.2 (H) 09/29/2021 11:27 AM    Triglyceride 89 09/29/2021 11:27 AM    CHOL/HDL Ratio 2.9 09/29/2021 11:27 AM     Lab Results   Component Value Date/Time    TSH 2.220 09/05/2017 09:57 AM      Lab Results   Component Value Date/Time    Sodium 138 09/29/2021 11:27 AM    Potassium 3.6 09/29/2021 11:27 AM    Chloride 104 09/29/2021 11:27 AM    CO2 26 09/29/2021 11:27 AM    Anion gap 8 09/29/2021 11:27 AM    Glucose 135 (H) 09/29/2021 11:27 AM    BUN 15 09/29/2021 11:27 AM    Creatinine 0.64 09/29/2021 11:27 AM    BUN/Creatinine ratio 23 (H) 09/29/2021 11:27 AM    GFR est AA >60 09/29/2021 11:27 AM    GFR est non-AA >60 09/29/2021 11:27 AM    Calcium 9.7 09/29/2021 11:27 AM    Bilirubin, total 0.5 09/29/2021 11:27 AM    ALT (SGPT) 18 09/29/2021 11:27 AM    Alk. phosphatase 74 09/29/2021 11:27 AM    Protein, total 8.1 09/29/2021 11:27 AM    Albumin 4.1 09/29/2021 11:27 AM    Globulin 4.0 09/29/2021 11:27 AM    A-G Ratio 1.0 (L) 09/29/2021 11:27 AM      Lab Results   Component Value Date/Time    Hemoglobin A1c 6.9 (H) 08/11/2021 08:47 AM    Hemoglobin A1c (POC) 6.6 09/29/2021 11:03 AM         Review of Systems   Constitutional: Negative for malaise/fatigue. HENT: Negative for congestion. Eyes: Negative for blurred vision. Respiratory: Negative for cough and shortness of breath. Cardiovascular: Negative for chest pain, palpitations and leg swelling. Gastrointestinal: Negative for abdominal pain, constipation and heartburn. Genitourinary: Negative for dysuria, frequency and urgency. Musculoskeletal: Negative for back pain and joint pain. Neurological: Negative for dizziness, tingling and headaches. Endo/Heme/Allergies: Negative for environmental allergies. Psychiatric/Behavioral: Negative for depression. The patient does not have insomnia. Physical Exam  Vitals and nursing note reviewed. Constitutional:       Appearance: Normal appearance. She is well-developed.       Comments: /70 (BP 1 Location: Left arm, BP Patient Position: Sitting)   Pulse 66   Temp 97.1 °F (36.2 °C) (Oral)   Resp 16   Ht 5' 2.5\" (1.588 m)   Wt 170 lb 3.2 oz (77.2 kg)   LMP 05/10/1990   SpO2 100%   BMI 30.63 kg/m²      HENT:      Right Ear: Tympanic membrane and ear canal normal.      Left Ear: Tympanic membrane and ear canal normal.      Nose: No mucosal edema. Neck:      Thyroid: No thyromegaly. Cardiovascular:      Rate and Rhythm: Normal rate and regular rhythm. Heart sounds: Normal heart sounds. Pulmonary:      Effort: Pulmonary effort is normal.      Breath sounds: Normal breath sounds. Abdominal:      General: Bowel sounds are normal.      Palpations: Abdomen is soft. There is no mass. Tenderness: There is no abdominal tenderness. Musculoskeletal:         General: No swelling. Normal range of motion. Cervical back: Normal range of motion and neck supple. Right lower leg: No edema. Left lower leg: No edema. Lymphadenopathy:      Cervical: No cervical adenopathy. Skin:     General: Skin is warm and dry. Neurological:      General: No focal deficit present. Mental Status: She is alert and oriented to person, place, and time. Psychiatric:         Mood and Affect: Mood normal.         ASSESSMENT and PLAN  Diagnoses and all orders for this visit:    1. Essential hypertension  -     Refill hydroCHLOROthiazide (HYDRODIURIL) 12.5 mg tablet; Take 2 Tablets by mouth daily. Discussed sodium restriction, high k rich diet, maintaining ideal body weight and regular exercise program such as daily walking 30 min perday 4-5 times per week, as physiologic means to achieve blood pressure control. Medication compliance advised. 2. Type 2 diabetes mellitus with diabetic polyneuropathy, without long-term current use of insulin ([facility])  a1c stable at 6.5%. Continue to monitor. Work on diet and exercise. -     AMB POC HEMOGLOBIN A1C  -     AMB POC GLUCOSE, QUANTITATIVE, BLOOD    3. Mixed hyperlipidemia  Continue to monitor. Work on diet and exercise.  -     LIPID PANEL; Future    4. History of breast cancer  Doing well. Has follow up with next shane with surgeon.      5. Encounter for medication monitoring  - METABOLIC PANEL, COMPREHENSIVE; Future      Follow-up and Dispositions    · Return in about 5 months (around 7/11/2022). reviewed diet, exercise and weight control  cardiovascular risk and specific lipid/LDL goals reviewed  reviewed medications and side effects in detail  specific diabetic recommendations: low cholesterol diet, weight control and daily exercise discussed, all medications, side effects and compliance discussed carefully, foot care discussed and Podiatry visits discussed, annual eye examinations at Ophthalmology discussed and glycohemoglobin and other lab monitoring discussed     I have discussed diagnosis listed in this note with pt and/or family. I have discussed treatment plans and options and the risk/benefit analysis of those options, including safe use of medications and possible medication side effects. Through the use of shared decision making we have agreed to the above plan. The patient has received an after-visit summary and questions were answered concerning future plans and follow up. Advise pt of any urgent changes then to proceed to the ER.

## 2022-02-14 NOTE — TELEPHONE ENCOUNTER
Note reviewed/doned by provider; patient attended 2/11 appt, no myopathy/myalgia dx at this time.    =========================================================   For [facility] in place: No   Recommendation Provided To: Provider: 1 via Note to Provider    Gap Closed?: No   Intervention Accepted By: Provider: 0   Time Spent (min): 15

## 2022-03-01 ENCOUNTER — HOSPITAL ENCOUNTER (OUTPATIENT)
Dept: MAMMOGRAPHY | Age: 77
Discharge: HOME | End: 2022-03-01
Attending: SURGERY
Payer: MEDICARE

## 2022-03-01 DIAGNOSIS — Z85.3 HISTORY OF LEFT BREAST CANCER: ICD-10-CM

## 2022-03-01 PROCEDURE — 77061 BREAST TOMOSYNTHESIS UNI: CPT

## 2022-03-07 ENCOUNTER — TRANSCRIBE ORDER (OUTPATIENT)
Dept: SCHEDULING | Age: 77
End: 2022-03-07

## 2022-03-07 DIAGNOSIS — R92.8 ABNORMAL MAMMOGRAM: Primary | ICD-10-CM

## 2022-03-16 ENCOUNTER — OFFICE VISIT (OUTPATIENT)
Dept: SURGERY | Age: 77
End: 2022-03-16
Payer: MEDICARE

## 2022-03-16 VITALS
DIASTOLIC BLOOD PRESSURE: 62 MMHG | WEIGHT: 165 LBS | BODY MASS INDEX: 29.7 KG/M2 | SYSTOLIC BLOOD PRESSURE: 145 MMHG | HEART RATE: 75 BPM

## 2022-03-16 DIAGNOSIS — D05.12 DUCTAL CARCINOMA IN SITU (DCIS) OF LEFT BREAST: ICD-10-CM

## 2022-03-16 PROCEDURE — G8427 DOCREV CUR MEDS BY ELIG CLIN: HCPCS | Performed by: SURGERY

## 2022-03-16 PROCEDURE — 1090F PRES/ABSN URINE INCON ASSESS: CPT | Performed by: SURGERY

## 2022-03-16 PROCEDURE — G8753 SYS BP > OR = 140: HCPCS | Performed by: SURGERY

## 2022-03-16 PROCEDURE — G8417 CALC BMI ABV UP PARAM F/U: HCPCS | Performed by: SURGERY

## 2022-03-16 PROCEDURE — 3017F COLORECTAL CA SCREEN DOC REV: CPT | Performed by: SURGERY

## 2022-03-16 PROCEDURE — G8399 PT W/DXA RESULTS DOCUMENT: HCPCS | Performed by: SURGERY

## 2022-03-16 PROCEDURE — 1101F PT FALLS ASSESS-DOCD LE1/YR: CPT | Performed by: SURGERY

## 2022-03-16 PROCEDURE — G8432 DEP SCR NOT DOC, RNG: HCPCS | Performed by: SURGERY

## 2022-03-16 PROCEDURE — G8754 DIAS BP LESS 90: HCPCS | Performed by: SURGERY

## 2022-03-16 PROCEDURE — 99213 OFFICE O/P EST LOW 20 MIN: CPT | Performed by: SURGERY

## 2022-03-16 PROCEDURE — G8536 NO DOC ELDER MAL SCRN: HCPCS | Performed by: SURGERY

## 2022-03-16 NOTE — PROGRESS NOTES
HISTORY OF PRESENT ILLNESS  Subhash Ramirez is a 77 y.o. female. HPI  ESTABLISHED patient here for 6 month f/u LEFT breast cancer, lumpectomy done 8/18/21. Patient reports doing well with no issues. 06/24/21: LEFT breast bx, 3:00. PATH: DCIS, 5mm largest single glass slide measurement, papillary and cribriform, nuclear grade 2 with focal necrosis, ER+(100%). Clinical stage 0.     08/18/21: LEFT breast lumpectomy. PATH:  · Lumpectomy: Radial scar/complex sclerosing lesion with LCIS, intraductal papilloma, usual ductal hyperplasia, apocrine   metaplasia, and microcyst formation. No DCIS identified. · Inferior medial margin excision: DCIS, 25mm, nuclear grade 2, cribriform and papillary types with associated comedo-type necrosis and microcalcifications, negative margins, pathologic stage pTis pNX. Adjacent intraductal papilloma, usual ductal hyperplasia, apocrine metaplasia, and microcyst formation. · DCISionRT pending.     ROS    Physical Exam    ASSESSMENT and PLAN  {ASSESSMENT/PLAN:[id number]}

## 2022-03-16 NOTE — PROGRESS NOTES
HISTORY OF PRESENT ILLNESS  Maria Fernanda Ojeda is a 77 y.o. female. HPI  ESTABLISHED patient here for 6 month f/u of LEFT breast cancer, lumpectomy done on 8/18/21. Patient reports doing well with no issues. 06/24/21: LEFT breast bx, 3:00. PATH: DCIS, 5mm largest single glass slide measurement, papillary and cribriform, nuclear grade 2 with focal necrosis, ER+(100%). Clinical stage 0.     08/18/21: LEFT breast lumpectomy. PATH:  · Lumpectomy: Radial scar/complex sclerosing lesion with LCIS, intraductal papilloma, usual ductal hyperplasia, apocrine   metaplasia, and microcyst formation. No DCIS identified. · Inferior medial margin excision: DCIS, 25mm, nuclear grade 2, cribriform and papillary types with associated comedo-type necrosis and microcalcifications, negative margins, pathologic stage pTis pNX. Adjacent intraductal papilloma, usual ductal hyperplasia, apocrine metaplasia, and microcyst formation. · DCISionRT: Elevated risk, 3.7.       Past Medical History:   Diagnosis Date    Arthritis     knee    Cancer ([address] 2003    melanoma -right lower extremity    Chronic pain     left knee & left shoulder    Diabetes ([address]     type 2    Hypertension     Nausea & vomiting     Screen for colon cancer 10/2/2014       Past Surgical History:   Procedure Laterality Date    HX BREAST BIOPSY Right     yrs ago    HX BREAST BIOPSY Left 06/2021    HX BREAST LUMPECTOMY Left 8/18/2021    LEFT BREAST LUMPECTOMY WITH NEEDLE LOCALIZATION (1100) performed by Jeaneth Lin MD at [facility] OR    HX CATARACT REMOVAL  2010    bilateral eyes    HX CHOLECYSTECTOMY      HX HYSTERECTOMY  1990    fibroid    HX KNEE ARTHROSCOPY  1999    left knee replacement    HX ORTHOPAEDIC      age 40- right foot    HX ORTHOPAEDIC  9/11/2015    left carpal tunnel    HX OTHER SURGICAL  2005    permanent teeth replacements    HX OTHER SURGICAL Right 2003    melonoma removed rt leg    HX ROTATOR CUFF REPAIR  09/28/2020 left    HX WISDOM TEETH EXTRACTION      SC COLONOSCOPY FLX DX W/COLLJ SPEC WHEN PFRMD  2007    date and md unknown       Social History     Socioeconomic History    Marital status:      Spouse name: Not on file    Number of children: Not on file    Years of education: Not on file    Highest education level: Not on file   Occupational History    Not on file   Tobacco Use    Smoking status: Never Smoker    Smokeless tobacco: Never Used   Vaping Use    Vaping Use: Never used   Substance and Sexual Activity    Alcohol use: No     Alcohol/week: 0.0 standard drinks     Comment: RARE     Drug use: Yes     Types: Marijuana     Comment: edibles-every 2 weeks    Sexual activity: Yes     Partners: Male     Birth control/protection: None, Surgical   Other Topics Concern    Not on file   Social History Narrative    Not on file     Social Determinants of Health     Financial Resource Strain:     Difficulty of Paying Living Expenses: Not on file   Food Insecurity:     Worried About Running Out of Food in the Last Year: Not on file    Aryan of Food in the Last Year: Not on file   Transportation Needs:     Lack of Transportation (Medical): Not on file    Lack of Transportation (Non-Medical):  Not on file   Physical Activity:     Days of Exercise per Week: Not on file    Minutes of Exercise per Session: Not on file   Stress:     Feeling of Stress : Not on file   Social Connections:     Frequency of Communication with Friends and Family: Not on file    Frequency of Social Gatherings with Friends and Family: Not on file    Attends Sikhism Services: Not on file    Active Member of Clubs or Organizations: Not on file    Attends Club or Organization Meetings: Not on file    Marital Status: Not on file   Intimate Partner Violence:     Fear of Current or Ex-Partner: Not on file    Emotionally Abused: Not on file    Physically Abused: Not on file    Sexually Abused: Not on file   Housing Stability:  Unable to Pay for Housing in the Last Year: Not on file    Number of Places Lived in the Last Year: Not on file    Unstable Housing in the Last Year: Not on file       Current Outpatient Medications on File Prior to Visit   Medication Sig Dispense Refill    hydroCHLOROthiazide (HYDRODIURIL) 12.5 mg tablet Take 2 Tablets by mouth daily. 180 Tablet 3    amLODIPine (NORVASC) 5 mg tablet Take 2 Tablets by mouth daily. 180 Tablet 3    fluticasone propionate (FLONASE) 50 mcg/actuation nasal spray SHAKE LIQUID AND USE 2 SPRAYS IN EACH NOSTRIL DAILY AS NEEDED FOR RHINITIS 16 g 3    metFORMIN (GLUCOPHAGE) 500 mg tablet Take 1 Tablet by mouth two (2) times daily (with meals). 180 Tablet 3    ibuprofen (MOTRIN) 800 mg tablet TAKE 1 TABLET BY MOUTH THREE TIMES DAILY AS NEEDED FOR PAIN 60 Tablet 1    glucose blood VI test strips (True Metrix Glucose Test Strip) strip USE TO TEST BLOOD SUGAR TWICE DAILY 200 Strip 5    losartan (COZAAR) 100 mg tablet TAKE 1 TABLET BY MOUTH DAILY 90 Tab 3    cholecalciferol (VITAMIN D3) (2,000 UNITS /50 MCG) cap capsule Take 2,000 Units by mouth daily.  Blood-Glucose Meter (TRUE METRIX AIR GLUCOSE METER) misc Use to check blood sugar twice a day, E11.9 1 Each 0    Blood Glucose Control, Low (TRUE METRIX LEVEL 1) soln Use as directed. E11.9 1 Each 3    albuterol (PROVENTIL HFA, VENTOLIN HFA, PROAIR HFA) 90 mcg/actuation inhaler Take 2 Puffs by inhalation every four (4) hours as needed for Wheezing. 1 Inhaler 11    aspirin delayed-release 81 mg tablet Take 1 Tab by mouth daily. 30 Tab 6    loratadine (CLARITIN) 10 mg tablet Take 1 tablet by mouth daily as needed for Allergies. 30 tablet 11    Lancets (PRODIGY TWIST TOP LANCET) misc Use to check blood glucose level twice a day and PRN. 200 Each 3    alcohol swabs (BD SINGLE USE SWABS REGULAR) padm Use to cleanse finger prior to checking glucose level.  200 Each 3    Omega-3-DHA-EPA-Fish Oil 1,000 (120-180) mg Cap Take 1 Capsule by mouth daily. No current facility-administered medications on file prior to visit. Allergies   Allergen Reactions    Crestor [Rosuvastatin] Myalgia       OB History             Para        Term   0            AB        Living           SAB        IAB        Ectopic        Molar        Multiple        Live Births              Obstetric Comments   Menarche 16, LMP unknown, # of children 0, age of 1st delivery N/A, Hysterectomy/oophorectomy No/No, Breast bx Yes, history of breast feeding No, BCP No, Hormone therapy No             ROS      Physical Exam  Exam conducted with a chaperone present. Cardiovascular:      Rate and Rhythm: Normal rate and regular rhythm. Heart sounds: Normal heart sounds. Pulmonary:      Breath sounds: Normal breath sounds. Chest:   Breasts: Breasts are symmetrical.      Right: Normal. No swelling, bleeding, inverted nipple, mass, nipple discharge, skin change, tenderness, axillary adenopathy or supraclavicular adenopathy. Left: Normal. No swelling, bleeding, inverted nipple, mass, nipple discharge, skin change, tenderness, axillary adenopathy or supraclavicular adenopathy. Lymphadenopathy:      Cervical:      Right cervical: No superficial, deep or posterior cervical adenopathy. Left cervical: No superficial, deep or posterior cervical adenopathy. Upper Body:      Right upper body: No supraclavicular or axillary adenopathy. Left upper body: No supraclavicular or axillary adenopathy. ASSESSMENT and PLAN    ICD-10-CM ICD-9-CM    1. Ductal carcinoma in situ (DCIS) of left breast  D05.12 233.0      Patient presents to follow up on LEFT breast cancer, and is doing well overall. Upon physical examination, noted. Well healed incision s/p lumpectomy, no evidence of local recurrence. F/U in 1 year. This plan was reviewed with the patient and patient agrees. All questions were answered.     Total time spent was 20 minutes.     Written by Anton Benton, as dictated by Dr. Richard Gibbons MD.

## 2022-03-16 NOTE — PATIENT INSTRUCTIONS

## 2022-03-19 PROBLEM — D05.12 DUCTAL CARCINOMA IN SITU (DCIS) OF LEFT BREAST: Status: ACTIVE | Noted: 2021-06-24

## 2022-03-30 RX ORDER — LOSARTAN POTASSIUM 100 MG/1
TABLET ORAL
Qty: 90 TABLET | Refills: 3 | Status: SHIPPED | OUTPATIENT
Start: 2022-03-30

## 2022-04-11 DIAGNOSIS — E11.9 TYPE 2 DIABETES MELLITUS WITHOUT COMPLICATION, WITHOUT LONG-TERM CURRENT USE OF INSULIN (HCC): ICD-10-CM

## 2022-04-11 RX ORDER — METFORMIN HYDROCHLORIDE 500 MG/1
500 TABLET ORAL 2 TIMES DAILY WITH MEALS
Qty: 60 TABLET | Refills: 11 | Status: SHIPPED | OUTPATIENT
Start: 2022-04-11 | End: 2022-09-08

## 2022-04-11 NOTE — TELEPHONE ENCOUNTER
Patient is calling, because she lost of a bottle of;metFORMIN (GLUCOPHAGE) 500 mg tablet. She will be out on Friday. Please call @[phone].

## 2022-04-18 DIAGNOSIS — I10 ESSENTIAL HYPERTENSION: ICD-10-CM

## 2022-04-18 RX ORDER — AMLODIPINE BESYLATE 5 MG/1
TABLET ORAL
Qty: 90 TABLET | Refills: 3 | Status: SHIPPED | OUTPATIENT
Start: 2022-04-18

## 2022-06-03 ENCOUNTER — OFFICE VISIT (OUTPATIENT)
Dept: FAMILY MEDICINE CLINIC | Age: 77
End: 2022-06-03
Payer: MEDICARE

## 2022-06-03 VITALS
OXYGEN SATURATION: 97 % | HEART RATE: 66 BPM | TEMPERATURE: 97.8 F | SYSTOLIC BLOOD PRESSURE: 136 MMHG | WEIGHT: 165.4 LBS | BODY MASS INDEX: 29.3 KG/M2 | HEIGHT: 63 IN | RESPIRATION RATE: 16 BRPM | DIASTOLIC BLOOD PRESSURE: 80 MMHG

## 2022-06-03 DIAGNOSIS — Z85.3 HISTORY OF BREAST CANCER: ICD-10-CM

## 2022-06-03 DIAGNOSIS — I10 ESSENTIAL HYPERTENSION: Primary | ICD-10-CM

## 2022-06-03 DIAGNOSIS — Z63.6 CAREGIVER STRESS: ICD-10-CM

## 2022-06-03 DIAGNOSIS — E11.9 TYPE 2 DIABETES MELLITUS WITHOUT COMPLICATION, WITHOUT LONG-TERM CURRENT USE OF INSULIN (HCC): ICD-10-CM

## 2022-06-03 DIAGNOSIS — E78.2 MIXED HYPERLIPIDEMIA: ICD-10-CM

## 2022-06-03 DIAGNOSIS — Z23 ENCOUNTER FOR ADMINISTRATION OF COVID-19 VACCINE: ICD-10-CM

## 2022-06-03 DIAGNOSIS — Z51.81 ENCOUNTER FOR MEDICATION MONITORING: ICD-10-CM

## 2022-06-03 LAB
GLUCOSE POC: 120 MG/DL
HBA1C MFR BLD HPLC: 6 %

## 2022-06-03 PROCEDURE — 0054A COVID-19, PFIZER GRAY TOP, DO NOT DILUTE, TRIS-SUCROSE, 12+ YRS, PF, 30MCG/0.3 ML DOSE: CPT | Performed by: FAMILY MEDICINE

## 2022-06-03 PROCEDURE — 83036 HEMOGLOBIN GLYCOSYLATED A1C: CPT | Performed by: FAMILY MEDICINE

## 2022-06-03 PROCEDURE — 1101F PT FALLS ASSESS-DOCD LE1/YR: CPT | Performed by: FAMILY MEDICINE

## 2022-06-03 PROCEDURE — 99214 OFFICE O/P EST MOD 30 MIN: CPT | Performed by: FAMILY MEDICINE

## 2022-06-03 PROCEDURE — G8754 DIAS BP LESS 90: HCPCS | Performed by: FAMILY MEDICINE

## 2022-06-03 PROCEDURE — G8536 NO DOC ELDER MAL SCRN: HCPCS | Performed by: FAMILY MEDICINE

## 2022-06-03 PROCEDURE — G8399 PT W/DXA RESULTS DOCUMENT: HCPCS | Performed by: FAMILY MEDICINE

## 2022-06-03 PROCEDURE — G8417 CALC BMI ABV UP PARAM F/U: HCPCS | Performed by: FAMILY MEDICINE

## 2022-06-03 PROCEDURE — G8752 SYS BP LESS 140: HCPCS | Performed by: FAMILY MEDICINE

## 2022-06-03 PROCEDURE — 91300 PR SARSCOV2 VACCINE DIL RECON 30 MCG/0.3 ML IM USE: CPT | Performed by: FAMILY MEDICINE

## 2022-06-03 PROCEDURE — 1123F ACP DISCUSS/DSCN MKR DOCD: CPT | Performed by: FAMILY MEDICINE

## 2022-06-03 PROCEDURE — 82947 ASSAY GLUCOSE BLOOD QUANT: CPT | Performed by: FAMILY MEDICINE

## 2022-06-03 PROCEDURE — 1090F PRES/ABSN URINE INCON ASSESS: CPT | Performed by: FAMILY MEDICINE

## 2022-06-03 PROCEDURE — G8510 SCR DEP NEG, NO PLAN REQD: HCPCS | Performed by: FAMILY MEDICINE

## 2022-06-03 PROCEDURE — G8427 DOCREV CUR MEDS BY ELIG CLIN: HCPCS | Performed by: FAMILY MEDICINE

## 2022-06-03 PROCEDURE — 91305 COVID-19, PFIZER GRAY TOP, DO NOT DILUTE, TRIS-SUCROSE, 12+ YRS, PF, 30MCG/0.3 ML DOSE: CPT | Performed by: FAMILY MEDICINE

## 2022-06-03 PROCEDURE — 3044F HG A1C LEVEL LT 7.0%: CPT | Performed by: FAMILY MEDICINE

## 2022-06-03 SDOH — SOCIAL STABILITY - SOCIAL INSECURITY: DEPENDENT RELATIVE NEEDING CARE AT HOME: Z63.6

## 2022-06-03 NOTE — PROGRESS NOTES
Chief Complaint   Patient presents with    Stress     pt c/o being stressed about for about 5 months due to helping take care of her sister in law         Mammogram 5/19/2021. Patient has an appt 9/6/2022    Eye exam 12/23/2021 by Dr. Nav Richard. Colonoscopy 3/4/2016 by Dr. [facility]- repeat in 10 years. Verbal Order with Readback given by Ramu García MD for Pfizer COVID-19 Booster, 0.3 mL. Given in left deltoid without difficulty. Pt monitored for 15 minutes with no reaction. 1. Have you been to the ER, urgent care clinic since your last visit? Hospitalized since your last visit? No    2. Have you seen or consulted any other health care providers outside of the [address] [place] [address] since your last visit? Include any pap smears or colon screening.  no

## 2022-06-03 NOTE — PROGRESS NOTES
HISTORY OF PRESENT ILLNESS  Cortney Barajas is a 69 y.o. female. Follow up on chronic medical problems. Doing the precautionary measures at home to reduce risks of exposure COVID19. Also wearing mask when she is going out. No known sick contacts or known exposure to [address]. Had lumpectomy 2021 for left breast cancer and doing well. No chemo or radiation needed. Increased stress at home with caring for her sister-in law who has multiple medical problems and on dialysis. Not sure how much longer she will be hanny to keep her in her home. She is using marijuana to help her to relax from the stress. She sprinkles it on her food in the evenings. HTN follow up:  Compliant w/ meds, low salt diet, and exercise. Has home bp monitorin-160/70-80s  No swelling, headache or dizziness. No chest pain, SOB, palpitations. Otherwise feeling well since the last visit. DM type II follow up:  Compliant w/ meds, diabetic diet, and exercise. Obtains home glucose monitoring averaging 100-140s. Checks BS BID on most days and prn. Pt does not have BS log at visit today. No Rf needed for today. Tingling sensation in the foot and toes which comes and goes. Started on nerve reverse by her podiatrist but she has not yet started taking it. Denies polyuria and polydipsia. No blurred vision. No significant weight changes. Hypercholesterolemia follow up:  Compliant w/ meds, low fat, low cholesterol diet. Stopped crestor d/t muscle aching. Exercising some. No abdominal pain, no skin discoloration. Patient fasting today. HM:   Mammogram 2021   Bone density 3/19/2020   Eye exam 2021   Colonoscopy 3/4/2016 by Dr. Khoi Blackwood- repeat in 10 years.     Patient Active Problem List   Diagnosis Code    Right knee DJD M17.11    Arthritis M19.90    Chronic pain G89.29    Hyperlipidemia E78.5    Hypovitaminosis D E55.9    Type 2 diabetes mellitus without complication ([facility] J44.3    Essential hypertension I10    Encounter for medication monitoring Z51.81    Ductal carcinoma in situ (DCIS) of left breast D05.12       Current Outpatient Medications   Medication Sig Dispense Refill    amLODIPine (NORVASC) 5 mg tablet TAKE 1 TABLET BY MOUTH DAILY 90 Tablet 3    metFORMIN (GLUCOPHAGE) 500 mg tablet Take 1 Tablet by mouth two (2) times daily (with meals). 60 Tablet 11    losartan (COZAAR) 100 mg tablet TAKE 1 TABLET BY MOUTH DAILY 90 Tablet 3    hydroCHLOROthiazide (HYDRODIURIL) 12.5 mg tablet Take 2 Tablets by mouth daily. 180 Tablet 3    fluticasone propionate (FLONASE) 50 mcg/actuation nasal spray SHAKE LIQUID AND USE 2 SPRAYS IN EACH NOSTRIL DAILY AS NEEDED FOR RHINITIS 16 g 3    ibuprofen (MOTRIN) 800 mg tablet TAKE 1 TABLET BY MOUTH THREE TIMES DAILY AS NEEDED FOR PAIN 60 Tablet 1    glucose blood VI test strips (True Metrix Glucose Test Strip) strip USE TO TEST BLOOD SUGAR TWICE DAILY 200 Strip 5    cholecalciferol (VITAMIN D3) (2,000 UNITS /50 MCG) cap capsule Take 2,000 Units by mouth daily.  Blood-Glucose Meter (TRUE METRIX AIR GLUCOSE METER) misc Use to check blood sugar twice a day, E11.9 1 Each 0    Blood Glucose Control, Low (TRUE METRIX LEVEL 1) soln Use as directed. E11.9 1 Each 3    albuterol (PROVENTIL HFA, VENTOLIN HFA, PROAIR HFA) 90 mcg/actuation inhaler Take 2 Puffs by inhalation every four (4) hours as needed for Wheezing. 1 Inhaler 11    aspirin delayed-release 81 mg tablet Take 1 Tab by mouth daily. 30 Tab 6    loratadine (CLARITIN) 10 mg tablet Take 1 tablet by mouth daily as needed for Allergies. 30 tablet 11    Lancets (PRODIGY TWIST TOP LANCET) misc Use to check blood glucose level twice a day and PRN. 200 Each 3    alcohol swabs (BD SINGLE USE SWABS REGULAR) padm Use to cleanse finger prior to checking glucose level. 200 Each 3    Omega-3-DHA-EPA-Fish Oil 1,000 (120-180) mg Cap Take 1 Capsule by mouth daily.          Allergies   Allergen Reactions    Crestor [Rosuvastatin] Myalgia         Past Medical History:   Diagnosis Date    Arthritis     knee    Cancer ([facility] 2003    melanoma -right lower extremity    Chronic pain     left knee & left shoulder    Diabetes ([facility]     type 2    Hypertension     Nausea & vomiting     Screen for colon cancer 10/2/2014         Past Surgical History:   Procedure Laterality Date    HX BREAST BIOPSY Right     yrs ago    HX BREAST BIOPSY Left 06/2021    HX BREAST LUMPECTOMY Left 8/18/2021    LEFT BREAST LUMPECTOMY WITH NEEDLE LOCALIZATION (1100) performed by Rosalba Bo [facility] at [facility]    HX CATARACT REMOVAL  2010    bilateral eyes    HX CHOLECYSTECTOMY      HX HYSTERECTOMY  1990    fibroid    HX KNEE ARTHROSCOPY  1999    left knee replacement    HX ORTHOPAEDIC      age 40- right foot    HX ORTHOPAEDIC  9/11/2015    left carpal tunnel    HX OTHER SURGICAL  2005    permanent teeth replacements    HX OTHER SURGICAL Right 2003    melonoma removed rt leg    HX ROTATOR CUFF REPAIR  09/28/2020    left    HX WISDOM TEETH EXTRACTION      DC COLONOSCOPY FLX DX W/COLLJ SPEC WHEN PFRMD  2007    date and md unknown         Family History   Problem Relation Age of Onset    No Known Problems Mother     No Known Problems Father     Thyroid Disease Sister     Anesth Problems Neg Hx        Social History     Tobacco Use    Smoking status: Never Smoker    Smokeless tobacco: Never Used   Substance Use Topics    Alcohol use:  Yes     Alcohol/week: 0.0 standard drinks     Comment: RARE         Lab Results   Component Value Date/Time    WBC 3.9 09/29/2021 11:27 AM    HGB 14.2 09/29/2021 11:27 AM    Hemoglobin (POC) 15.3 12/27/2009 07:16 AM    HCT 43.3 09/29/2021 11:27 AM    Hematocrit (POC) 45 12/27/2009 07:16 AM    PLATELET 455 49/15/3504 11:27 AM    MCV 95.2 09/29/2021 11:27 AM     Lab Results   Component Value Date/Time    Cholesterol, total 220 (H) 02/11/2022 10:17 AM    HDL Cholesterol 75 02/11/2022 10:17 AM LDL, calculated 127.8 (H) 02/11/2022 10:17 AM    Triglyceride 86 02/11/2022 10:17 AM    CHOL/HDL Ratio 2.9 02/11/2022 10:17 AM     Lab Results   Component Value Date/Time    TSH 2.220 09/05/2017 09:57 AM      Lab Results   Component Value Date/Time    Sodium 140 02/11/2022 10:17 AM    Potassium 3.5 02/11/2022 10:17 AM    Chloride 106 02/11/2022 10:17 AM    CO2 30 02/11/2022 10:17 AM    Anion gap 4 (L) 02/11/2022 10:17 AM    Glucose 112 (H) 02/11/2022 10:17 AM    BUN 16 02/11/2022 10:17 AM    Creatinine 0.50 (L) 02/11/2022 10:17 AM    BUN/Creatinine ratio 32 (H) 02/11/2022 10:17 AM    GFR est AA >60 02/11/2022 10:17 AM    GFR est non-AA >60 02/11/2022 10:17 AM    Calcium 9.5 02/11/2022 10:17 AM    Bilirubin, total 0.5 02/11/2022 10:17 AM    ALT (SGPT) 12 02/11/2022 10:17 AM    Alk. phosphatase 70 02/11/2022 10:17 AM    Protein, total 7.5 02/11/2022 10:17 AM    Albumin 3.9 02/11/2022 10:17 AM    Globulin 3.6 02/11/2022 10:17 AM    A-G Ratio 1.1 02/11/2022 10:17 AM      Lab Results   Component Value Date/Time    Hemoglobin A1c 6.9 (H) 08/11/2021 08:47 AM    Hemoglobin A1c (POC) 6.0 06/03/2022 08:54 AM         Review of Systems   Constitutional: Negative for malaise/fatigue. HENT: Negative for congestion. Eyes: Negative for blurred vision. Respiratory: Negative for cough and shortness of breath. Cardiovascular: Negative for chest pain, palpitations and leg swelling. Gastrointestinal: Negative for abdominal pain, constipation and heartburn. Genitourinary: Negative for dysuria, frequency and urgency. Musculoskeletal: Negative for back pain and joint pain. Neurological: Negative for dizziness, tingling and headaches. Endo/Heme/Allergies: Negative for environmental allergies. Psychiatric/Behavioral: Negative for depression. The patient does not have insomnia. Physical Exam  Vitals and nursing note reviewed. Constitutional:       Appearance: Normal appearance. She is well-developed. Comments: /70 (BP 1 Location: Left arm, BP Patient Position: Sitting)   Pulse 66   Temp 97.1 °F (36.2 °C) (Oral)   Resp 16   Ht 5' 2.5\" (1.588 m)   Wt 170 lb 3.2 oz (77.2 kg)   LMP 05/10/1990   SpO2 100%   BMI 30.63 kg/m²      HENT:      Right Ear: Tympanic membrane and ear canal normal.      Left Ear: Tympanic membrane and ear canal normal.      Nose: No mucosal edema. Neck:      Thyroid: No thyromegaly. Cardiovascular:      Rate and Rhythm: Normal rate and regular rhythm. Heart sounds: Normal heart sounds. Pulmonary:      Effort: Pulmonary effort is normal.      Breath sounds: Normal breath sounds. Abdominal:      General: Bowel sounds are normal.      Palpations: Abdomen is soft. There is no mass. Tenderness: There is no abdominal tenderness. Musculoskeletal:         General: No swelling. Normal range of motion. Cervical back: Normal range of motion and neck supple. Right lower leg: No edema. Left lower leg: No edema. Lymphadenopathy:      Cervical: No cervical adenopathy. Skin:     General: Skin is warm and dry. Neurological:      General: No focal deficit present. Mental Status: She is alert and oriented to person, place, and time. Psychiatric:         Mood and Affect: Mood normal.         ASSESSMENT and PLAN  Diagnoses and all orders for this visit:    1. Essential hypertension  Discussed sodium restriction, high k rich diet, maintaining ideal body weight and regular exercise program such as daily walking 30 min perday 4-5 times per week, as physiologic means to achieve blood pressure control. Medication compliance advised. 2. Type 2 diabetes mellitus without complication, without long-term current use of insulin ([facility])  a1c 6.0%. Continue to monitor. Work on diet and exercise. -     MICROALBUMIN, UR, RAND W/ MICROALB/CREAT RATIO; Future  -     AMB POC HEMOGLOBIN A1C  -     AMB POC GLUCOSE, QUANTITATIVE, BLOOD    3.  Mixed hyperlipidemia  Continue to monitor. Work on diet and exercise.  -     LIPID PANEL; Future    4. Encounter for medication monitoring  -     URINALYSIS W/MICROSCOPIC; Future  -     METABOLIC PANEL, BASIC; Future  -     CBC W/O DIFF; Future    5. Encounter for administration of COVID-19 vaccine  -     COVID-19, PFIZER GRAY TOP, DO NOT DILUTE, MADELAINE-SUCROSE, 12+ YRS, PF, 30MCG/0.3 ML DOSE    7. Caregiver stress  Discussed pt looking into getting her family member into assisted living or rehab facility. Discussed caution with using the marijuana. Follow-up and Dispositions    · Return in about 5 months (around 11/3/2022). reviewed diet, exercise and weight control  cardiovascular risk and specific lipid/LDL goals reviewed  reviewed medications and side effects in detail      I have discussed diagnosis listed in this note with pt and/or family. I have discussed treatment plans and options and the risk/benefit analysis of those options, including safe use of medications and possible medication side effects. Through the use of shared decision making we have agreed to the above plan. The patient has received an after-visit summary and questions were answered concerning future plans and follow up. Advise pt of any urgent changes then to proceed to the ER.

## 2022-06-04 LAB
ANION GAP SERPL CALC-SCNC: 8 MMOL/L (ref 5–15)
APPEARANCE UR: CLEAR
BACTERIA URNS QL MICRO: ABNORMAL /HPF
BILIRUB UR QL: NEGATIVE
BUN SERPL-MCNC: 17 MG/DL (ref 6–20)
BUN/CREAT SERPL: 28 (ref 12–20)
CALCIUM SERPL-MCNC: 9.6 MG/DL (ref 8.5–10.1)
CHLORIDE SERPL-SCNC: 103 MMOL/L (ref 97–108)
CHOLEST SERPL-MCNC: 210 MG/DL
CO2 SERPL-SCNC: 28 MMOL/L (ref 21–32)
COLOR UR: ABNORMAL
CREAT SERPL-MCNC: 0.61 MG/DL (ref 0.55–1.02)
CREAT UR-MCNC: 39.3 MG/DL
EPITH CASTS URNS QL MICRO: ABNORMAL /LPF
ERYTHROCYTE [DISTWIDTH] IN BLOOD BY AUTOMATED COUNT: 12.7 % (ref 11.5–14.5)
GLUCOSE SERPL-MCNC: 122 MG/DL (ref 65–100)
GLUCOSE UR STRIP.AUTO-MCNC: NEGATIVE MG/DL
HCT VFR BLD AUTO: 41.3 % (ref 35–47)
HDLC SERPL-MCNC: 69 MG/DL
HDLC SERPL: 3 {RATIO} (ref 0–5)
HGB BLD-MCNC: 12.9 G/DL (ref 11.5–16)
HGB UR QL STRIP: NEGATIVE
HYALINE CASTS URNS QL MICRO: ABNORMAL /LPF (ref 0–5)
KETONES UR QL STRIP.AUTO: NEGATIVE MG/DL
LDLC SERPL CALC-MCNC: 127 MG/DL (ref 0–100)
LEUKOCYTE ESTERASE UR QL STRIP.AUTO: ABNORMAL
MCH RBC QN AUTO: 31.5 PG (ref 26–34)
MCHC RBC AUTO-ENTMCNC: 31.2 G/DL (ref 30–36.5)
MCV RBC AUTO: 101 FL (ref 80–99)
MICROALBUMIN UR-MCNC: <0.5 MG/DL
MICROALBUMIN/CREAT UR-RTO: <13 MG/G (ref 0–30)
NITRITE UR QL STRIP.AUTO: NEGATIVE
NRBC # BLD: 0 K/UL (ref 0–0.01)
NRBC BLD-RTO: 0 PER 100 WBC
PH UR STRIP: 6.5 [PH] (ref 5–8)
PLATELET # BLD AUTO: 249 K/UL (ref 150–400)
PMV BLD AUTO: 11.4 FL (ref 8.9–12.9)
POTASSIUM SERPL-SCNC: 3.8 MMOL/L (ref 3.5–5.1)
PROT UR STRIP-MCNC: NEGATIVE MG/DL
RBC # BLD AUTO: 4.09 M/UL (ref 3.8–5.2)
RBC #/AREA URNS HPF: ABNORMAL /HPF (ref 0–5)
SODIUM SERPL-SCNC: 139 MMOL/L (ref 136–145)
SP GR UR REFRACTOMETRY: 1.01 (ref 1–1.03)
TRIGL SERPL-MCNC: 70 MG/DL (ref ?–150)
UROBILINOGEN UR QL STRIP.AUTO: 0.2 EU/DL (ref 0.2–1)
VLDLC SERPL CALC-MCNC: 14 MG/DL
WBC # BLD AUTO: 3.7 K/UL (ref 3.6–11)
WBC URNS QL MICRO: ABNORMAL /HPF (ref 0–4)

## 2022-09-06 ENCOUNTER — HOSPITAL ENCOUNTER (OUTPATIENT)
Dept: MAMMOGRAPHY | Age: 77
Discharge: HOME | End: 2022-09-06
Attending: SURGERY
Payer: MEDICARE

## 2022-09-06 DIAGNOSIS — R92.8 ABNORMAL MAMMOGRAM: ICD-10-CM

## 2022-09-06 PROCEDURE — 77066 DX MAMMO INCL CAD BI: CPT

## 2022-10-05 DIAGNOSIS — E11.9 TYPE 2 DIABETES MELLITUS WITHOUT COMPLICATION, WITHOUT LONG-TERM CURRENT USE OF INSULIN (HCC): ICD-10-CM

## 2022-10-05 RX ORDER — CALCIUM CITRATE/VITAMIN D3 200MG-6.25
TABLET ORAL
Qty: 200 STRIP | Refills: 5 | Status: SHIPPED | OUTPATIENT
Start: 2022-10-05

## 2022-10-24 ENCOUNTER — TELEPHONE (OUTPATIENT)
Dept: FAMILY MEDICINE CLINIC | Age: 77
End: 2022-10-24

## 2022-10-24 RX ORDER — PROMETHAZINE HYDROCHLORIDE AND DEXTROMETHORPHAN HYDROBROMIDE 6.25; 15 MG/5ML; MG/5ML
5 SYRUP ORAL
Qty: 180 ML | Refills: 1 | Status: SHIPPED | OUTPATIENT
Start: 2022-10-24

## 2022-10-24 RX ORDER — AMOXICILLIN 500 MG/1
500 CAPSULE ORAL 3 TIMES DAILY
Qty: 30 CAPSULE | Refills: 0 | Status: SHIPPED | OUTPATIENT
Start: 2022-10-24 | End: 2022-11-03

## 2022-10-24 NOTE — TELEPHONE ENCOUNTER
COVID test negative this evening. Has coughing and sore throat with sinus congestion. Has had sx for the past 4 to 5 days and not getting better. Will call in rx. Rest and increased fluids.

## 2022-10-24 NOTE — TELEPHONE ENCOUNTER
Pt state she has cough, congestion, itchy eyes and runny nose. Pt denies fever, chills, SOB and body aches. She does not have any COVID tests at home. Requesting something be called into her pharmacy.   [phone]

## 2022-10-24 NOTE — TELEPHONE ENCOUNTER
Patient wants to get something called in for a sinus infection.   Please give her a call @ [phone]

## 2022-11-04 ENCOUNTER — OFFICE VISIT (OUTPATIENT)
Dept: FAMILY MEDICINE CLINIC | Age: 77
End: 2022-11-04

## 2022-11-04 VITALS
BODY MASS INDEX: 30.95 KG/M2 | HEIGHT: 62 IN | WEIGHT: 168.2 LBS | DIASTOLIC BLOOD PRESSURE: 76 MMHG | TEMPERATURE: 97.9 F | OXYGEN SATURATION: 98 % | RESPIRATION RATE: 12 BRPM | SYSTOLIC BLOOD PRESSURE: 136 MMHG | HEART RATE: 62 BPM

## 2022-11-04 DIAGNOSIS — Z51.81 ENCOUNTER FOR MEDICATION MONITORING: ICD-10-CM

## 2022-11-04 DIAGNOSIS — Z23 NEEDS FLU SHOT: ICD-10-CM

## 2022-11-04 DIAGNOSIS — Z00.00 MEDICARE ANNUAL WELLNESS VISIT, SUBSEQUENT: Primary | ICD-10-CM

## 2022-11-04 DIAGNOSIS — I10 ESSENTIAL HYPERTENSION: ICD-10-CM

## 2022-11-04 DIAGNOSIS — E78.2 MIXED HYPERLIPIDEMIA: ICD-10-CM

## 2022-11-04 DIAGNOSIS — E11.9 TYPE 2 DIABETES MELLITUS WITHOUT COMPLICATION, WITHOUT LONG-TERM CURRENT USE OF INSULIN (HCC): ICD-10-CM

## 2022-11-04 DIAGNOSIS — Z23 ENCOUNTER FOR IMMUNIZATION: ICD-10-CM

## 2022-11-04 LAB
GLUCOSE POC: 135 MG/DL
HBA1C MFR BLD HPLC: 6.1 %

## 2022-11-04 NOTE — PROGRESS NOTES
Patient identified by 2 identifiers. Chief Complaint   Patient presents with    Follow-up     Verbal Order with Readback given by Destiney Mendez MD for Influenza. Given in left Deltoid without difficulty. Verbal Order with Readback given by Destiney Mendez MD for Pfizer COVID-19 Booster, 0.3 mL. Given in left deltoid without difficulty. Pt monitored for 15 minutes with no reaction. right    1. Have you been to the ER, urgent care clinic since your last visit? Hospitalized since your last visit? No    2. Have you seen or consulted any other health care providers outside of the [address] [place] [address] since your last visit? Include any pap smears or colon screening.  No

## 2022-11-04 NOTE — PROGRESS NOTES
This is a Subsequent Medicare Annual Wellness Exam (AWV) (Performed 12 months after IPPE or effective date of Medicare Part B enrollment)    I have reviewed the patient's medical history in detail and updated the computerized patient record. History     Patient Active Problem List   Diagnosis Code    Right knee DJD M17.11    Arthritis M19.90    Chronic pain G89.29    Hyperlipidemia E78.5    Hypovitaminosis D E55.9    Type 2 diabetes mellitus without complication (HCC) E81.5    Essential hypertension I10    Encounter for medication monitoring Z51.81    Ductal carcinoma in situ (DCIS) of left breast D05.12       Current Outpatient Medications   Medication Sig Dispense Refill    promethazine-dextromethorphan (PROMETHAZINE-DM) 6.25-15 mg/5 mL syrup Take 5 mL by mouth every six (6) hours as needed for Cough. 180 mL 1    True Metrix Glucose Test Strip strip USE TO TEST BLOOD SUGAR TWICE DAILY 200 Strip 5    metFORMIN (GLUCOPHAGE) 500 mg tablet TAKE 1 TABLET BY MOUTH TWICE DAILY WITH MEALS 180 Tablet 3    amLODIPine (NORVASC) 5 mg tablet TAKE 1 TABLET BY MOUTH DAILY 90 Tablet 3    losartan (COZAAR) 100 mg tablet TAKE 1 TABLET BY MOUTH DAILY 90 Tablet 3    hydroCHLOROthiazide (HYDRODIURIL) 12.5 mg tablet Take 2 Tablets by mouth daily. 180 Tablet 3    fluticasone propionate (FLONASE) 50 mcg/actuation nasal spray SHAKE LIQUID AND USE 2 SPRAYS IN EACH NOSTRIL DAILY AS NEEDED FOR RHINITIS 16 g 3    ibuprofen (MOTRIN) 800 mg tablet TAKE 1 TABLET BY MOUTH THREE TIMES DAILY AS NEEDED FOR PAIN 60 Tablet 1    cholecalciferol (VITAMIN D3) (2,000 UNITS /50 MCG) cap capsule Take 2,000 Units by mouth daily. Blood-Glucose Meter (TRUE METRIX AIR GLUCOSE METER) [facility] Use to check blood sugar twice a day, E11.9 1 Each 0    Blood Glucose Control, Low (TRUE METRIX LEVEL 1) soln Use as directed. E11.9 1 Each 3    albuterol (PROVENTIL HFA, VENTOLIN HFA, PROAIR HFA) 90 mcg/actuation inhaler Take 2 Puffs by inhalation every four (4) hours as needed for Wheezing. 1 Inhaler 11    aspirin delayed-release 81 mg tablet Take 1 Tab by mouth daily. 30 Tab 6    loratadine (CLARITIN) 10 mg tablet Take 1 tablet by mouth daily as needed for Allergies. 30 tablet 11    Lancets (PRODIGY TWIST TOP LANCET) misc Use to check blood glucose level twice a day and PRN. 200 Each 3    alcohol swabs (BD SINGLE USE SWABS REGULAR) padm Use to cleanse finger prior to checking glucose level. 200 Each 3    Omega-3-DHA-EPA-Fish Oil 1,000 (120-180) mg Cap Take 1 Capsule by mouth daily.          Allergies   Allergen Reactions    Crestor [Rosuvastatin] Myalgia         Past Medical History:   Diagnosis Date    Arthritis     knee    Cancer ([facility] [address] 2003    melanoma -right lower extremity    Chronic pain     left knee & left shoulder    Diabetes ([facility] [address]     type 2    Hypertension     Nausea & vomiting     Screen for colon cancer 10/2/2014         Past Surgical History:   Procedure Laterality Date    HX BREAST BIOPSY Right     yrs ago    HX BREAST BIOPSY Left 06/2021    HX BREAST LUMPECTOMY Left 8/18/2021    LEFT BREAST LUMPECTOMY WITH NEEDLE LOCALIZATION (1100) performed by Torie Silver MD at [facility] AMBUL[facility]ORY OR    HX CATARACT REMOVAL  2010    bilateral eyes    HX CHOLECYSTECTOMY      HX HYSTERECTOMY  1990    fibroid    HX KNEE ARTHROSCOPY  1999    left knee replacement    HX ORTHOPAEDIC      age 40- right foot    HX ORTHOPAEDIC  9/11/2015    left carpal tunnel    HX OTHER SURGICAL  2005    permanent teeth replacements    HX OTHER SURGICAL Right 2003    melonoma removed rt leg    HX ROTATOR CUFF REPAIR  09/28/2020    left    HX WISDOM TEETH EXTRACTION      [facility] DX W/COLLJ SPEC WHEN PFRMD  2007    date and md unknown         Family History   Problem Relation Age of Onset    No Known Problems Mother     No Known Problems Father     Thyroid Disease Sister     Anesth Problems Neg Hx        Social History     Tobacco Use    Smoking status: Never    Smokeless tobacco: Never   Substance Use Topics    Alcohol use: Yes     Alcohol/week: 0.0 standard drinks     Comment: RARE          Depression Risk Factor Screening:     PHQ over the last two weeks    Little interest or pleasure in doing things Not at all   Feeling down, depressed or hopeless Not at all   Total Score PHQ 2 0     Alcohol Risk Factor Screening: You do not drink alcohol or very rarely. Functional Ability and Level of Safety:   Hearing Loss  Hearing is good. Activities of Daily Living  The home contains: discussed safety equipment. Patient does total self care    Fall RiskFall Risk Assessment, last 12 mths    Able to walk? Yes   Fall in past 12 months? No     Functional Ability:   Does the patient exhibit a steady gait? yes    How long did it take the patient to get up and walk from a sitting position? seconds    Is the patient self reliant? (ie can do own laundry, meals, household chores)  yes   Does the patient handle his/her own medications? yes   Does the patient handle his/her own money? yes   Is the patients home safe (ie good lighting, handrails on stairs and bath, etc.)? yes   Did you notice or did patient express any hearing difficulties? no   Did you notice or did patient express any vision difficulties? no        Advance Care Planning:   Patient was offered the opportunity to discuss advance care planning:  yes    Does patient have an Advance Directive:  no   If no, did you provide information on Caring Connections?   yes      Abuse Screen  Patient is not abused    Cognitive Screening   Evaluation of Cognitive Function:  Has your family/caregiver stated any concerns about your memory: no      Patient Care Team   Patient Care Team:  Loki Mayorga MD as PCP - General    Assessment/Plan   Education and counseling provided:  Are appropriate based on today's review and evaluation  End-of-Life planning (with patient's consent)    ASSESSMENT and PLAN    Medicare Annual Wellness  Continue current treatment plan. Continue annual follow up. I have discussed diagnosis listed in this note with pt and/or family. I have discussed treatment plans and options and the risk/benefit analysis of those options, including safe use of medications and possible medication side effects. Through the use of shared decision making we have agreed to the above plan. The patient has received an after-visit summary and questions were answered concerning future plans and follow up. Advise pt of any urgent changes then to proceed to the ER.

## 2022-11-04 NOTE — PATIENT INSTRUCTIONS
Vaccine Information Statement    Influenza (Flu) Vaccine (Inactivated or Recombinant): What You Need to Know    Many vaccine information statements are available in [place] and other languages. See www.immunize.org/vis. Hojas de información sobre vacunas están disponibles en español y en muchos otros idiomas. Visite www.immunize.org/vis. 1. Why get vaccinated? Influenza vaccine can prevent influenza (flu). Flu is a contagious disease that spreads around the [place] [facility] every year, usually between October and May. Anyone can get the flu, but it is more dangerous for some people. Infants and young children, people 72 years and older, pregnant people, and people with certain health conditions or a weakened immune system are at greatest risk of flu complications. Pneumonia, bronchitis, sinus infections, and ear infections are examples of flu-related complications. If you have a medical condition, such as heart disease, cancer, or diabetes, flu can make it worse. Flu can cause fever and chills, sore throat, muscle aches, fatigue, cough, headache, and runny or stuffy nose. Some people may have vomiting and diarrhea, though this is more common in children than adults. In an average year, thousands of people in the [facility] die from flu, and many more are hospitalized. Flu vaccine prevents millions of illnesses and flu-related visits to the doctor each year. 2. Influenza vaccines     CDC recommends everyone 6 months and older get vaccinated every flu season. Children 6 months through 6years of age may need 2 doses during a single flu season. Everyone else needs only 1 dose each flu season. It takes about 2 weeks for protection to develop after vaccination. There are many flu viruses, and they are always changing. Each year a new flu vaccine is made to protect against the influenza viruses believed to be likely to cause disease in the upcoming flu season.  Even when the vaccine doesnt exactly match these viruses, it may still provide some protection. Influenza vaccine does not cause flu. Influenza vaccine may be given at the same time as other vaccines. 3. Talk with your health care provider    Tell your vaccination provider if the person getting the vaccine:  Has had an allergic reaction after a previous dose of influenza vaccine, or has any severe, life-threatening allergies   Has ever had Guillain-Barré Syndrome (also called GBS)    In some cases, your health care provider may decide to postpone influenza vaccination until a future visit. Influenza vaccine can be administered at any time during pregnancy. People who are or will be pregnant during influenza season should receive inactivated influenza vaccine. People with minor illnesses, such as a cold, may be vaccinated. People who are moderately or severely ill should usually wait until they recover before getting influenza vaccine. Your health care provider can give you more information. 4. Risks of a vaccine reaction    Soreness, redness, and swelling where the shot is given, fever, muscle aches, and headache can happen after influenza vaccination. There may be a very small increased risk of Guillain-Barré Syndrome (GBS) after inactivated influenza vaccine (the flu shot). Yesenia Holder children who get the flu shot along with pneumococcal vaccine (PCV13) and/or DTaP vaccine at the same time might be slightly more likely to have a seizure caused by fever. Tell your health care provider if a child who is getting flu vaccine has ever had a seizure. People sometimes faint after medical procedures, including vaccination. Tell your provider if you feel dizzy or have vision changes or ringing in the ears. As with any medicine, there is a very remote chance of a vaccine causing a severe allergic reaction, other serious injury, or death. 5. What if there is a serious problem?     An allergic reaction could occur after the vaccinated person leaves the clinic. If you see signs of a severe allergic reaction (hives, swelling of the face and throat, difficulty breathing, a fast heartbeat, dizziness, or weakness), call 9-1-1 and get the person to the nearest hospital.    For other signs that concern you, call your health care provider. Adverse reactions should be reported to the Vaccine Adverse Event Reporting System (VAERS). Your health care provider will usually file this report, or you can do it yourself. Visit the VAERS website at www.vaers. [facility].gov or call 5-727.533.6890. VAERS is only for reporting reactions, and VAERS staff members do not give medical advice. 6. The National Vaccine Injury Compensation Program    The [facility] Vaccine Injury Compensation Program (VICP) is a federal program that was created to compensate people who may have been injured by certain vaccines. Claims regarding alleged injury or death due to vaccination have a time limit for filing, which may be as short as two years. Visit the VICP website at www.[facility].gov/vaccinecompensation or call 1-423.708.8959 to learn about the program and about filing a claim. 7. How can I learn more? Ask your health care provider. Call your local or state health department. Visit the website of the Food and Drug Administration (FDA) for vaccine package inserts and additional information at www.fda.gov/vaccines-blood-biologics/vaccines. Contact the Centers for Disease Control and Prevention (CDC): Call 7-612.470.1911 (5-946-YYX-INFO) or  Visit CDCs influenza website at www.cdc.gov/flu. Vaccine Information Statement   Inactivated Influenza Vaccine   8/6/2021  [address] 226NS-86   Department of Health and Human Services  Centers for Disease Control and Prevention    Office Use Only

## 2022-11-04 NOTE — PROGRESS NOTES
HISTORY OF PRESENT ILLNESS  Earnest No is a 69 y.o. female. Follow up on chronic medical problems. Doing the precautionary measures at home to reduce risks of exposure COVID19. Also wearing mask when she is going out. No known sick contacts or known exposure to [address]. Had lumpectomy 2021 for left breast cancer and doing well. No chemo or radiation needed. Increased stress at home with caring for her sister-in law who has multiple medical problems and on dialysis. Not sure how much longer she will be hanny to keep her in her home. She is using marijuana to help her to relax from the stress. She sprinkles it on her food in the evenings. HTN follow up:  Compliant w/ meds, low salt diet, and exercise. Has home bp monitorin-160/70-80s  No swelling, headache or dizziness. No chest pain, SOB, palpitations. Otherwise feeling well since the last visit. DM type II follow up:  Compliant w/ meds, diabetic diet, and exercise. Obtains home glucose monitoring averaging 100-140s. Checks BS BID on most days and prn. Pt does not have BS log at visit today. No Rf needed for today. Tingling sensation in the foot and toes which comes and goes. Started on nerve reverse by her podiatrist but she has not yet started taking it. Denies polyuria and polydipsia. No blurred vision. No significant weight changes. Hypercholesterolemia follow up:  Compliant w/ meds, low fat, low cholesterol diet. Stopped crestor d/t muscle aching. Exercising some. No abdominal pain, no skin discoloration. Patient fasting today. HM:   Mammogram 2022   Eye exam 2021   Colonoscopy 3/4/2016 by Dr. Julien Zavala- repeat in 10 years.     Patient Active Problem List   Diagnosis Code    Right knee DJD M17.11    Arthritis M19.90    Chronic pain G89.29    Hyperlipidemia E78.5    Hypovitaminosis D E55.9    Type 2 diabetes mellitus without complication ([address] U94.9    Essential hypertension I10    Encounter for medication monitoring Z51.81    Ductal carcinoma in situ (DCIS) of left breast D05.12       Current Outpatient Medications   Medication Sig Dispense Refill    promethazine-dextromethorphan (PROMETHAZINE-DM) 6.25-15 mg/5 mL syrup Take 5 mL by mouth every six (6) hours as needed for Cough. 180 mL 1    True Metrix Glucose Test Strip strip USE TO TEST BLOOD SUGAR TWICE DAILY 200 Strip 5    metFORMIN (GLUCOPHAGE) 500 mg tablet TAKE 1 TABLET BY MOUTH TWICE DAILY WITH MEALS 180 Tablet 3    amLODIPine (NORVASC) 5 mg tablet TAKE 1 TABLET BY MOUTH DAILY 90 Tablet 3    losartan (COZAAR) 100 mg tablet TAKE 1 TABLET BY MOUTH DAILY 90 Tablet 3    hydroCHLOROthiazide (HYDRODIURIL) 12.5 mg tablet Take 2 Tablets by mouth daily. 180 Tablet 3    fluticasone propionate (FLONASE) 50 mcg/actuation nasal spray SHAKE LIQUID AND USE 2 SPRAYS IN EACH NOSTRIL DAILY AS NEEDED FOR RHINITIS 16 g 3    ibuprofen (MOTRIN) 800 mg tablet TAKE 1 TABLET BY MOUTH THREE TIMES DAILY AS NEEDED FOR PAIN 60 Tablet 1    cholecalciferol (VITAMIN D3) (2,000 UNITS /50 MCG) cap capsule Take 2,000 Units by mouth daily. Blood-Glucose Meter (TRUE METRIX AIR GLUCOSE METER) [facility] Use to check blood sugar twice a day, E11.9 1 Each 0    Blood Glucose Control, Low (TRUE METRIX LEVEL 1) soln Use as directed. E11.9 1 Each 3    albuterol (PROVENTIL HFA, VENTOLIN HFA, PROAIR HFA) 90 mcg/actuation inhaler Take 2 Puffs by inhalation every four (4) hours as needed for Wheezing. 1 Inhaler 11    aspirin delayed-release 81 mg tablet Take 1 Tab by mouth daily. 30 Tab 6    loratadine (CLARITIN) 10 mg tablet Take 1 tablet by mouth daily as needed for Allergies. 30 tablet 11    Lancets (PRODIGY TWIST TOP LANCET) misc Use to check blood glucose level twice a day and PRN. 200 Each 3    alcohol swabs (BD SINGLE USE SWABS REGULAR) padm Use to cleanse finger prior to checking glucose level. 200 Each 3    Omega-3-DHA-EPA-Fish Oil 1,000 (120-180) mg Cap Take 1 Capsule by mouth daily. Allergies   Allergen Reactions    Crestor [Rosuvastatin] Myalgia         Past Medical History:   Diagnosis Date    Arthritis     knee    Cancer ([facility] [address] 2003    melanoma -right lower extremity    Chronic pain     left knee & left shoulder    Diabetes ([facility] [address]     type 2    Hypertension     Nausea & vomiting     Screen for colon cancer 10/2/2014         Past Surgical History:   Procedure Laterality Date    HX BREAST BIOPSY Right     yrs ago    HX BREAST BIOPSY Left 06/2021    HX BREAST LUMPECTOMY Left 8/18/2021    LEFT BREAST LUMPECTOMY WITH NEEDLE LOCALIZATION (1100) performed by Arminda Chaparro MD at [facility] AMBULATORY OR    HX CATARACT REMOVAL  2010    bilateral eyes    HX CHOLECYSTECTOMY      HX HYSTERECTOMY  1990    fibroid    HX KNEE ARTHROSCOPY  1999    left knee replacement    HX ORTHOPAEDIC      age 40- right foot    HX ORTHOPAEDIC  9/11/2015    left carpal tunnel    HX OTHER SURGICAL  2005    permanent teeth replacements    HX OTHER SURGICAL Right 2003    melonoma removed rt leg    HX ROTATOR CUFF REPAIR  09/28/2020    left    HX WISDOM TEETH EXTRACTION      [place] COLONOSCOPY FLX DX W/COLLJ SPEC WHEN PFRMD  2007    date and md unknown           Family History   Problem Relation Age of Onset    No Known Problems Mother     No Known Problems Father     Thyroid Disease Sister     Anesth Problems Neg Hx        Social History     Tobacco Use    Smoking status: Never    Smokeless tobacco: Never   Substance Use Topics    Alcohol use:  Yes     Alcohol/week: 0.0 standard drinks     Comment: RARE         Lab Results   Component Value Date/Time    WBC 3.7 06/03/2022 09:16 AM    HGB 12.9 06/03/2022 09:16 AM    Hemoglobin (POC) 15.3 12/27/2009 07:16 AM    HCT 41.3 06/03/2022 09:16 AM    Hematocrit (POC) 45 12/27/2009 07:16 AM    PLATELET 000 16/61/2973 09:16 AM    .0 (H) 06/03/2022 09:16 AM     Lab Results   Component Value Date/Time    Cholesterol, total 210 (H) 06/03/2022 09:16 AM    HDL Cholesterol 69 06/03/2022 09:16 AM    LDL, calculated 127 (H) 06/03/2022 09:16 AM    Triglyceride 70 06/03/2022 09:16 AM    CHOL/HDL Ratio 3.0 06/03/2022 09:16 AM     Lab Results   Component Value Date/Time    TSH 2.220 09/05/2017 09:57 AM      Lab Results   Component Value Date/Time    Sodium 139 06/03/2022 09:16 AM    Potassium 3.8 06/03/2022 09:16 AM    Chloride 103 06/03/2022 09:16 AM    CO2 28 06/03/2022 09:16 AM    Anion gap 8 06/03/2022 09:16 AM    Glucose 122 (H) 06/03/2022 09:16 AM    BUN 17 06/03/2022 09:16 AM    Creatinine 0.61 06/03/2022 09:16 AM    BUN/Creatinine ratio 28 (H) 06/03/2022 09:16 AM    GFR est AA >60 06/03/2022 09:16 AM    GFR est non-AA >60 06/03/2022 09:16 AM    Calcium 9.6 06/03/2022 09:16 AM    Bilirubin, total 0.5 02/11/2022 10:17 AM    ALT (SGPT) 12 02/11/2022 10:17 AM    Alk. phosphatase 70 02/11/2022 10:17 AM    Protein, total 7.5 02/11/2022 10:17 AM    Albumin 3.9 02/11/2022 10:17 AM    Globulin 3.6 02/11/2022 10:17 AM    A-G Ratio 1.1 02/11/2022 10:17 AM      Lab Results   Component Value Date/Time    Hemoglobin A1c 6.9 (H) 08/11/2021 08:47 AM    Hemoglobin A1c (POC) 6.0 06/03/2022 08:54 AM         Review of Systems   Constitutional:  Negative for malaise/fatigue. HENT:  Negative for congestion. Eyes:  Negative for blurred vision. Respiratory:  Negative for cough and shortness of breath. Cardiovascular:  Negative for chest pain, palpitations and leg swelling. Gastrointestinal:  Negative for abdominal pain, constipation and heartburn. Genitourinary:  Negative for dysuria, frequency and urgency. Musculoskeletal:  Negative for back pain and joint pain. Neurological:  Negative for dizziness, tingling and headaches. Endo/Heme/Allergies:  Negative for environmental allergies. Psychiatric/Behavioral:  Negative for depression. The patient does not have insomnia. Physical Exam  Vitals and nursing note reviewed. Constitutional:       Appearance: Normal appearance.  She is well-developed. Comments: /76   Pulse 62   Temp 97.9 °F (36.6 °C)   Resp 12   Ht 5' 2\" (1.575 m)   Wt 168 lb 3.2 oz (76.3 kg)   LMP 05/10/1990   SpO2 98%   BMI 30.76 kg/m²    HENT:      Right Ear: Tympanic membrane and ear canal normal.      Left Ear: Tympanic membrane and ear canal normal.   Neck:      Thyroid: No thyromegaly. Cardiovascular:      Rate and Rhythm: Normal rate and regular rhythm. Heart sounds: Normal heart sounds. Pulmonary:      Effort: Pulmonary effort is normal.      Breath sounds: Normal breath sounds. Abdominal:      General: Bowel sounds are normal.      Palpations: Abdomen is soft. There is no mass. Tenderness: There is no abdominal tenderness. Musculoskeletal:         General: Normal range of motion. Cervical back: Normal range of motion and neck supple. Right lower leg: No edema. Left lower leg: No edema. Lymphadenopathy:      Cervical: No cervical adenopathy. Skin:     General: Skin is warm and dry. Neurological:      General: No focal deficit present. Mental Status: She is alert and oriented to person, place, and time. Psychiatric:         Mood and Affect: Mood normal.       ASSESSMENT and PLAN  Diagnoses and all orders for this visit:    1. Medicare annual wellness visit, subsequent    2. Essential hypertension  Discussed sodium restriction, high k rich diet, maintaining ideal body weight and regular exercise program such as daily walking 30 min perday 4-5 times per week, as physiologic means to achieve blood pressure control. Medication compliance advised. 3. Type 2 diabetes mellitus without complication, without long-term current use of insulin ([facility])  A1c stable at 6.1%. Continue to monitor. Work on diet and exercise. -     AMB POC HEMOGLOBIN A1C  -     AMB POC GLUCOSE, QUANTITATIVE, BLOOD    4. Mixed hyperlipidemia  Continue to monitor. Work on diet and exercise.  -     LIPID PANEL; Future    5.  Encounter for medication monitoring  -     METABOLIC PANEL, COMPREHENSIVE; Future      Follow-up and Dispositions    Return in about 6 months (around 5/4/2023). reviewed diet, exercise and weight control  cardiovascular risk and specific lipid/LDL goals reviewed  reviewed medications and side effects in detail  specific diabetic recommendations: low cholesterol diet, weight control and daily exercise discussed, all medications, side effects and compliance discussed carefully, foot care discussed and Podiatry visits discussed, annual eye examinations at Ophthalmology discussed, and glycohemoglobin and other lab monitoring discussed      I have discussed diagnosis listed in this note with pt and/or family. I have discussed treatment plans and options and the risk/benefit analysis of those options, including safe use of medications and possible medication side effects. Through the use of shared decision making we have agreed to the above plan. The patient has received an after-visit summary and questions were answered concerning future plans and follow up. Advise pt of any urgent changes then to proceed to the ER.

## 2022-11-05 LAB
ALBUMIN SERPL-MCNC: 3.8 G/DL (ref 3.5–5)
ALBUMIN/GLOB SERPL: 1.1 {RATIO} (ref 1.1–2.2)
ALP SERPL-CCNC: 63 U/L (ref 45–117)
ALT SERPL-CCNC: 14 U/L (ref 12–78)
ANION GAP SERPL CALC-SCNC: 5 MMOL/L (ref 5–15)
AST SERPL-CCNC: 9 U/L (ref 15–37)
BILIRUB SERPL-MCNC: 0.5 MG/DL (ref 0.2–1)
BUN SERPL-MCNC: 16 MG/DL (ref 6–20)
BUN/CREAT SERPL: 27 (ref 12–20)
CALCIUM SERPL-MCNC: 9.4 MG/DL (ref 8.5–10.1)
CHLORIDE SERPL-SCNC: 104 MMOL/L (ref 97–108)
CHOLEST SERPL-MCNC: 211 MG/DL
CO2 SERPL-SCNC: 31 MMOL/L (ref 21–32)
CREAT SERPL-MCNC: 0.59 MG/DL (ref 0.55–1.02)
GLOBULIN SER CALC-MCNC: 3.4 G/DL (ref 2–4)
GLUCOSE SERPL-MCNC: 145 MG/DL (ref 65–100)
HDLC SERPL-MCNC: 69 MG/DL
HDLC SERPL: 3.1 {RATIO} (ref 0–5)
LDLC SERPL CALC-MCNC: 130.8 MG/DL (ref 0–100)
POTASSIUM SERPL-SCNC: 4.3 MMOL/L (ref 3.5–5.1)
PROT SERPL-MCNC: 7.2 G/DL (ref 6.4–8.2)
SODIUM SERPL-SCNC: 140 MMOL/L (ref 136–145)
TRIGL SERPL-MCNC: 56 MG/DL (ref ?–150)
VLDLC SERPL CALC-MCNC: 11.2 MG/DL

## 2022-11-25 DIAGNOSIS — E11.9 TYPE 2 DIABETES MELLITUS WITHOUT COMPLICATION, WITHOUT LONG-TERM CURRENT USE OF INSULIN (HCC): ICD-10-CM

## 2022-11-25 NOTE — TELEPHONE ENCOUNTER
Last Visit: 11/4/22 with MD Saurav Sol  Next Appointment: 5/5/23 with MD Mauro    Requested Prescriptions     Pending Prescriptions Disp Refills    Blood-Glucose Meter (True Metrix Glucose Meter) misc 1 Each 0     Sig: USE TO TEST BLOOD SUGAR TWICE DAILY   Dx E11.9    lancets (TRUEplus Lancets) 33 gauge misc 200 Lancet 3     Sig: USE TO TEST BLOOD SUGAR TWICE DAILY   Dx E11.9    glucose blood VI test strips (True Metrix Glucose Test Strip) strip 200 Strip 3     Sig: USE TO TEST BLOOD SUGAR TWICE DAILY   Dx E11.9         For Pharmacy [address] [facility] in place:   Recommendation Provided To:    Intervention Detail: New Rx: 5, reason: Patient Preference  Gap Closed?:   Intervention Accepted By:   Time Spent (min): 5

## 2022-11-28 RX ORDER — BLOOD-GLUCOSE METER
EACH MISCELLANEOUS
Qty: 1 EACH | Refills: 3 | Status: SHIPPED | OUTPATIENT
Start: 2022-11-28

## 2022-11-28 RX ORDER — LANCETS 33 GAUGE
EACH MISCELLANEOUS
Qty: 200 LANCET | Refills: 3 | Status: SHIPPED | OUTPATIENT
Start: 2022-11-28

## 2022-11-28 RX ORDER — CALCIUM CITRATE/VITAMIN D3 200MG-6.25
TABLET ORAL
Qty: 200 STRIP | Refills: 3 | Status: SHIPPED | OUTPATIENT
Start: 2022-11-28

## 2022-11-28 RX ORDER — BLOOD-GLUCOSE METER
EACH MISCELLANEOUS
Qty: 1 EACH | Refills: 0 | Status: SHIPPED | OUTPATIENT
Start: 2022-11-28

## 2022-11-28 RX ORDER — ISOPROPYL ALCOHOL 70 ML/100ML
SWAB TOPICAL
Qty: 200 PAD | Refills: 3 | Status: SHIPPED | OUTPATIENT
Start: 2022-11-28

## 2022-11-28 NOTE — TELEPHONE ENCOUNTER
Pharmacy is requesting 2 more Rx's    Requested Prescriptions     Pending Prescriptions Disp Refills    alcohol swabs (BD Single Use Swabs Regular) padm 200 Pad 3     Sig: USE TO TEST BLOOD SUGAR TWICE DAILY   Dx E11.9    Blood Glucose Control, Low (True Metrix Level 1) soln 1 Each 3     Sig: USE TO TEST BLOOD SUGAR TWICE DAILY   Dx E11.9     Signed Prescriptions Disp Refills    Blood-Glucose Meter (True Metrix Glucose Meter) misc 1 Each 0     Sig: USE TO TEST BLOOD SUGAR TWICE DAILY   Dx E11.9     Authorizing Provider: ANGELI BARKSDALE    lancets (TRUEplus Lancets) 33 gauge misc 200 Lancet 3     Sig: USE TO TEST BLOOD SUGAR TWICE DAILY   Dx E11.9     Authorizing Provider: Fadi JACOBS    glucose blood VI test strips (True Metrix Glucose Test Strip) strip 200 Strip 3     Sig: USE TO TEST BLOOD SUGAR TWICE DAILY   Dx E11.9     Authorizing Provider: Fadi Back

## 2022-12-28 DIAGNOSIS — Z91.09 ENVIRONMENTAL ALLERGIES: ICD-10-CM

## 2022-12-28 RX ORDER — FLUTICASONE PROPIONATE 50 MCG
SPRAY, SUSPENSION (ML) NASAL
Qty: 16 G | Refills: 3 | Status: SHIPPED | OUTPATIENT
Start: 2022-12-28

## 2023-03-17 ENCOUNTER — OFFICE VISIT (OUTPATIENT)
Dept: SURGERY | Age: 78
End: 2023-03-17
Payer: MEDICARE

## 2023-03-17 VITALS
DIASTOLIC BLOOD PRESSURE: 63 MMHG | BODY MASS INDEX: 30.29 KG/M2 | SYSTOLIC BLOOD PRESSURE: 154 MMHG | WEIGHT: 164.6 LBS | HEIGHT: 62 IN | HEART RATE: 63 BPM

## 2023-03-17 DIAGNOSIS — Z85.3 HISTORY OF LEFT BREAST CANCER: Primary | ICD-10-CM

## 2023-03-17 PROCEDURE — 1101F PT FALLS ASSESS-DOCD LE1/YR: CPT | Performed by: SURGERY

## 2023-03-17 PROCEDURE — G8427 DOCREV CUR MEDS BY ELIG CLIN: HCPCS | Performed by: SURGERY

## 2023-03-17 PROCEDURE — 1123F ACP DISCUSS/DSCN MKR DOCD: CPT | Performed by: SURGERY

## 2023-03-17 PROCEDURE — 3074F SYST BP LT 130 MM HG: CPT | Performed by: SURGERY

## 2023-03-17 PROCEDURE — G8399 PT W/DXA RESULTS DOCUMENT: HCPCS | Performed by: SURGERY

## 2023-03-17 PROCEDURE — 1090F PRES/ABSN URINE INCON ASSESS: CPT | Performed by: SURGERY

## 2023-03-17 PROCEDURE — G8536 NO DOC ELDER MAL SCRN: HCPCS | Performed by: SURGERY

## 2023-03-17 PROCEDURE — 3078F DIAST BP <80 MM HG: CPT | Performed by: SURGERY

## 2023-03-17 PROCEDURE — G8432 DEP SCR NOT DOC, RNG: HCPCS | Performed by: SURGERY

## 2023-03-17 PROCEDURE — 99213 OFFICE O/P EST LOW 20 MIN: CPT | Performed by: SURGERY

## 2023-03-17 PROCEDURE — G8417 CALC BMI ABV UP PARAM F/U: HCPCS | Performed by: SURGERY

## 2023-03-17 RX ORDER — LOSARTAN POTASSIUM 100 MG/1
TABLET ORAL
Qty: 90 TABLET | Refills: 3 | Status: SHIPPED | OUTPATIENT
Start: 2023-03-17

## 2023-03-17 NOTE — PROGRESS NOTES
HISTORY OF PRESENT ILLNESS  Minus Montana is a 69 y.o. female. HPI ESTABLISHED patient here for annual follow up LEFT breast cancer. Patient reports all is well. No issues. 06/24/21: LEFT breast bx, 3:00. PATH: DCIS, 5mm largest single glass slide measurement, papillary and cribriform, nuclear grade 2 with focal necrosis, ER+(100%). Clinical stage 0.    08/18/21: LEFT breast lumpectomy. PATH:  Lumpectomy: Radial scar/complex sclerosing lesion with LCIS, intraductal papilloma, usual ductal hyperplasia, apocrine   metaplasia, and microcyst formation. No DCIS identified. Inferior medial margin excision: DCIS, 25mm, nuclear grade 2, cribriform and papillary types with associated comedo-type necrosis and microcalcifications, negative margins, pathologic stage pTis pNX. Adjacent intraductal papilloma, usual ductal hyperplasia, apocrine metaplasia, and microcyst formation. DCISionRT: Elevated risk, 3.7. [facility] Results (most recent):  Results from [place] encounter on 09/06/22    [facility] MAMMO BI DX INCL CAD    Narrative  STUDY:  Bilateral Diagnostic Digital Mammography    INDICATION:  Left lumpectomy for carcinoma August 2021    COMPARISON:  Prior mammograms [id number], 2017    BREAST COMPOSITION: There are scattered fibroglandular densities. FINDINGS: Bilateral digital diagnostic mammography was performed, and is  interpreted in conjunction with a computer assisted detection (CAD) system. Lumpectomy changes are noted in the left breast upper outer quadrant. No new  masses or suspicious calcifications are identified. In the right breast there  are no suspicious masses or microcalcifications. Impression  1. BI-RADS Assessment Category 2: Benign finding. Left lumpectomy scar is  benign. Recommendation:  Bilateral diagnostic mammograms in one year. The findings of this exam and the recommendation were directly discussed with  the patient at the time of exam by myself.       ROS    Physical Exam    ASSESSMENT and PLAN  {ASSESSMENT/PLAN:14697}

## 2023-03-21 DIAGNOSIS — I10 ESSENTIAL HYPERTENSION: ICD-10-CM

## 2023-03-21 RX ORDER — AMLODIPINE BESYLATE 5 MG/1
TABLET ORAL
Qty: 90 TABLET | Refills: 3 | Status: SHIPPED | OUTPATIENT
Start: 2023-03-21

## 2023-04-20 DIAGNOSIS — I10 ESSENTIAL HYPERTENSION: ICD-10-CM

## 2023-04-20 RX ORDER — HYDROCHLOROTHIAZIDE 12.5 MG/1
25 TABLET ORAL DAILY
Qty: 180 TABLET | Refills: 3 | Status: SHIPPED | OUTPATIENT
Start: 2023-04-20

## 2023-05-05 ENCOUNTER — OFFICE VISIT (OUTPATIENT)
Dept: FAMILY MEDICINE CLINIC | Age: 78
End: 2023-05-05

## 2023-05-05 VITALS
TEMPERATURE: 97.7 F | HEIGHT: 62 IN | OXYGEN SATURATION: 100 % | WEIGHT: 165 LBS | RESPIRATION RATE: 12 BRPM | DIASTOLIC BLOOD PRESSURE: 68 MMHG | BODY MASS INDEX: 30.36 KG/M2 | SYSTOLIC BLOOD PRESSURE: 129 MMHG | HEART RATE: 70 BPM

## 2023-05-05 DIAGNOSIS — F43.23 ADJUSTMENT DISORDER WITH MIXED ANXIETY AND DEPRESSED MOOD: ICD-10-CM

## 2023-05-05 DIAGNOSIS — I10 ESSENTIAL HYPERTENSION: Primary | ICD-10-CM

## 2023-05-05 DIAGNOSIS — Z23 ENCOUNTER FOR IMMUNIZATION: ICD-10-CM

## 2023-05-05 DIAGNOSIS — E11.9 TYPE 2 DIABETES MELLITUS WITHOUT COMPLICATION, WITHOUT LONG-TERM CURRENT USE OF INSULIN (HCC): ICD-10-CM

## 2023-05-05 DIAGNOSIS — Z51.81 ENCOUNTER FOR MEDICATION MONITORING: ICD-10-CM

## 2023-05-05 DIAGNOSIS — E78.2 MIXED HYPERLIPIDEMIA: ICD-10-CM

## 2023-05-05 LAB
GLUCOSE POC: 119 MG/DL
HBA1C MFR BLD HPLC: 6.1 %

## 2023-05-05 RX ORDER — SERTRALINE HYDROCHLORIDE 25 MG/1
25 TABLET, FILM COATED ORAL DAILY
Qty: 30 TABLET | Refills: 3 | Status: SHIPPED | OUTPATIENT
Start: 2023-05-05

## 2023-05-05 RX ORDER — PRAVASTATIN SODIUM 20 MG/1
20 TABLET ORAL
Qty: 30 TABLET | Refills: 3 | Status: SHIPPED | OUTPATIENT
Start: 2023-05-05

## 2023-07-10 ENCOUNTER — OFFICE VISIT (OUTPATIENT)
Age: 78
End: 2023-07-10
Payer: MEDICARE

## 2023-07-10 VITALS
SYSTOLIC BLOOD PRESSURE: 136 MMHG | TEMPERATURE: 98.2 F | HEIGHT: 62 IN | RESPIRATION RATE: 16 BRPM | DIASTOLIC BLOOD PRESSURE: 72 MMHG | BODY MASS INDEX: 30.22 KG/M2 | HEART RATE: 60 BPM | WEIGHT: 164.2 LBS | OXYGEN SATURATION: 99 %

## 2023-07-10 DIAGNOSIS — E78.2 MIXED HYPERLIPIDEMIA: ICD-10-CM

## 2023-07-10 DIAGNOSIS — Z79.899 ENCOUNTER FOR LONG-TERM (CURRENT) USE OF MEDICATIONS: ICD-10-CM

## 2023-07-10 DIAGNOSIS — E11.42 TYPE 2 DIABETES MELLITUS WITH DIABETIC POLYNEUROPATHY, WITHOUT LONG-TERM CURRENT USE OF INSULIN (HCC): ICD-10-CM

## 2023-07-10 DIAGNOSIS — F43.23 ADJUSTMENT DISORDER WITH MIXED ANXIETY AND DEPRESSED MOOD: ICD-10-CM

## 2023-07-10 DIAGNOSIS — I10 ESSENTIAL (PRIMARY) HYPERTENSION: Primary | ICD-10-CM

## 2023-07-10 PROCEDURE — G8417 CALC BMI ABV UP PARAM F/U: HCPCS | Performed by: FAMILY MEDICINE

## 2023-07-10 PROCEDURE — G8427 DOCREV CUR MEDS BY ELIG CLIN: HCPCS | Performed by: FAMILY MEDICINE

## 2023-07-10 PROCEDURE — 1123F ACP DISCUSS/DSCN MKR DOCD: CPT | Performed by: FAMILY MEDICINE

## 2023-07-10 PROCEDURE — 3078F DIAST BP <80 MM HG: CPT | Performed by: FAMILY MEDICINE

## 2023-07-10 PROCEDURE — 1090F PRES/ABSN URINE INCON ASSESS: CPT | Performed by: FAMILY MEDICINE

## 2023-07-10 PROCEDURE — 99214 OFFICE O/P EST MOD 30 MIN: CPT | Performed by: FAMILY MEDICINE

## 2023-07-10 PROCEDURE — 1036F TOBACCO NON-USER: CPT | Performed by: FAMILY MEDICINE

## 2023-07-10 PROCEDURE — 3075F SYST BP GE 130 - 139MM HG: CPT | Performed by: FAMILY MEDICINE

## 2023-07-10 PROCEDURE — G8399 PT W/DXA RESULTS DOCUMENT: HCPCS | Performed by: FAMILY MEDICINE

## 2023-07-10 SDOH — ECONOMIC STABILITY: HOUSING INSECURITY
IN THE LAST 12 MONTHS, WAS THERE A TIME WHEN YOU DID NOT HAVE A STEADY PLACE TO SLEEP OR SLEPT IN A SHELTER (INCLUDING NOW)?: NO

## 2023-07-10 SDOH — ECONOMIC STABILITY: FOOD INSECURITY: WITHIN THE PAST 12 MONTHS, YOU WORRIED THAT YOUR FOOD WOULD RUN OUT BEFORE YOU GOT MONEY TO BUY MORE.: NEVER TRUE

## 2023-07-10 SDOH — ECONOMIC STABILITY: FOOD INSECURITY: WITHIN THE PAST 12 MONTHS, THE FOOD YOU BOUGHT JUST DIDN'T LAST AND YOU DIDN'T HAVE MONEY TO GET MORE.: NEVER TRUE

## 2023-07-10 SDOH — ECONOMIC STABILITY: INCOME INSECURITY: HOW HARD IS IT FOR YOU TO PAY FOR THE VERY BASICS LIKE FOOD, HOUSING, MEDICAL CARE, AND HEATING?: NOT HARD AT ALL

## 2023-07-10 ASSESSMENT — PATIENT HEALTH QUESTIONNAIRE - PHQ9
SUM OF ALL RESPONSES TO PHQ QUESTIONS 1-9: 0
SUM OF ALL RESPONSES TO PHQ9 QUESTIONS 1 & 2: 0
SUM OF ALL RESPONSES TO PHQ QUESTIONS 1-9: 0
2. FEELING DOWN, DEPRESSED OR HOPELESS: 0
1. LITTLE INTEREST OR PLEASURE IN DOING THINGS: 0

## 2023-07-10 ASSESSMENT — ENCOUNTER SYMPTOMS
SHORTNESS OF BREATH: 0
RHINORRHEA: 0
CONSTIPATION: 0
ABDOMINAL PAIN: 0
CHEST TIGHTNESS: 0
BLOOD IN STOOL: 0
COUGH: 0

## 2023-07-10 NOTE — PROGRESS NOTES
Chief Complaint   Patient presents with    Follow-up     Hypertension, diabetes       1. \"Have you been to the ER, urgent care clinic since your last visit? Hospitalized since your last visit? \" No    2. \"Have you seen or consulted any other health care providers outside of the [address] [place] [address] since your last visit? \" No     3. For patients aged 43-73: Has the patient had a colonoscopy / FIT/ Cologuard? Yes      If the patient is female:    4. For patients aged 43-66: Has the patient had a mammogram within the past 2 years? N/A      5. For patients aged 21-65: Has the patient had a pap smear?  N/A      Health Maintenance Due   Topic Date Due    DTaP/Tdap/Td vaccine (2 - Td or Tdap) 02/10/2022    Diabetic foot exam  09/29/2022

## 2023-07-10 NOTE — PROGRESS NOTES
Subjective:      Patient ID: Yohan Obregon is a 69 y.o. female. HPI  Follow up on  Increased stress at home with caring for her sister-in law who has multiple medical problems and on dialysis. She is now in a long term care facility. Her  has been diagnosed with dementia. She had been feeling more anxious and stress but overall is handling it much better. Decided not to take the Zoloft. She is going to a Alzheimer's support group which has been helpful. Has not been feeling depressed as before. Overall sleeping much better and taking the time out for herself. Has good family support. Has trouble  sleeping at night d/t worrying about her . She does not think that she needs to see a therapist at this point. No SI/HI. HTN follow up:   Compliant w/ meds, low salt diet, and exercise. Has home bp monitorin-130s/70-80s   No swelling, headache or dizziness. No chest pain, SOB, palpitations. Otherwise feeling well since the last visit. DM type II follow up:   Compliant w/ meds, diabetic diet, and exercise. Obtains home glucose monitoring averaging 100-140s. No hypoglycemia. Checks BS BID on most days and prn. Pt does not have BS log at visit today. No Rf needed for today. Tingling sensation in the foot and toes which comes and goes. Denies polyuria and polydipsia. No blurred vision. No significant weight changes. Hypercholesterolemia follow up:   Compliant w/ meds, low fat, low cholesterol diet. Stopped crestor d/t muscle aching. We discussed her LDL cholesterol and decided to try another statin. Exercising some. No abdominal pain, no skin  discoloration. Patient fasting today. HM:    Mammogram 2022    Eye exam 2022    Colonoscopy 3/4/2016 by Dr. Jorge Cuellar- repeat in 10 years.     Past Medical History:   Diagnosis Date    Arthritis     knee    Cancer ([address])     melanoma -right lower extremity    Chronic pain     left knee & left shoulder

## 2023-07-11 LAB
ALBUMIN SERPL-MCNC: 4 G/DL (ref 3.5–5)
ALBUMIN/GLOB SERPL: 1.2 (ref 1.1–2.2)
ALP SERPL-CCNC: 64 U/L (ref 45–117)
ALT SERPL-CCNC: 11 U/L (ref 12–78)
ANION GAP SERPL CALC-SCNC: 7 MMOL/L (ref 5–15)
AST SERPL-CCNC: 10 U/L (ref 15–37)
BILIRUB SERPL-MCNC: 0.6 MG/DL (ref 0.2–1)
BUN SERPL-MCNC: 16 MG/DL (ref 6–20)
BUN/CREAT SERPL: 33 (ref 12–20)
CALCIUM SERPL-MCNC: 10.2 MG/DL (ref 8.5–10.1)
CHLORIDE SERPL-SCNC: 107 MMOL/L (ref 97–108)
CHOLEST SERPL-MCNC: 219 MG/DL
CO2 SERPL-SCNC: 28 MMOL/L (ref 21–32)
CREAT SERPL-MCNC: 0.49 MG/DL (ref 0.55–1.02)
ERYTHROCYTE [DISTWIDTH] IN BLOOD BY AUTOMATED COUNT: 11.9 % (ref 11.5–14.5)
GLOBULIN SER CALC-MCNC: 3.3 G/DL (ref 2–4)
GLUCOSE SERPL-MCNC: 119 MG/DL (ref 65–100)
HCT VFR BLD AUTO: 40.5 % (ref 35–47)
HDLC SERPL-MCNC: 78 MG/DL
HDLC SERPL: 2.8 (ref 0–5)
HGB BLD-MCNC: 13.1 G/DL (ref 11.5–16)
LDLC SERPL CALC-MCNC: 127.8 MG/DL (ref 0–100)
MCH RBC QN AUTO: 30.9 PG (ref 26–34)
MCHC RBC AUTO-ENTMCNC: 32.3 G/DL (ref 30–36.5)
MCV RBC AUTO: 95.5 FL (ref 80–99)
NRBC # BLD: 0 K/UL (ref 0–0.01)
NRBC BLD-RTO: 0 PER 100 WBC
PLATELET # BLD AUTO: 232 K/UL (ref 150–400)
PMV BLD AUTO: 10.9 FL (ref 8.9–12.9)
POTASSIUM SERPL-SCNC: 3.5 MMOL/L (ref 3.5–5.1)
PROT SERPL-MCNC: 7.3 G/DL (ref 6.4–8.2)
RBC # BLD AUTO: 4.24 M/UL (ref 3.8–5.2)
SODIUM SERPL-SCNC: 142 MMOL/L (ref 136–145)
TRIGL SERPL-MCNC: 66 MG/DL
VLDLC SERPL CALC-MCNC: 13.2 MG/DL
WBC # BLD AUTO: 3.3 K/UL (ref 3.6–11)

## 2023-07-20 RX ORDER — PRAVASTATIN SODIUM 20 MG
20 TABLET ORAL DAILY
Qty: 30 TABLET | Refills: 5 | Status: SHIPPED | OUTPATIENT
Start: 2023-07-20

## 2023-07-20 RX ORDER — EZETIMIBE 10 MG/1
10 TABLET ORAL DAILY
Qty: 30 TABLET | Refills: 5 | Status: SHIPPED | OUTPATIENT
Start: 2023-07-20 | End: 2023-07-20 | Stop reason: ALTCHOICE

## 2023-08-25 RX ORDER — SERTRALINE HYDROCHLORIDE 25 MG/1
TABLET, FILM COATED ORAL
Qty: 90 TABLET | Refills: 1 | Status: SHIPPED | OUTPATIENT
Start: 2023-08-25

## 2023-08-25 NOTE — TELEPHONE ENCOUNTER
Last appointment: 7/10/23  Next appointment: 11/15/23  Previous refill encounter(s): 5/5/23 #30 with 3 refills    Requested Prescriptions     Pending Prescriptions Disp Refills    sertraline (ZOLOFT) 25 MG tablet [Pharmacy Med Name: SERTRALINE 25MG TABLETS] 90 tablet 1     Sig: TAKE 1 TABLET BY MOUTH DAILY FOR ANXIETY WITH DEPRESSION         For Pharmacy Admin Tracking Only    Program: Medication Refill  CPA in place:    Recommendation Provided To:    Intervention Detail: New Rx: 1, reason: Patient Preference  Intervention Accepted By:   Celena Knox Closed?:    Time Spent (min): 5

## 2023-08-30 NOTE — TELEPHONE ENCOUNTER
Last appointment: 7/10/23  Next appointment: 11/15/23  Previous refill encounter(s): 9/8/22 #180 with 3 refills    Requested Prescriptions     Pending Prescriptions Disp Refills    metFORMIN (GLUCOPHAGE) 500 MG tablet [Pharmacy Med Name: METFORMIN 500MG TABLETS] 180 tablet 3     Sig: TAKE 1 TABLET BY MOUTH TWICE DAILY WITH MEALS         For Pharmacy Admin Tracking Only    Program: Medication Refill  CPA in place:    Recommendation Provided To:    Intervention Detail: New Rx: 1, reason: Patient Preference  Intervention Accepted By:   Jahaira Jaramillo Closed?:    Time Spent (min): 5

## 2023-09-20 ENCOUNTER — HOSPITAL ENCOUNTER (EMERGENCY)
Facility: HOSPITAL | Age: 78
Discharge: HOME | End: 2023-09-20
Attending: EMERGENCY MEDICINE
Payer: MEDICARE

## 2023-09-20 VITALS
WEIGHT: 164 LBS | RESPIRATION RATE: 18 BRPM | TEMPERATURE: 98.4 F | HEIGHT: 62 IN | BODY MASS INDEX: 30.18 KG/M2 | SYSTOLIC BLOOD PRESSURE: 165 MMHG | HEART RATE: 63 BPM | OXYGEN SATURATION: 99 % | DIASTOLIC BLOOD PRESSURE: 61 MMHG

## 2023-09-20 DIAGNOSIS — N30.00 ACUTE CYSTITIS WITHOUT HEMATURIA: Primary | ICD-10-CM

## 2023-09-20 LAB
APPEARANCE UR: ABNORMAL
BACTERIA URNS QL MICRO: ABNORMAL /HPF
BILIRUB UR QL: NEGATIVE
COLOR UR: ABNORMAL
EPITH CASTS URNS QL MICRO: ABNORMAL /LPF
FLUAV RNA SPEC QL NAA+PROBE: NOT DETECTED
FLUBV RNA SPEC QL NAA+PROBE: NOT DETECTED
GLUCOSE UR STRIP.AUTO-MCNC: NEGATIVE MG/DL
HGB UR QL STRIP: ABNORMAL
KETONES UR QL STRIP.AUTO: NEGATIVE MG/DL
LEUKOCYTE ESTERASE UR QL STRIP.AUTO: ABNORMAL
NITRITE UR QL STRIP.AUTO: NEGATIVE
PH UR STRIP: 6.5 (ref 5–8)
PROT UR STRIP-MCNC: NEGATIVE MG/DL
RBC #/AREA URNS HPF: ABNORMAL /HPF (ref 0–5)
SARS-COV-2 RNA RESP QL NAA+PROBE: NOT DETECTED
SP GR UR REFRACTOMETRY: 1.02
URINE CULTURE IF INDICATED: ABNORMAL
UROBILINOGEN UR QL STRIP.AUTO: 1 EU/DL (ref 0.2–1)
WBC URNS QL MICRO: ABNORMAL /HPF (ref 0–4)

## 2023-09-20 PROCEDURE — 87077 CULTURE AEROBIC IDENTIFY: CPT

## 2023-09-20 PROCEDURE — 99283 EMERGENCY DEPT VISIT LOW MDM: CPT

## 2023-09-20 PROCEDURE — 81001 URINALYSIS AUTO W/SCOPE: CPT

## 2023-09-20 PROCEDURE — 87086 URINE CULTURE/COLONY COUNT: CPT

## 2023-09-20 PROCEDURE — 6370000000 HC RX 637 (ALT 250 FOR IP): Performed by: EMERGENCY MEDICINE

## 2023-09-20 PROCEDURE — 87636 SARSCOV2 & INF A&B AMP PRB: CPT

## 2023-09-20 RX ORDER — CEPHALEXIN 500 MG/1
500 CAPSULE ORAL EVERY 6 HOURS SCHEDULED
Status: DISCONTINUED | OUTPATIENT
Start: 2023-09-20 | End: 2023-09-20 | Stop reason: HOSPADM

## 2023-09-20 RX ORDER — CEPHALEXIN 250 MG/1
250 CAPSULE ORAL 4 TIMES DAILY
Qty: 40 CAPSULE | Refills: 0 | Status: SHIPPED | OUTPATIENT
Start: 2023-09-20 | End: 2023-09-20 | Stop reason: SDUPTHER

## 2023-09-20 RX ORDER — CEPHALEXIN 250 MG/1
500 CAPSULE ORAL 3 TIMES DAILY
Qty: 60 CAPSULE | Refills: 0 | Status: SHIPPED | OUTPATIENT
Start: 2023-09-20 | End: 2023-09-30

## 2023-09-20 RX ADMIN — CEPHALEXIN 500 MG: 500 CAPSULE ORAL at 20:08

## 2023-09-20 ASSESSMENT — PAIN - FUNCTIONAL ASSESSMENT: PAIN_FUNCTIONAL_ASSESSMENT: NONE - DENIES PAIN

## 2023-09-21 ENCOUNTER — FOLLOWUP TELEPHONE ENCOUNTER (OUTPATIENT)
Facility: HOSPITAL | Age: 78
End: 2023-09-21

## 2023-09-21 NOTE — TELEPHONE ENCOUNTER
CM called pt at [phone] to inquire whether she would like assistance scheduling an appointment with her primary care doctor (as a follow up to her visit to the ED yesterday). Pt's spouse, Victoriano Douglas, answered the phone. Mr. Anisa Shay told CM that pt is not home at the moment, but that he will pass CM's number along to her and request that she give CM a call back. CM will await contact from pt. CM reviewed pt's chart and observed that pt has an appt scheduled with her PCP on 11/15. CM to inquire whether pt is interested in scheduling a sooner available appt.     Rodo Kendrick, [address] ,   [phone]

## 2023-09-21 NOTE — ED NOTES
DISCHARGE INSTRUCTIONS  Pt discharged home. A total of 0 written and 1 electronic prescriptions were discussed with pt. Pt educated on follow up appointments, diagnosis print outs, and medication list.  Pt verbalized understanding. All questions answered. Pt stable to go home. Pt was offered wheelchair, pt refused wheelchair.        Harshal Hernandez RN  09/20/23 8516

## 2023-09-22 LAB
BACTERIA SPEC CULT: ABNORMAL
CC UR VC: ABNORMAL
SERVICE CMNT-IMP: ABNORMAL

## 2023-09-25 ENCOUNTER — TELEPHONE (OUTPATIENT)
Age: 78
End: 2023-09-25

## 2023-09-25 NOTE — TELEPHONE ENCOUNTER
Patient would like for Josh Arevalo to send her something for congestion and upper respiratory issues she can be reached @ [phone]

## 2023-09-26 NOTE — TELEPHONE ENCOUNTER
Patient would like for Evi Michele to send her something for congestion and upper respiratory issues she can be reached @ [phone]  ----------------------------------------------------------------------------------------------  Pt went to [facility] on 9/20/23 and was prescribed cephalexin and her COVID test was negative at the ER also. Today she states she feels better since she called the office on yesterday.

## 2023-10-03 ENCOUNTER — TELEPHONE (OUTPATIENT)
Age: 78
End: 2023-10-03

## 2023-10-03 ENCOUNTER — OFFICE VISIT (OUTPATIENT)
Age: 78
End: 2023-10-03
Payer: MEDICARE

## 2023-10-03 VITALS
BODY MASS INDEX: 30.25 KG/M2 | OXYGEN SATURATION: 100 % | RESPIRATION RATE: 20 BRPM | HEIGHT: 62 IN | SYSTOLIC BLOOD PRESSURE: 167 MMHG | HEART RATE: 61 BPM | WEIGHT: 164.4 LBS | TEMPERATURE: 98.6 F | DIASTOLIC BLOOD PRESSURE: 83 MMHG

## 2023-10-03 DIAGNOSIS — N30.01 ACUTE CYSTITIS WITH HEMATURIA: Primary | ICD-10-CM

## 2023-10-03 PROCEDURE — G8417 CALC BMI ABV UP PARAM F/U: HCPCS | Performed by: FAMILY MEDICINE

## 2023-10-03 PROCEDURE — 99212 OFFICE O/P EST SF 10 MIN: CPT | Performed by: FAMILY MEDICINE

## 2023-10-03 PROCEDURE — 3079F DIAST BP 80-89 MM HG: CPT | Performed by: FAMILY MEDICINE

## 2023-10-03 PROCEDURE — 3077F SYST BP >= 140 MM HG: CPT | Performed by: FAMILY MEDICINE

## 2023-10-03 PROCEDURE — G8427 DOCREV CUR MEDS BY ELIG CLIN: HCPCS | Performed by: FAMILY MEDICINE

## 2023-10-03 PROCEDURE — 1090F PRES/ABSN URINE INCON ASSESS: CPT | Performed by: FAMILY MEDICINE

## 2023-10-03 PROCEDURE — G8399 PT W/DXA RESULTS DOCUMENT: HCPCS | Performed by: FAMILY MEDICINE

## 2023-10-03 PROCEDURE — G8484 FLU IMMUNIZE NO ADMIN: HCPCS | Performed by: FAMILY MEDICINE

## 2023-10-03 PROCEDURE — 1123F ACP DISCUSS/DSCN MKR DOCD: CPT | Performed by: FAMILY MEDICINE

## 2023-10-03 PROCEDURE — 1036F TOBACCO NON-USER: CPT | Performed by: FAMILY MEDICINE

## 2023-10-03 RX ORDER — SULFAMETHOXAZOLE AND TRIMETHOPRIM 800; 160 MG/1; MG/1
1 TABLET ORAL 2 TIMES DAILY
Qty: 14 TABLET | Refills: 0 | Status: SHIPPED | OUTPATIENT
Start: 2023-10-03 | End: 2023-10-10

## 2023-10-03 RX ORDER — SULFAMETHOXAZOLE AND TRIMETHOPRIM 800; 160 MG/1; MG/1
1 TABLET ORAL 2 TIMES DAILY
Qty: 10 TABLET | Refills: 0 | Status: SHIPPED | OUTPATIENT
Start: 2023-10-03 | End: 2023-10-03 | Stop reason: DRUGHIGH

## 2023-10-03 SDOH — ECONOMIC STABILITY: FOOD INSECURITY: WITHIN THE PAST 12 MONTHS, THE FOOD YOU BOUGHT JUST DIDN'T LAST AND YOU DIDN'T HAVE MONEY TO GET MORE.: NEVER TRUE

## 2023-10-03 SDOH — ECONOMIC STABILITY: INCOME INSECURITY: HOW HARD IS IT FOR YOU TO PAY FOR THE VERY BASICS LIKE FOOD, HOUSING, MEDICAL CARE, AND HEATING?: NOT HARD AT ALL

## 2023-10-03 SDOH — ECONOMIC STABILITY: FOOD INSECURITY: WITHIN THE PAST 12 MONTHS, YOU WORRIED THAT YOUR FOOD WOULD RUN OUT BEFORE YOU GOT MONEY TO BUY MORE.: NEVER TRUE

## 2023-10-03 ASSESSMENT — PATIENT HEALTH QUESTIONNAIRE - PHQ9
1. LITTLE INTEREST OR PLEASURE IN DOING THINGS: 0
SUM OF ALL RESPONSES TO PHQ QUESTIONS 1-9: 0
SUM OF ALL RESPONSES TO PHQ QUESTIONS 1-9: 0
2. FEELING DOWN, DEPRESSED OR HOPELESS: 0
SUM OF ALL RESPONSES TO PHQ9 QUESTIONS 1 & 2: 0
SUM OF ALL RESPONSES TO PHQ QUESTIONS 1-9: 0
SUM OF ALL RESPONSES TO PHQ QUESTIONS 1-9: 0

## 2023-10-03 NOTE — PROGRESS NOTES
Chief Complaint   Patient presents with    Urinary Tract Infection       1. \"Have you been to the ER, urgent care clinic since your last visit? Hospitalized since your last visit? \" Yes - 09/21/2023     2. \"Have you seen or consulted any other health care providers outside of the [address] [place] [address] since your last visit? \" No     3. For patients aged 43-73: Has the patient had a colonoscopy / FIT/ Cologuard? Yes      If the patient is female:    4. For patients aged 43-66: Has the patient had a mammogram within the past 2 years? Yes      5. For patients aged 21-65: Has the patient had a pap smear?  No     Health Maintenance Due   Topic Date Due    Hepatitis B vaccine (1 of 3 - Risk 3-dose series) Never done    Flu vaccine (1) 08/01/2023
mouth 2 times daily for 5 days 10 tablet 0    metFORMIN (GLUCOPHAGE) 500 MG tablet TAKE 1 TABLET BY MOUTH TWICE DAILY WITH MEALS 180 tablet 3    albuterol sulfate HFA (PROVENTIL;VENTOLIN;PROAIR) 108 (90 Base) MCG/ACT inhaler Inhale 2 puffs into the lungs every 4 hours as needed      amLODIPine (NORVASC) 5 MG tablet TAKE 1 TABLET BY MOUTH DAILY      aspirin 81 MG EC tablet Take by mouth daily      Cholecalciferol 50 MCG (2000 UT) CAPS Take by mouth daily      fluticasone (FLONASE) 50 MCG/ACT nasal spray SHAKE LIQUID AND USE 2 SPRAYS IN EACH NOSTRIL DAILY AS NEEDED FOR RHINITIS      hydroCHLOROthiazide (HYDRODIURIL) 12.5 MG tablet Take by mouth daily      ibuprofen (ADVIL;MOTRIN) 800 MG tablet TAKE 1 TABLET BY MOUTH THREE TIMES DAILY AS NEEDED FOR PAIN      loratadine (CLARITIN) 10 MG tablet Take by mouth daily as needed      losartan (COZAAR) 100 MG tablet TAKE 1 TABLET BY MOUTH DAILY      promethazine-dextromethorphan (PROMETHAZINE-DM) 6.25-15 MG/5ML syrup Take by mouth every 6 hours as needed      sertraline (ZOLOFT) 25 MG tablet TAKE 1 TABLET BY MOUTH DAILY FOR ANXIETY WITH DEPRESSION (Patient not taking: Reported on 10/3/2023) 90 tablet 1    pravastatin (PRAVACHOL) 20 MG tablet Take 1 tablet by mouth daily Take for cholesterol (Patient not taking: Reported on 10/3/2023) 30 tablet 5     No current facility-administered medications for this visit.       Family History   Problem Relation Age of Onset    Anesth Problems Neg Hx     Thyroid Disease Sister     No Known Problems Father     No Known Problems Mother       Social History     Socioeconomic History    Marital status:      Spouse name: None    Number of children: None    Years of education: None    Highest education level: None   Tobacco Use    Smoking status: Never    Smokeless tobacco: Never   Vaping Use    Vaping Use: Never used   Substance and Sexual Activity    Alcohol use: Not Currently    Drug use: Not Currently     Types: Marijuana Dorcus Sjogren)

## 2023-10-03 NOTE — TELEPHONE ENCOUNTER
Patient states that it still burns when she urinates she thinks she still has a UTI can Ethan Chowdhury send her something in for it she can be reached @ [phone]  -----------------------------------------------------------------------------------------------    Pt states she has no other sx but if you want her to come in she can come and if not you can send her something to the pharmacy.

## 2023-10-03 NOTE — TELEPHONE ENCOUNTER
Patient states that it still burns when she urinates she thinks she still has a UTI can Nicole Burger send her something in for it she can be reached @ [phone]

## 2023-10-04 LAB
APPEARANCE UR: CLEAR
BACTERIA URNS QL MICRO: NEGATIVE /HPF
BILIRUB UR QL: NEGATIVE
COLOR UR: ABNORMAL
EPITH CASTS URNS QL MICRO: ABNORMAL /LPF
GLUCOSE UR STRIP.AUTO-MCNC: NEGATIVE MG/DL
HGB UR QL STRIP: NEGATIVE
KETONES UR QL STRIP.AUTO: NEGATIVE MG/DL
LEUKOCYTE ESTERASE UR QL STRIP.AUTO: ABNORMAL
NITRITE UR QL STRIP.AUTO: NEGATIVE
PH UR STRIP: 7.5 (ref 5–8)
PROT UR STRIP-MCNC: NEGATIVE MG/DL
RBC #/AREA URNS HPF: ABNORMAL /HPF (ref 0–5)
SP GR UR REFRACTOMETRY: 1 (ref 1–1.03)
UROBILINOGEN UR QL STRIP.AUTO: 0.2 EU/DL (ref 0.2–1)
WBC URNS QL MICRO: ABNORMAL /HPF (ref 0–4)

## 2023-10-05 LAB
BACTERIA SPEC CULT: NORMAL
CC UR VC: NORMAL
SERVICE CMNT-IMP: NORMAL

## 2023-10-25 ENCOUNTER — TELEPHONE (OUTPATIENT)
Age: 78
End: 2023-10-25

## 2023-10-25 NOTE — TELEPHONE ENCOUNTER
[phone] Patient would like a return call regarding a getting areferral to have a Bilateral Diagnostic Mammogram.

## 2023-10-26 NOTE — TELEPHONE ENCOUNTER
[phone] Patient would like a return call regarding a getting areferral to have a Bilateral Diagnostic Mammogram.  --------------------------------------------------------------------------------------------    Pt need an order not a referral.

## 2023-10-30 DIAGNOSIS — Z85.3 PERSONAL HISTORY OF MALIGNANT NEOPLASM OF BREAST: Primary | ICD-10-CM

## 2023-11-15 ENCOUNTER — OFFICE VISIT (OUTPATIENT)
Age: 78
End: 2023-11-15
Payer: MEDICARE

## 2023-11-15 VITALS
TEMPERATURE: 98.5 F | WEIGHT: 165.2 LBS | SYSTOLIC BLOOD PRESSURE: 146 MMHG | HEART RATE: 60 BPM | OXYGEN SATURATION: 100 % | DIASTOLIC BLOOD PRESSURE: 68 MMHG | HEIGHT: 62 IN | RESPIRATION RATE: 18 BRPM | BODY MASS INDEX: 30.4 KG/M2

## 2023-11-15 DIAGNOSIS — Z79.899 ENCOUNTER FOR LONG-TERM (CURRENT) USE OF MEDICATIONS: ICD-10-CM

## 2023-11-15 DIAGNOSIS — F43.23 ADJUSTMENT DISORDER WITH MIXED ANXIETY AND DEPRESSED MOOD: ICD-10-CM

## 2023-11-15 DIAGNOSIS — E78.2 MIXED HYPERLIPIDEMIA: ICD-10-CM

## 2023-11-15 DIAGNOSIS — I10 ESSENTIAL (PRIMARY) HYPERTENSION: ICD-10-CM

## 2023-11-15 DIAGNOSIS — M70.61 TROCHANTERIC BURSITIS OF RIGHT HIP: ICD-10-CM

## 2023-11-15 DIAGNOSIS — Z00.00 MEDICARE ANNUAL WELLNESS VISIT, SUBSEQUENT: Primary | ICD-10-CM

## 2023-11-15 DIAGNOSIS — E11.42 TYPE 2 DIABETES MELLITUS WITH DIABETIC POLYNEUROPATHY, WITHOUT LONG-TERM CURRENT USE OF INSULIN (HCC): ICD-10-CM

## 2023-11-15 LAB
ANION GAP SERPL CALC-SCNC: 6 MMOL/L (ref 5–15)
BUN SERPL-MCNC: 16 MG/DL (ref 6–20)
BUN/CREAT SERPL: 30 (ref 12–20)
CALCIUM SERPL-MCNC: 10 MG/DL (ref 8.5–10.1)
CHLORIDE SERPL-SCNC: 103 MMOL/L (ref 97–108)
CHOLEST SERPL-MCNC: 220 MG/DL
CO2 SERPL-SCNC: 29 MMOL/L (ref 21–32)
CREAT SERPL-MCNC: 0.53 MG/DL (ref 0.55–1.02)
CREAT UR-MCNC: 27.9 MG/DL
GLUCOSE SERPL-MCNC: 101 MG/DL (ref 65–100)
GLUCOSE, POC: 161 MG/DL
HBA1C MFR BLD: 5.9 %
HDLC SERPL-MCNC: 80 MG/DL
HDLC SERPL: 2.8 (ref 0–5)
LDLC SERPL CALC-MCNC: 123.4 MG/DL (ref 0–100)
MICROALBUMIN UR-MCNC: <0.5 MG/DL
MICROALBUMIN/CREAT UR-RTO: <18 MG/G (ref 0–30)
POTASSIUM SERPL-SCNC: 3.7 MMOL/L (ref 3.5–5.1)
SODIUM SERPL-SCNC: 138 MMOL/L (ref 136–145)
TRIGL SERPL-MCNC: 83 MG/DL
VLDLC SERPL CALC-MCNC: 16.6 MG/DL

## 2023-11-15 PROCEDURE — 1036F TOBACCO NON-USER: CPT | Performed by: FAMILY MEDICINE

## 2023-11-15 PROCEDURE — G8427 DOCREV CUR MEDS BY ELIG CLIN: HCPCS | Performed by: FAMILY MEDICINE

## 2023-11-15 PROCEDURE — G8484 FLU IMMUNIZE NO ADMIN: HCPCS | Performed by: FAMILY MEDICINE

## 2023-11-15 PROCEDURE — 83036 HEMOGLOBIN GLYCOSYLATED A1C: CPT | Performed by: FAMILY MEDICINE

## 2023-11-15 PROCEDURE — PBSHW AMB POC HEMOGLOBIN A1C: Performed by: FAMILY MEDICINE

## 2023-11-15 PROCEDURE — G8399 PT W/DXA RESULTS DOCUMENT: HCPCS | Performed by: FAMILY MEDICINE

## 2023-11-15 PROCEDURE — 99214 OFFICE O/P EST MOD 30 MIN: CPT | Performed by: FAMILY MEDICINE

## 2023-11-15 PROCEDURE — PBSHW INFLUENZA, FLUAD, (AGE 65 Y+), IM, PF, 0.5 ML: Performed by: FAMILY MEDICINE

## 2023-11-15 PROCEDURE — 1090F PRES/ABSN URINE INCON ASSESS: CPT | Performed by: FAMILY MEDICINE

## 2023-11-15 PROCEDURE — 3078F DIAST BP <80 MM HG: CPT | Performed by: FAMILY MEDICINE

## 2023-11-15 PROCEDURE — 1123F ACP DISCUSS/DSCN MKR DOCD: CPT | Performed by: FAMILY MEDICINE

## 2023-11-15 PROCEDURE — 3077F SYST BP >= 140 MM HG: CPT | Performed by: FAMILY MEDICINE

## 2023-11-15 PROCEDURE — PBSHW AMB POC GLUCOSE BLOOD, BY GLUCOSE MONITORING DEVICE: Performed by: FAMILY MEDICINE

## 2023-11-15 PROCEDURE — 82962 GLUCOSE BLOOD TEST: CPT | Performed by: FAMILY MEDICINE

## 2023-11-15 PROCEDURE — 90694 VACC AIIV4 NO PRSRV 0.5ML IM: CPT | Performed by: FAMILY MEDICINE

## 2023-11-15 PROCEDURE — G8417 CALC BMI ABV UP PARAM F/U: HCPCS | Performed by: FAMILY MEDICINE

## 2023-11-15 PROCEDURE — G0439 PPPS, SUBSEQ VISIT: HCPCS | Performed by: FAMILY MEDICINE

## 2023-11-15 RX ORDER — AMLODIPINE BESYLATE 5 MG/1
10 TABLET ORAL DAILY
Qty: 60 TABLET | Refills: 5 | Status: SHIPPED | OUTPATIENT
Start: 2023-11-15

## 2023-11-15 RX ORDER — PREDNISONE 10 MG/1
10 TABLET ORAL 2 TIMES DAILY
Qty: 10 TABLET | Refills: 0 | Status: SHIPPED | OUTPATIENT
Start: 2023-11-15 | End: 2023-11-20

## 2023-11-15 SDOH — ECONOMIC STABILITY: INCOME INSECURITY: HOW HARD IS IT FOR YOU TO PAY FOR THE VERY BASICS LIKE FOOD, HOUSING, MEDICAL CARE, AND HEATING?: NOT HARD AT ALL

## 2023-11-15 SDOH — ECONOMIC STABILITY: FOOD INSECURITY: WITHIN THE PAST 12 MONTHS, THE FOOD YOU BOUGHT JUST DIDN'T LAST AND YOU DIDN'T HAVE MONEY TO GET MORE.: NEVER TRUE

## 2023-11-15 SDOH — ECONOMIC STABILITY: FOOD INSECURITY: WITHIN THE PAST 12 MONTHS, YOU WORRIED THAT YOUR FOOD WOULD RUN OUT BEFORE YOU GOT MONEY TO BUY MORE.: NEVER TRUE

## 2023-11-15 ASSESSMENT — ENCOUNTER SYMPTOMS
CHEST TIGHTNESS: 0
ABDOMINAL PAIN: 0
SHORTNESS OF BREATH: 0
COUGH: 0
RHINORRHEA: 0
BLOOD IN STOOL: 0
CONSTIPATION: 0
BACK PAIN: 0

## 2023-11-15 ASSESSMENT — PATIENT HEALTH QUESTIONNAIRE - PHQ9
2. FEELING DOWN, DEPRESSED OR HOPELESS: 0
SUM OF ALL RESPONSES TO PHQ QUESTIONS 1-9: 0
1. LITTLE INTEREST OR PLEASURE IN DOING THINGS: 0
SUM OF ALL RESPONSES TO PHQ QUESTIONS 1-9: 0
SUM OF ALL RESPONSES TO PHQ9 QUESTIONS 1 & 2: 0

## 2023-11-15 ASSESSMENT — LIFESTYLE VARIABLES
HOW MANY STANDARD DRINKS CONTAINING ALCOHOL DO YOU HAVE ON A TYPICAL DAY: PATIENT DOES NOT DRINK
HOW OFTEN DO YOU HAVE A DRINK CONTAINING ALCOHOL: NEVER

## 2023-11-15 NOTE — PROGRESS NOTES
Subjective:      Patient ID: Nj Monique is a 69 y.o. female. HPI  Follow up on chronic medical problems. Increased stress at home with caring for her sister-in law who has multiple medical problems and on dialysis. She is now in a long term care facility. Her  has been diagnosed with dementia. She had been feeling more anxious and stress but overall is handling it much better. Decided not to take the Zoloft. She is going to a Alzheimer's support group which has been helpful. Has not been feeling depressed as before. Overall sleeping much better and taking the time out for herself. Has good family support. She does not think that she needs to see a therapist at this point. No SI/HI. C/o right hip pain for the past 2 weeks. Pain comes and goes but worse with walking. Thinks she may have pulled on that side lifting a crate of water. Has no swelling. Stiffness after sitting. Has not taken anything for the pain. Pain is 5-6/10. Has no popping or giving away of the hip. Pain radiates to the groin and upper thigh. HTN follow up:   Compliant w/ meds, low salt diet, and exercise. No swelling, headache or dizziness. No chest pain, SOB, palpitations. Otherwise feeling well since the last visit. DM type II follow up:   Compliant w/ meds, diabetic diet, and exercise. Obtains home glucose monitoring averaging 100-140s. No hypoglycemia. Checks BS BID on most days and prn. Pt does not have BS log at visit today. No Rf needed for today. Tingling sensation in the foot and toes which comes and goes. Denies polyuria and polydipsia. No blurred vision. No significant weight changes. Hypercholesterolemia follow up:   Compliant w/ meds, low fat, low cholesterol diet. Stopped crestor d/t muscle aching. We discussed her LDL cholesterol and decided to try another statin. Exercising some. No abdominal pain, no skin  discoloration. Patient fasting today.    HM:    Mammogram

## 2023-11-15 NOTE — PROGRESS NOTES
Chief Complaint   Patient presents with    Medicare AWV       1. \"Have you been to the ER, urgent care clinic since your last visit? Hospitalized since your last visit? \" No    2. \"Have you seen or consulted any other health care providers outside of the [address] [place] [address] since your last visit? \" No     3. For patients aged 43-73: Has the patient had a colonoscopy / FIT/ Cologuard? Yes      If the patient is female:    4. For patients aged 43-66: Has the patient had a mammogram within the past 2 years? Yes      5. For patients aged 21-65: Has the patient had a pap smear?  N/A     Health Maintenance Due   Topic Date Due    Flu vaccine (1) 08/01/2023    COVID-19 Vaccine (7 - 2023-24 season) 09/01/2023    A1C test (Diabetic or Prediabetic)  11/05/2023    Annual Wellness Visit (AWV)  11/05/2023    Diabetic retinal exam  12/05/2023

## 2023-11-20 ENCOUNTER — HOSPITAL ENCOUNTER (OUTPATIENT)
Facility: HOSPITAL | Age: 78
Discharge: HOME | End: 2023-11-23
Payer: MEDICARE

## 2023-11-20 VITALS — WEIGHT: 165 LBS | HEIGHT: 63 IN | BODY MASS INDEX: 29.23 KG/M2

## 2023-11-20 DIAGNOSIS — Z85.3 PERSONAL HISTORY OF MALIGNANT NEOPLASM OF BREAST: ICD-10-CM

## 2023-11-20 PROCEDURE — G0279 TOMOSYNTHESIS, MAMMO: HCPCS

## 2024-01-03 ENCOUNTER — OFFICE VISIT (OUTPATIENT)
Age: 79
End: 2024-01-03
Payer: MEDICARE

## 2024-01-03 VITALS
HEIGHT: 63 IN | RESPIRATION RATE: 18 BRPM | WEIGHT: 164.8 LBS | HEART RATE: 75 BPM | OXYGEN SATURATION: 100 % | SYSTOLIC BLOOD PRESSURE: 126 MMHG | DIASTOLIC BLOOD PRESSURE: 71 MMHG | BODY MASS INDEX: 29.2 KG/M2 | TEMPERATURE: 98.6 F

## 2024-01-03 DIAGNOSIS — I10 ESSENTIAL (PRIMARY) HYPERTENSION: Primary | ICD-10-CM

## 2024-01-03 DIAGNOSIS — Z79.899 ENCOUNTER FOR LONG-TERM (CURRENT) USE OF MEDICATIONS: ICD-10-CM

## 2024-01-03 PROCEDURE — G8417 CALC BMI ABV UP PARAM F/U: HCPCS | Performed by: FAMILY MEDICINE

## 2024-01-03 PROCEDURE — 1090F PRES/ABSN URINE INCON ASSESS: CPT | Performed by: FAMILY MEDICINE

## 2024-01-03 PROCEDURE — G8399 PT W/DXA RESULTS DOCUMENT: HCPCS | Performed by: FAMILY MEDICINE

## 2024-01-03 PROCEDURE — 99212 OFFICE O/P EST SF 10 MIN: CPT | Performed by: FAMILY MEDICINE

## 2024-01-03 PROCEDURE — 3078F DIAST BP <80 MM HG: CPT | Performed by: FAMILY MEDICINE

## 2024-01-03 PROCEDURE — 1036F TOBACCO NON-USER: CPT | Performed by: FAMILY MEDICINE

## 2024-01-03 PROCEDURE — G8484 FLU IMMUNIZE NO ADMIN: HCPCS | Performed by: FAMILY MEDICINE

## 2024-01-03 PROCEDURE — 1123F ACP DISCUSS/DSCN MKR DOCD: CPT | Performed by: FAMILY MEDICINE

## 2024-01-03 PROCEDURE — 3074F SYST BP LT 130 MM HG: CPT | Performed by: FAMILY MEDICINE

## 2024-01-03 PROCEDURE — G8427 DOCREV CUR MEDS BY ELIG CLIN: HCPCS | Performed by: FAMILY MEDICINE

## 2024-01-03 SDOH — ECONOMIC STABILITY: FOOD INSECURITY: WITHIN THE PAST 12 MONTHS, THE FOOD YOU BOUGHT JUST DIDN'T LAST AND YOU DIDN'T HAVE MONEY TO GET MORE.: NEVER TRUE

## 2024-01-03 SDOH — ECONOMIC STABILITY: INCOME INSECURITY: HOW HARD IS IT FOR YOU TO PAY FOR THE VERY BASICS LIKE FOOD, HOUSING, MEDICAL CARE, AND HEATING?: NOT HARD AT ALL

## 2024-01-03 SDOH — ECONOMIC STABILITY: FOOD INSECURITY: WITHIN THE PAST 12 MONTHS, YOU WORRIED THAT YOUR FOOD WOULD RUN OUT BEFORE YOU GOT MONEY TO BUY MORE.: NEVER TRUE

## 2024-01-03 ASSESSMENT — PATIENT HEALTH QUESTIONNAIRE - PHQ9
SUM OF ALL RESPONSES TO PHQ QUESTIONS 1-9: 1
SUM OF ALL RESPONSES TO PHQ9 QUESTIONS 1 & 2: 1
SUM OF ALL RESPONSES TO PHQ QUESTIONS 1-9: 1
SUM OF ALL RESPONSES TO PHQ QUESTIONS 1-9: 1
1. LITTLE INTEREST OR PLEASURE IN DOING THINGS: 0
SUM OF ALL RESPONSES TO PHQ QUESTIONS 1-9: 1
2. FEELING DOWN, DEPRESSED OR HOPELESS: 1

## 2024-01-03 ASSESSMENT — ENCOUNTER SYMPTOMS
CHEST TIGHTNESS: 0
ABDOMINAL PAIN: 0
SHORTNESS OF BREATH: 0
CONSTIPATION: 0
BLOOD IN STOOL: 0
RHINORRHEA: 0
BACK PAIN: 0
COUGH: 0

## 2024-01-03 NOTE — PROGRESS NOTES
Chief Complaint   Patient presents with    Follow-up     7 week       \"Have you been to the ER, urgent care clinic since your last visit?  Hospitalized since your last visit?\"    NO    “Have you seen or consulted any other health care providers outside of [facility] since your last visit?”    NO             Vitals:    24 1205   BP: 126/71   Pulse: 75   Resp: 18   Temp: 98.6 °F (37 °C)   SpO2: 100%       Health Maintenance Due   Topic Date Due    Respiratory Syncytial Virus (RSV) Pregnant or age 60 yrs+ (1 - 1-dose 60+ series) Never done    Annual Wellness Visit (Medicare Advantage)  2024    Diabetic retinal exam  2023        The patient, Gem Nicole, identity was verified by name and .   
shared decision making we have agreed to the above plan. The patient has received an after-visit summary and questions were answered concerning future plans and follow up.  Advise pt of any urgent changes then to proceed to the ER.            Pema Angel MD

## 2024-01-26 RX ORDER — PRAVASTATIN SODIUM 20 MG
TABLET ORAL
Qty: 30 TABLET | Refills: 5 | Status: SHIPPED | OUTPATIENT
Start: 2024-01-26

## 2024-02-19 RX ORDER — SERTRALINE HYDROCHLORIDE 25 MG/1
TABLET, FILM COATED ORAL
Qty: 90 TABLET | Refills: 1 | Status: SHIPPED | OUTPATIENT
Start: 2024-02-19

## 2024-02-19 NOTE — TELEPHONE ENCOUNTER
I show this was d/c on 10/3/23 citing \"therapy complete\". Please sign if appropriate.    Last appointment: 1/3/24  Next appointment: 5/8/24  Previous refill encounter(s): 8/25/23 #90 with 1 refill    Requested Prescriptions     Pending Prescriptions Disp Refills    sertraline (ZOLOFT) 25 MG tablet [Pharmacy Med Name: SERTRALINE 25MG TABLETS] 90 tablet 1     Sig: TAKE 1 TABLET BY MOUTH DAILY FOR ANXIETY WITH DEPRESSION         For Pharmacy Admin Tracking Only    Program: Medication Refill  CPA in place:    Recommendation Provided To:   Intervention Detail: New Rx: 1, reason: Patient Preference  Intervention Accepted By:   Gap Closed?:    Time Spent (min): 5

## 2024-03-06 RX ORDER — LOSARTAN POTASSIUM 100 MG/1
TABLET ORAL
Qty: 90 TABLET | Refills: 3 | Status: SHIPPED | OUTPATIENT
Start: 2024-03-06

## 2024-03-06 NOTE — TELEPHONE ENCOUNTER
Last appointment: 1/3/24  Next appointment: 5/8/24  Previous refill encounter(s): 3/17/23    Requested Prescriptions     Pending Prescriptions Disp Refills    losartan (COZAAR) 100 MG tablet [Pharmacy Med Name: LOSARTAN 100MG TABLETS] 90 tablet 3     Sig: TAKE 1 TABLET BY MOUTH DAILY         For Pharmacy Admin Tracking Only    Program: Medication Refill  CPA in place:    Recommendation Provided To:   Intervention Detail: New Rx: 1, reason: Patient Preference  Intervention Accepted By:   Gap Closed?:    Time Spent (min): 5

## 2024-04-09 ENCOUNTER — HOSPITAL ENCOUNTER (EMERGENCY)
Facility: HOSPITAL | Age: 79
Discharge: HOME | End: 2024-04-09
Attending: EMERGENCY MEDICINE
Payer: MEDICARE

## 2024-04-09 VITALS
HEIGHT: 63 IN | BODY MASS INDEX: 29.23 KG/M2 | RESPIRATION RATE: 16 BRPM | OXYGEN SATURATION: 99 % | DIASTOLIC BLOOD PRESSURE: 82 MMHG | HEART RATE: 64 BPM | SYSTOLIC BLOOD PRESSURE: 157 MMHG | TEMPERATURE: 98.3 F | WEIGHT: 165 LBS

## 2024-04-09 DIAGNOSIS — M25.511 ACUTE PAIN OF RIGHT SHOULDER: ICD-10-CM

## 2024-04-09 DIAGNOSIS — K02.9 DENTAL CARIES: ICD-10-CM

## 2024-04-09 DIAGNOSIS — J30.2 SEASONAL ALLERGIC RHINITIS, UNSPECIFIED TRIGGER: Primary | ICD-10-CM

## 2024-04-09 PROCEDURE — 99283 EMERGENCY DEPT VISIT LOW MDM: CPT

## 2024-04-09 RX ORDER — AMOXICILLIN AND CLAVULANATE POTASSIUM 875; 125 MG/1; MG/1
1 TABLET, FILM COATED ORAL 2 TIMES DAILY
Qty: 14 TABLET | Refills: 0 | Status: SHIPPED | OUTPATIENT
Start: 2024-04-09 | End: 2024-04-16

## 2024-04-09 RX ORDER — METHOCARBAMOL 750 MG/1
750 TABLET, FILM COATED ORAL 3 TIMES DAILY
Qty: 15 TABLET | Refills: 0 | Status: SHIPPED | OUTPATIENT
Start: 2024-04-09 | End: 2024-04-14

## 2024-04-09 ASSESSMENT — PAIN DESCRIPTION - ORIENTATION: ORIENTATION: RIGHT

## 2024-04-09 ASSESSMENT — LIFESTYLE VARIABLES
HOW OFTEN DO YOU HAVE A DRINK CONTAINING ALCOHOL: NEVER
HOW MANY STANDARD DRINKS CONTAINING ALCOHOL DO YOU HAVE ON A TYPICAL DAY: PATIENT DOES NOT DRINK

## 2024-04-09 ASSESSMENT — PAIN - FUNCTIONAL ASSESSMENT
PAIN_FUNCTIONAL_ASSESSMENT: ACTIVITIES ARE NOT PREVENTED
PAIN_FUNCTIONAL_ASSESSMENT: 0-10

## 2024-04-09 ASSESSMENT — PAIN DESCRIPTION - DESCRIPTORS: DESCRIPTORS: ACHING;PRESSURE

## 2024-04-09 ASSESSMENT — PAIN DESCRIPTION - LOCATION: LOCATION: SHOULDER;FACE

## 2024-04-09 ASSESSMENT — PAIN SCALES - GENERAL: PAINLEVEL_OUTOF10: 8

## 2024-04-09 NOTE — ED TRIAGE NOTES
Pt comes from home for c/o right facial pain and congestion radiating down to her right shoulder x 4 days. 8/10 hx of DM 2 and HTN.

## 2024-04-09 NOTE — ED PROVIDER NOTES
errors. Please excuse any errors that have escaped final proofreading.)    I am the Primary Clinician of Record.   Scottie Lam MD  (Electronically signed)           Scottie Lam MD  04/09/24 0909

## 2024-04-10 RX ORDER — HYDROCHLOROTHIAZIDE 12.5 MG/1
25 TABLET ORAL DAILY
Qty: 180 TABLET | Refills: 3 | Status: SHIPPED | OUTPATIENT
Start: 2024-04-10

## 2024-04-10 NOTE — TELEPHONE ENCOUNTER
Last appointment: 1/30/24  Next appointment: 5/8/24  Previous refill encounter(s): 4/20/23    Requested Prescriptions     Pending Prescriptions Disp Refills    hydroCHLOROthiazide 12.5 MG tablet [Pharmacy Med Name: HYDROCHLOROTHIAZIDE 12.5MG TABLETS] 180 tablet 3     Sig: TAKE 2 TABLETS BY MOUTH DAILY         For Pharmacy Admin Tracking Only    Program: Medication Refill  CPA in place:    Recommendation Provided To:   Intervention Detail: New Rx: 1, reason: Patient Preference  Intervention Accepted By:   Gap Closed?:    Time Spent (min): 5

## 2024-05-08 ENCOUNTER — TELEPHONE (OUTPATIENT)
Age: 79
End: 2024-05-08

## 2024-05-08 ENCOUNTER — OFFICE VISIT (OUTPATIENT)
Age: 79
End: 2024-05-08
Payer: MEDICARE

## 2024-05-08 VITALS
HEART RATE: 62 BPM | TEMPERATURE: 98.6 F | BODY MASS INDEX: 29.45 KG/M2 | DIASTOLIC BLOOD PRESSURE: 70 MMHG | OXYGEN SATURATION: 98 % | RESPIRATION RATE: 16 BRPM | HEIGHT: 63 IN | SYSTOLIC BLOOD PRESSURE: 134 MMHG | WEIGHT: 166.2 LBS

## 2024-05-08 DIAGNOSIS — E78.2 MIXED HYPERLIPIDEMIA: ICD-10-CM

## 2024-05-08 DIAGNOSIS — E11.42 TYPE 2 DIABETES MELLITUS WITH DIABETIC POLYNEUROPATHY, WITHOUT LONG-TERM CURRENT USE OF INSULIN (HCC): ICD-10-CM

## 2024-05-08 DIAGNOSIS — Z79.899 ENCOUNTER FOR LONG-TERM (CURRENT) USE OF MEDICATIONS: ICD-10-CM

## 2024-05-08 DIAGNOSIS — I10 ESSENTIAL (PRIMARY) HYPERTENSION: Primary | ICD-10-CM

## 2024-05-08 LAB
GLUCOSE, POC: 186 MG/DL
HBA1C MFR BLD: 6.1 %

## 2024-05-08 PROCEDURE — G8427 DOCREV CUR MEDS BY ELIG CLIN: HCPCS | Performed by: FAMILY MEDICINE

## 2024-05-08 PROCEDURE — 1123F ACP DISCUSS/DSCN MKR DOCD: CPT | Performed by: FAMILY MEDICINE

## 2024-05-08 PROCEDURE — G8417 CALC BMI ABV UP PARAM F/U: HCPCS | Performed by: FAMILY MEDICINE

## 2024-05-08 PROCEDURE — 99214 OFFICE O/P EST MOD 30 MIN: CPT | Performed by: FAMILY MEDICINE

## 2024-05-08 PROCEDURE — 3078F DIAST BP <80 MM HG: CPT | Performed by: FAMILY MEDICINE

## 2024-05-08 PROCEDURE — 1090F PRES/ABSN URINE INCON ASSESS: CPT | Performed by: FAMILY MEDICINE

## 2024-05-08 PROCEDURE — PBSHW AMB POC HEMOGLOBIN A1C: Performed by: FAMILY MEDICINE

## 2024-05-08 PROCEDURE — 1036F TOBACCO NON-USER: CPT | Performed by: FAMILY MEDICINE

## 2024-05-08 PROCEDURE — PBSHW AMB POC GLUCOSE BLOOD, BY GLUCOSE MONITORING DEVICE: Performed by: FAMILY MEDICINE

## 2024-05-08 PROCEDURE — G8399 PT W/DXA RESULTS DOCUMENT: HCPCS | Performed by: FAMILY MEDICINE

## 2024-05-08 PROCEDURE — 83036 HEMOGLOBIN GLYCOSYLATED A1C: CPT | Performed by: FAMILY MEDICINE

## 2024-05-08 PROCEDURE — 82962 GLUCOSE BLOOD TEST: CPT | Performed by: FAMILY MEDICINE

## 2024-05-08 PROCEDURE — 3075F SYST BP GE 130 - 139MM HG: CPT | Performed by: FAMILY MEDICINE

## 2024-05-08 ASSESSMENT — ENCOUNTER SYMPTOMS
SHORTNESS OF BREATH: 0
ABDOMINAL PAIN: 0
BACK PAIN: 0
RHINORRHEA: 0
CONSTIPATION: 0
COUGH: 0
BLOOD IN STOOL: 0
CHEST TIGHTNESS: 0

## 2024-05-08 ASSESSMENT — PATIENT HEALTH QUESTIONNAIRE - PHQ9
2. FEELING DOWN, DEPRESSED OR HOPELESS: SEVERAL DAYS
SUM OF ALL RESPONSES TO PHQ QUESTIONS 1-9: 1
1. LITTLE INTEREST OR PLEASURE IN DOING THINGS: NOT AT ALL
SUM OF ALL RESPONSES TO PHQ9 QUESTIONS 1 & 2: 1
SUM OF ALL RESPONSES TO PHQ QUESTIONS 1-9: 1

## 2024-05-08 NOTE — PROGRESS NOTES
Chief Complaint   Patient presents with    Follow-up     Patient is here today for 4 month follow-up.       \"Have you been to the ER, urgent care clinic since your last visit?  Hospitalized since your last visit?\"    Yes, [facility] for sinuses.    “Have you seen or consulted any other health care providers outside of [facility] since your last visit?”    NO             Vitals:    24 0936   BP: (!) 149/67   Pulse:    Resp:    Temp:    SpO2:        Health Maintenance Due   Topic Date Due    Diabetic retinal exam  2023    Annual Wellness Visit (Medicare Advantage)  2024    A1C test (Diabetic or Prediabetic)  05/15/2024        The patient, Gem Nicole, identity was verified by name and .

## 2024-05-08 NOTE — PROGRESS NOTES
Subjective:      Patient ID: Gem Nicole is a 79 y.o. female.    HPI  Follow up on chronic medical problems.  Increased stress at home.  Her  has been diagnosed with dementia.  She had been feeling more anxious and stress but overall is handling it much better.  Decided not to take the Zoloft.  She is going to a Alzheimer's support group which has been helpful.    HTN follow up:  Compliant w/ meds, low salt diet, and exercise.    No swelling, headache or dizziness.  No chest pain, SOB, palpitations.  Otherwise feeling well since the last visit.  DM type II follow up:  Compliant w/ meds, diabetic diet, and exercise.  Obtains home glucose monitoring averaging 100-140s.  No hypoglycemia.  Checks BS BID on most days and prn.  Pt does not have BS log at visit today.   No Rf needed for today.    Tingling sensation in the foot and toes which comes and goes.  Denies polyuria and polydipsia.  No blurred vision.  No significant weight changes.     Hypercholesterolemia follow up:   Compliant w/ meds, low fat, low cholesterol diet.  Stopped crestor and pravastatin d/t muscle aching.  Exercising some.  Patient fasting today.   HM:    Mammogram 11/20/23    Colonoscopy 3/4/2016 by Dr. Joyner- repeat in 10 years.    Past Medical History:   Diagnosis Date    Arthritis     knee    Cancer (HCC) 2003    melanoma -right lower extremity    Chronic pain     left knee & left shoulder    Diabetes (HCC)     type 2    Hypertension     Nausea & vomiting     Screen for colon cancer 10/2/2014     Past Surgical History:   Procedure Laterality Date    BREAST BIOPSY Right     yrs ago    BREAST BIOPSY Left 06/2021    BREAST LUMPECTOMY Left 8/18/2021    LEFT BREAST LUMPECTOMY WITH NEEDLE LOCALIZATION (1100) performed by Aly Katz Jr., MD at [facility] OR    CATARACT REMOVAL  2010    bilateral eyes    CHOLECYSTECTOMY      COLONOSCOPY FLX DX W/COLLJ SPEC WHEN PFRMD  2007    date and md unknown    HYSTERECTOMY (CERVIX STATUS

## 2024-05-08 NOTE — TELEPHONE ENCOUNTER
Called and left vm at Dr. Mario Malcolm office to fax over the pt most recent office notes and left fax number.

## 2024-05-08 NOTE — TELEPHONE ENCOUNTER
----- Message from Pema Angel MD sent at 5/8/2024 10:09 AM EDT -----  Please send for eye exam from Dr. Malcolm.       No

## 2024-05-09 DIAGNOSIS — I10 ESSENTIAL (PRIMARY) HYPERTENSION: ICD-10-CM

## 2024-05-09 LAB
ALBUMIN SERPL-MCNC: 4 G/DL (ref 3.5–5)
ALBUMIN/GLOB SERPL: 1.1 (ref 1.1–2.2)
ALP SERPL-CCNC: 66 U/L (ref 45–117)
ALT SERPL-CCNC: 13 U/L (ref 12–78)
ANION GAP SERPL CALC-SCNC: 5 MMOL/L (ref 5–15)
AST SERPL-CCNC: 12 U/L (ref 15–37)
BILIRUB SERPL-MCNC: 0.6 MG/DL (ref 0.2–1)
BUN SERPL-MCNC: 14 MG/DL (ref 6–20)
BUN/CREAT SERPL: 23 (ref 12–20)
CALCIUM SERPL-MCNC: 10.1 MG/DL (ref 8.5–10.1)
CHLORIDE SERPL-SCNC: 100 MMOL/L (ref 97–108)
CHOLEST SERPL-MCNC: 226 MG/DL
CO2 SERPL-SCNC: 30 MMOL/L (ref 21–32)
CREAT SERPL-MCNC: 0.61 MG/DL (ref 0.55–1.02)
ERYTHROCYTE [DISTWIDTH] IN BLOOD BY AUTOMATED COUNT: 12.2 % (ref 11.5–14.5)
GLOBULIN SER CALC-MCNC: 3.5 G/DL (ref 2–4)
GLUCOSE SERPL-MCNC: 128 MG/DL (ref 65–100)
HCT VFR BLD AUTO: 37.6 % (ref 35–47)
HDLC SERPL-MCNC: 85 MG/DL
HDLC SERPL: 2.7 (ref 0–5)
HGB BLD-MCNC: 12.9 G/DL (ref 11.5–16)
LDLC SERPL CALC-MCNC: 126.8 MG/DL (ref 0–100)
MCH RBC QN AUTO: 31.5 PG (ref 26–34)
MCHC RBC AUTO-ENTMCNC: 34.3 G/DL (ref 30–36.5)
MCV RBC AUTO: 91.9 FL (ref 80–99)
NRBC # BLD: 0 K/UL (ref 0–0.01)
NRBC BLD-RTO: 0 PER 100 WBC
PLATELET # BLD AUTO: 240 K/UL (ref 150–400)
PMV BLD AUTO: 11 FL (ref 8.9–12.9)
POTASSIUM SERPL-SCNC: 3.4 MMOL/L (ref 3.5–5.1)
PROT SERPL-MCNC: 7.5 G/DL (ref 6.4–8.2)
RBC # BLD AUTO: 4.09 M/UL (ref 3.8–5.2)
SODIUM SERPL-SCNC: 135 MMOL/L (ref 136–145)
TRIGL SERPL-MCNC: 71 MG/DL
VLDLC SERPL CALC-MCNC: 14.2 MG/DL
WBC # BLD AUTO: 3.3 K/UL (ref 3.6–11)

## 2024-05-09 RX ORDER — AMLODIPINE BESYLATE 5 MG/1
10 TABLET ORAL DAILY
Qty: 180 TABLET | Refills: 3 | Status: SHIPPED | OUTPATIENT
Start: 2024-05-09

## 2024-05-09 NOTE — TELEPHONE ENCOUNTER
Last appointment: 5/8/24  Next appointment: 11/8/24  Previous refill encounter(s): 11/15/23 #60 with 5 refills    Requested Prescriptions     Pending Prescriptions Disp Refills    amLODIPine (NORVASC) 5 MG tablet [Pharmacy Med Name: AMLODIPINE BESYLATE 5MG TABLETS] 180 tablet 3     Sig: TAKE 2 TABLETS BY MOUTH DAILY         For Pharmacy Admin Tracking Only    Program: Medication Refill  CPA in place:    Recommendation Provided To:   Intervention Detail: New Rx: 1, reason: Patient Preference  Intervention Accepted By:   Gap Closed?:    Time Spent (min): 5

## 2024-07-22 RX ORDER — PRAVASTATIN SODIUM 20 MG
TABLET ORAL
Qty: 90 TABLET | Refills: 3 | Status: SHIPPED | OUTPATIENT
Start: 2024-07-22

## 2024-07-22 NOTE — TELEPHONE ENCOUNTER
I show this was d/c on 5/8/24. Please sign if appropriate.    Last appointment: 5/8/24  Next appointment: 11/8/24  Previous refill encounter(s): 1/26/24 #30 with 5 refills    Requested Prescriptions     Pending Prescriptions Disp Refills    pravastatin (PRAVACHOL) 20 MG tablet [Pharmacy Med Name: PRAVASTATIN 20MG TABLETS] 90 tablet 3     Sig: TAKE 1 TABLET BY MOUTH EVERY DAY FOR CHOLESTEROL         For Pharmacy Admin Tracking Only    Program: Medication Refill  CPA in place:    Recommendation Provided To:   Intervention Detail: New Rx: 1, reason: Patient Preference  Intervention Accepted By:   Gap Closed?:    Time Spent (min): 5

## 2024-08-12 RX ORDER — HYDROCHLOROTHIAZIDE 12.5 MG/1
25 TABLET ORAL DAILY
Qty: 180 TABLET | Refills: 3 | Status: SHIPPED | OUTPATIENT
Start: 2024-08-12

## 2024-08-12 NOTE — TELEPHONE ENCOUNTER
Patient requesting refill on HCTZ 12. 5 mg sent to [facility]'s [phone]. Patient can be reached at [phone].

## 2024-08-16 NOTE — TELEPHONE ENCOUNTER
I show this was d/c on 5/8/24. Please sign if appropriate.    Last appointment: 5/8/24  Next appointment: 11/8/24  Previous refill encounter(s): 2/19/24 #90 with 1 refill    Requested Prescriptions     Pending Prescriptions Disp Refills    sertraline (ZOLOFT) 25 MG tablet [Pharmacy Med Name: SERTRALINE 25MG TABLETS] 90 tablet 1     Sig: TAKE 1 TABLET BY MOUTH DAILY FOR ANXIETY WITH DEPRESSION         For Pharmacy Admin Tracking Only    Program: Medication Refill  CPA in place:    Recommendation Provided To:   Intervention Detail: New Rx: 1, reason: Patient Preference  Intervention Accepted By:   Gap Closed?:    Time Spent (min): 5

## 2024-08-19 RX ORDER — SERTRALINE HYDROCHLORIDE 25 MG/1
TABLET, FILM COATED ORAL
Qty: 90 TABLET | Refills: 1 | OUTPATIENT
Start: 2024-08-19

## 2024-09-24 ENCOUNTER — TELEPHONE (OUTPATIENT)
Age: 79
End: 2024-09-24

## 2024-09-26 ENCOUNTER — TELEPHONE (OUTPATIENT)
Age: 79
End: 2024-09-26

## 2024-09-26 NOTE — TELEPHONE ENCOUNTER
Patient decided to keep 11/08/24 appointment . Patient would like a refill on Flonase [place] sent to [facility]"[employer]  [phone]. Patient can be reached at [phone].

## 2024-11-08 ENCOUNTER — OFFICE VISIT (OUTPATIENT)
Age: 79
End: 2024-11-08
Payer: MEDICARE

## 2024-11-08 VITALS
WEIGHT: 155.4 LBS | HEIGHT: 63 IN | RESPIRATION RATE: 18 BRPM | TEMPERATURE: 98.9 F | DIASTOLIC BLOOD PRESSURE: 63 MMHG | BODY MASS INDEX: 27.54 KG/M2 | SYSTOLIC BLOOD PRESSURE: 138 MMHG | OXYGEN SATURATION: 98 % | HEART RATE: 77 BPM

## 2024-11-08 DIAGNOSIS — Z79.899 ENCOUNTER FOR LONG-TERM (CURRENT) USE OF MEDICATIONS: ICD-10-CM

## 2024-11-08 DIAGNOSIS — E11.42 TYPE 2 DIABETES MELLITUS WITH DIABETIC POLYNEUROPATHY, WITHOUT LONG-TERM CURRENT USE OF INSULIN (HCC): ICD-10-CM

## 2024-11-08 DIAGNOSIS — F43.23 ADJUSTMENT DISORDER WITH MIXED ANXIETY AND DEPRESSED MOOD: ICD-10-CM

## 2024-11-08 DIAGNOSIS — Z00.00 MEDICARE ANNUAL WELLNESS VISIT, SUBSEQUENT: Primary | ICD-10-CM

## 2024-11-08 DIAGNOSIS — E78.2 MIXED HYPERLIPIDEMIA: ICD-10-CM

## 2024-11-08 DIAGNOSIS — I10 ESSENTIAL (PRIMARY) HYPERTENSION: ICD-10-CM

## 2024-11-08 LAB
ALBUMIN SERPL-MCNC: 4.1 G/DL (ref 3.5–5)
ALBUMIN/GLOB SERPL: 1.3 (ref 1.1–2.2)
ALP SERPL-CCNC: 63 U/L (ref 45–117)
ALT SERPL-CCNC: 11 U/L (ref 12–78)
ANION GAP SERPL CALC-SCNC: 6 MMOL/L (ref 2–12)
APPEARANCE UR: CLEAR
AST SERPL-CCNC: 8 U/L (ref 15–37)
BACTERIA URNS QL MICRO: ABNORMAL /HPF
BILIRUB SERPL-MCNC: 0.6 MG/DL (ref 0.2–1)
BILIRUB UR QL: NEGATIVE
BUN SERPL-MCNC: 12 MG/DL (ref 6–20)
BUN/CREAT SERPL: 24 (ref 12–20)
CALCIUM SERPL-MCNC: 9.9 MG/DL (ref 8.5–10.1)
CHLORIDE SERPL-SCNC: 98 MMOL/L (ref 97–108)
CHOLEST SERPL-MCNC: 216 MG/DL
CO2 SERPL-SCNC: 31 MMOL/L (ref 21–32)
COLOR UR: ABNORMAL
CREAT SERPL-MCNC: 0.5 MG/DL (ref 0.55–1.02)
CREAT UR-MCNC: 30.3 MG/DL
EPITH CASTS URNS QL MICRO: ABNORMAL /LPF
GLOBULIN SER CALC-MCNC: 3.2 G/DL (ref 2–4)
GLUCOSE SERPL-MCNC: 87 MG/DL (ref 65–100)
GLUCOSE UR STRIP.AUTO-MCNC: NEGATIVE MG/DL
GLUCOSE, POC: 104 MG/DL
HBA1C MFR BLD: 6 %
HDLC SERPL-MCNC: 91 MG/DL
HDLC SERPL: 2.4 (ref 0–5)
HGB UR QL STRIP: NEGATIVE
HYALINE CASTS URNS QL MICRO: ABNORMAL /LPF (ref 0–5)
KETONES UR QL STRIP.AUTO: NEGATIVE MG/DL
LDLC SERPL CALC-MCNC: 109.8 MG/DL (ref 0–100)
LEUKOCYTE ESTERASE UR QL STRIP.AUTO: NEGATIVE
MICROALBUMIN UR-MCNC: <0.5 MG/DL
MICROALBUMIN/CREAT UR-RTO: <17 MG/G (ref 0–30)
NITRITE UR QL STRIP.AUTO: NEGATIVE
PH UR STRIP: 7.5 (ref 5–8)
POTASSIUM SERPL-SCNC: 3.7 MMOL/L (ref 3.5–5.1)
PROT SERPL-MCNC: 7.3 G/DL (ref 6.4–8.2)
PROT UR STRIP-MCNC: NEGATIVE MG/DL
RBC #/AREA URNS HPF: ABNORMAL /HPF (ref 0–5)
SODIUM SERPL-SCNC: 135 MMOL/L (ref 136–145)
SP GR UR REFRACTOMETRY: 1.01 (ref 1–1.03)
SPECIMEN HOLD: NORMAL
TRIGL SERPL-MCNC: 76 MG/DL
UROBILINOGEN UR QL STRIP.AUTO: 0.2 EU/DL (ref 0.2–1)
VLDLC SERPL CALC-MCNC: 15.2 MG/DL
WBC URNS QL MICRO: ABNORMAL /HPF (ref 0–4)

## 2024-11-08 PROCEDURE — 83036 HEMOGLOBIN GLYCOSYLATED A1C: CPT | Performed by: FAMILY MEDICINE

## 2024-11-08 PROCEDURE — 82962 GLUCOSE BLOOD TEST: CPT | Performed by: FAMILY MEDICINE

## 2024-11-08 PROCEDURE — 99214 OFFICE O/P EST MOD 30 MIN: CPT | Performed by: FAMILY MEDICINE

## 2024-11-08 PROCEDURE — 90653 IIV ADJUVANT VACCINE IM: CPT | Performed by: FAMILY MEDICINE

## 2024-11-08 SDOH — ECONOMIC STABILITY: FOOD INSECURITY: WITHIN THE PAST 12 MONTHS, THE FOOD YOU BOUGHT JUST DIDN'T LAST AND YOU DIDN'T HAVE MONEY TO GET MORE.: NEVER TRUE

## 2024-11-08 SDOH — ECONOMIC STABILITY: FOOD INSECURITY: WITHIN THE PAST 12 MONTHS, YOU WORRIED THAT YOUR FOOD WOULD RUN OUT BEFORE YOU GOT MONEY TO BUY MORE.: NEVER TRUE

## 2024-11-08 SDOH — ECONOMIC STABILITY: INCOME INSECURITY: HOW HARD IS IT FOR YOU TO PAY FOR THE VERY BASICS LIKE FOOD, HOUSING, MEDICAL CARE, AND HEATING?: NOT VERY HARD

## 2024-11-08 SDOH — HEALTH STABILITY: PHYSICAL HEALTH: ON AVERAGE, HOW MANY MINUTES DO YOU ENGAGE IN EXERCISE AT THIS LEVEL?: 40 MIN

## 2024-11-08 SDOH — HEALTH STABILITY: PHYSICAL HEALTH: ON AVERAGE, HOW MANY DAYS PER WEEK DO YOU ENGAGE IN MODERATE TO STRENUOUS EXERCISE (LIKE A BRISK WALK)?: 4 DAYS

## 2024-11-08 ASSESSMENT — PATIENT HEALTH QUESTIONNAIRE - PHQ9
SUM OF ALL RESPONSES TO PHQ QUESTIONS 1-9: 0
2. FEELING DOWN, DEPRESSED OR HOPELESS: NOT AT ALL
SUM OF ALL RESPONSES TO PHQ9 QUESTIONS 1 & 2: 0
1. LITTLE INTEREST OR PLEASURE IN DOING THINGS: NOT AT ALL
SUM OF ALL RESPONSES TO PHQ QUESTIONS 1-9: 0

## 2024-11-08 ASSESSMENT — ANXIETY QUESTIONNAIRES
4. TROUBLE RELAXING: NOT AT ALL
IF YOU CHECKED OFF ANY PROBLEMS ON THIS QUESTIONNAIRE, HOW DIFFICULT HAVE THESE PROBLEMS MADE IT FOR YOU TO DO YOUR WORK, TAKE CARE OF THINGS AT HOME, OR GET ALONG WITH OTHER PEOPLE: NOT DIFFICULT AT ALL
2. NOT BEING ABLE TO STOP OR CONTROL WORRYING: NOT AT ALL
5. BEING SO RESTLESS THAT IT IS HARD TO SIT STILL: NOT AT ALL
GAD7 TOTAL SCORE: 0
7. FEELING AFRAID AS IF SOMETHING AWFUL MIGHT HAPPEN: NOT AT ALL
3. WORRYING TOO MUCH ABOUT DIFFERENT THINGS: NOT AT ALL
1. FEELING NERVOUS, ANXIOUS, OR ON EDGE: NOT AT ALL
6. BECOMING EASILY ANNOYED OR IRRITABLE: NOT AT ALL
GAD7 TOTAL SCORE: 0

## 2024-11-08 ASSESSMENT — LIFESTYLE VARIABLES
HOW OFTEN DO YOU HAVE SIX OR MORE DRINKS ON ONE OCCASION: 1
HOW OFTEN DO YOU HAVE A DRINK CONTAINING ALCOHOL: 1
HOW MANY STANDARD DRINKS CONTAINING ALCOHOL DO YOU HAVE ON A TYPICAL DAY: 0
HOW MANY STANDARD DRINKS CONTAINING ALCOHOL DO YOU HAVE ON A TYPICAL DAY: PATIENT DOES NOT DRINK
HOW OFTEN DO YOU HAVE A DRINK CONTAINING ALCOHOL: NEVER

## 2024-11-08 ASSESSMENT — ENCOUNTER SYMPTOMS
BLOOD IN STOOL: 0
COUGH: 0
SHORTNESS OF BREATH: 0
RHINORRHEA: 0
ABDOMINAL PAIN: 0
CHEST TIGHTNESS: 0
BACK PAIN: 0
CONSTIPATION: 0

## 2024-11-08 NOTE — PROGRESS NOTES
Chief Complaint   Patient presents with    Medicare AWV         Here for annual medicare  wellness exam.        \"Have you been to the ER, urgent care clinic since your last visit?  Hospitalized since your last visit?\"    NO    “Have you seen or consulted any other health care providers outside of [facility] since your last visit?”    NO            Click Here for Release of Records Request    
Medicare Annual Wellness Visit    Gem Nicole is here for Medicare AWV    Assessment & Plan   Medicare annual wellness visit, subsequent  Essential (primary) hypertension  Type 2 diabetes mellitus with diabetic polyneuropathy, without long-term current use of insulin (HCC)  -     AMB POC GLUCOSE BLOOD, BY GLUCOSE MONITORING DEVICE  -     AMB POC HEMOGLOBIN A1C  -     Microalbumin / Creatinine Urine Ratio; Future  Mixed hyperlipidemia  -     Lipid Panel; Future  Adjustment disorder with mixed anxiety and depressed mood  Encounter for long-term (current) use of medications  -     Comprehensive Metabolic Panel; Future  -     Urinalysis with Microscopic; Future    Recommendations for Preventive Services Due: see orders and patient instructions/AVS.  Recommended screening schedule for the next 5-10 years is provided to the patient in written form: see Patient Instructions/AVS.     Return in about 4 months (around 3/8/2025).     Subjective   Patient's complete Health Risk Assessment and screening values have been reviewed and are found in Flowsheets. The following problems were reviewed today and where indicated follow up appointments were made and/or referrals ordered.    Positive Risk Factor Screenings with Interventions:                      Advanced Directives:  Do you have a Living Will?: (!) No    Intervention:  has NO advanced directive - information provided             Objective   Vitals:    11/08/24 1007 11/08/24 1008   BP: (!) 145/72 138/63   Pulse: 77    Resp: 18    Temp: 98.9 °F (37.2 °C)    SpO2: 98%    Weight: 70.5 kg (155 lb 6.4 oz)    Height: 1.6 m (5' 3\")       Body mass index is 27.53 kg/m².                 Allergies   Allergen Reactions    Pravastatin Myalgia    Rosuvastatin Myalgia     Prior to Visit Medications    Medication Sig Taking? Authorizing Provider   metFORMIN (GLUCOPHAGE) 500 MG tablet TAKE 1 TABLET BY MOUTH TWICE DAILY WITH MEALS Yes Pema Angel MD   hydroCHLOROthiazide 
patient is not nervous/anxious.        Objective:   Physical Exam  Constitutional:       Appearance: Normal appearance.      Comments: /63   Pulse 77   Temp 98.9 °F (37.2 °C)   Resp 18   Ht 1.6 m (5' 3\")   Wt 70.5 kg (155 lb 6.4 oz)   SpO2 98%   BMI 27.53 kg/m²    HENT:      Right Ear: Tympanic membrane normal.      Left Ear: Tympanic membrane normal.      Nose: No rhinorrhea.      Mouth/Throat:      Mouth: Mucous membranes are moist.   Cardiovascular:      Rate and Rhythm: Normal rate and regular rhythm.   Pulmonary:      Effort: Pulmonary effort is normal.      Breath sounds: Normal breath sounds.   Abdominal:      General: Bowel sounds are normal.      Palpations: Abdomen is soft.      Tenderness: There is no abdominal tenderness.   Musculoskeletal:         General: Normal range of motion.      Cervical back: Normal range of motion and neck supple.      Right lower leg: No edema.      Left lower leg: No edema.   Lymphadenopathy:      Cervical: No cervical adenopathy.   Skin:     General: Skin is warm and dry.   Neurological:      General: No focal deficit present.      Mental Status: She is alert and oriented to person, place, and time.   Psychiatric:         Mood and Affect: Mood normal.     Diabetic foot exam:   Left Foot:   Visual Exam: normal   Pulse DP: 2+ (normal)   Filament test: normal sensation   Vibratory Sensation: normal  Right Foot:   Visual Exam: normal   Pulse DP: 2+ (normal)   Filament test: normal sensation   Vibratory Sensation: normal        Assessment and Plan:   ASSESSMENT and PLAN  Gem was seen today for medicare awv.    Diagnoses and all orders for this visit:    Medicare annual wellness visit, subsequent    Essential (primary) hypertension  Discussed sodium restriction, high k rich diet, maintaining ideal body weight and regular exercise program such as daily walking 30 min perday 4-5 times per week, as physiologic means to achieve blood pressure control.  Medication

## 2024-11-21 ENCOUNTER — HOSPITAL ENCOUNTER (OUTPATIENT)
Facility: HOSPITAL | Age: 79
Discharge: HOME | End: 2024-11-21
Payer: MEDICARE

## 2024-11-21 VITALS — WEIGHT: 155 LBS | BODY MASS INDEX: 27.46 KG/M2 | HEIGHT: 63 IN

## 2024-11-21 DIAGNOSIS — Z85.3 HISTORY OF BREAST CANCER IN FEMALE: ICD-10-CM

## 2024-11-21 PROCEDURE — G0279 TOMOSYNTHESIS, MAMMO: HCPCS

## 2024-11-21 PROCEDURE — 77066 DX MAMMO INCL CAD BI: CPT

## 2024-12-07 ENCOUNTER — HOSPITAL ENCOUNTER (EMERGENCY)
Facility: HOSPITAL | Age: 79
Discharge: HOME | End: 2024-12-07
Attending: EMERGENCY MEDICINE
Payer: MEDICARE

## 2024-12-07 VITALS
TEMPERATURE: 99.5 F | HEIGHT: 63 IN | BODY MASS INDEX: 27.46 KG/M2 | HEART RATE: 72 BPM | SYSTOLIC BLOOD PRESSURE: 146 MMHG | DIASTOLIC BLOOD PRESSURE: 71 MMHG | OXYGEN SATURATION: 100 % | WEIGHT: 155 LBS | RESPIRATION RATE: 18 BRPM

## 2024-12-07 DIAGNOSIS — R52 GENERALIZED BODY ACHES: ICD-10-CM

## 2024-12-07 DIAGNOSIS — J01.90 ACUTE NON-RECURRENT SINUSITIS, UNSPECIFIED LOCATION: Primary | ICD-10-CM

## 2024-12-07 DIAGNOSIS — I10 ACCELERATED HYPERTENSION: ICD-10-CM

## 2024-12-07 DIAGNOSIS — J11.1 INFLUENZA-LIKE ILLNESS: ICD-10-CM

## 2024-12-07 LAB
FLUAV RNA SPEC QL NAA+PROBE: NOT DETECTED
FLUBV RNA SPEC QL NAA+PROBE: NOT DETECTED
SARS-COV-2 RNA RESP QL NAA+PROBE: NOT DETECTED
SOURCE: NORMAL

## 2024-12-07 PROCEDURE — 87636 SARSCOV2 & INF A&B AMP PRB: CPT

## 2024-12-07 PROCEDURE — 6370000000 HC RX 637 (ALT 250 FOR IP): Performed by: EMERGENCY MEDICINE

## 2024-12-07 PROCEDURE — 99283 EMERGENCY DEPT VISIT LOW MDM: CPT

## 2024-12-07 RX ORDER — NAPROXEN 250 MG/1
500 TABLET ORAL ONCE
Status: COMPLETED | OUTPATIENT
Start: 2024-12-07 | End: 2024-12-07

## 2024-12-07 RX ORDER — AZITHROMYCIN 250 MG/1
TABLET, FILM COATED ORAL
Qty: 6 TABLET | Refills: 0 | Status: SHIPPED | OUTPATIENT
Start: 2024-12-07 | End: 2024-12-17

## 2024-12-07 RX ORDER — AZITHROMYCIN 500 MG/1
500 TABLET, FILM COATED ORAL
Status: COMPLETED | OUTPATIENT
Start: 2024-12-07 | End: 2024-12-07

## 2024-12-07 RX ORDER — ACETAMINOPHEN 325 MG/1
650 TABLET ORAL EVERY 6 HOURS PRN
Qty: 120 TABLET | Refills: 0 | Status: SHIPPED | OUTPATIENT
Start: 2024-12-07

## 2024-12-07 RX ORDER — NAPROXEN 500 MG/1
500 TABLET ORAL 2 TIMES DAILY
Qty: 60 TABLET | Refills: 0 | Status: SHIPPED | OUTPATIENT
Start: 2024-12-07

## 2024-12-07 RX ORDER — OXYMETAZOLINE HYDROCHLORIDE 0.05 G/100ML
2 SPRAY NASAL ONCE
Status: COMPLETED | OUTPATIENT
Start: 2024-12-07 | End: 2024-12-07

## 2024-12-07 RX ADMIN — AZITHROMYCIN 500 MG: 500 TABLET, FILM COATED ORAL at 02:44

## 2024-12-07 RX ADMIN — OXYMETAZOLINE HYDROCHLORIDE 2 SPRAY: 0.5 SPRAY NASAL at 02:44

## 2024-12-07 RX ADMIN — NAPROXEN 500 MG: 250 TABLET ORAL at 02:44

## 2024-12-07 ASSESSMENT — PAIN DESCRIPTION - DESCRIPTORS: DESCRIPTORS: ACHING

## 2024-12-07 ASSESSMENT — PAIN - FUNCTIONAL ASSESSMENT: PAIN_FUNCTIONAL_ASSESSMENT: 0-10

## 2024-12-07 ASSESSMENT — PAIN DESCRIPTION - LOCATION: LOCATION: GENERALIZED

## 2024-12-07 ASSESSMENT — ENCOUNTER SYMPTOMS
VOMITING: 0
RHINORRHEA: 1
COUGH: 0
DIARRHEA: 0
NAUSEA: 0
ABDOMINAL PAIN: 0
SORE THROAT: 0
SHORTNESS OF BREATH: 0
EYE PAIN: 0

## 2024-12-07 ASSESSMENT — PAIN DESCRIPTION - PAIN TYPE: TYPE: ACUTE PAIN

## 2024-12-07 ASSESSMENT — PAIN SCALES - GENERAL: PAINLEVEL_OUTOF10: 10

## 2024-12-07 NOTE — DISCHARGE INSTRUCTIONS
Thank You!    It was a pleasure taking care of you in our Emergency Department today. We know that when you come to [facility], you are entrusting us with your health, comfort, and safety. Our physicians and nurses honor that trust, and truly appreciate the opportunity to care for you and your loved ones.      We also value your feedback. If you receive a survey about your Emergency Department experience today, please fill it out.  We care about our patients' feedback, and we listen to what you have to say. Thank you.    Dr. Ervin Szymanski M.D.      ____________________________________________________________________  I have included a copy of your lab results and/or radiologic studies from today's visit so you can have them easily available at your follow-up visit. We hope you feel better and please do not hesitate to contact the ED if you have any questions at all!    Recent Results (from the past 12 hour(s))   COVID-19 & Influenza Combo    Collection Time: 12/07/24  2:27 AM    Specimen: Nasopharyngeal   Result Value Ref Range    Source Nasopharyngeal      SARS-CoV-2, PCR Not detected NOTD      Rapid Influenza A By PCR Not detected NOTD      Rapid Influenza B By PCR Not detected NOTD         No orders to display     [unfilled]  The exam and treatment you received in the Emergency Department were for an urgent problem and are not intended as complete care. It is important that you follow up with a doctor, nurse practitioner, or physician assistant for ongoing care. If your symptoms become worse or you do not improve as expected and you are unable to reach your usual health care provider, you should return to the Emergency Department. We are available 24 hours a day.    Please take your discharge instructions with you when you go to your follow-up appointment.     If a prescription has been provided, please have it filled as soon as possible to prevent a delay in treatment. Read the entire

## 2024-12-07 NOTE — ED NOTES
Discharge instructions were given to the patient by DANIEL Fernandez.     The patient left the Emergency Department alert and oriented and in no acute distress with 3 prescriptions. The patient was encouraged to call or return to the ED for worsening issues or problems and was encouraged to schedule a follow up appointment for continuing care.     Ambulation assessment completed before discharge.  Pt left Emergency Department ambulating at baseline with no ortho devices  Ortho device education: none    The patient verbalized understanding of discharge instructions and prescriptions, all questions were answered. The patient has no further concerns at this time.

## 2024-12-07 NOTE — ED NOTES
Pt presents ambulatory to ED complaining of generalized body aches & nasal congestion for 3 days. Pt is alert and oriented x 4, RR even and unlabored, skin is warm and dry. Assesment completed and pt updated on plan of care.       Emergency Department Nursing Plan of Care       The Nursing Plan of Care is developed from the Nursing assessment and Emergency Department Attending provider initial evaluation.  The plan of care may be reviewed in the “ED Provider note”.    The Plan of Care was developed with the following considerations:   Patient / Family readiness to learn indicated by:verbalized understanding  Persons(s) to be included in education: patient  Barriers to Learning/Limitations:None    Signed     Rebecca Pandya RN    12/7/2024   3:18 AM

## 2024-12-07 NOTE — ED PROVIDER NOTES
EMERGENCY DEPARTMENT HISTORY AND PHYSICAL EXAM            Please note that this dictation was completed with the assistance of \"Dragon\", the computer voice recognition software. Quite often unanticipated grammatical, syntax, homophones, and other interpretive errors are inadvertently transcribed by the computer software. Please disregard these errors and any errors that have escaped final proofreading. Thank you.    Date of Evaluation: 12/07/24  Patient: Gem Nicole  Patient Age and Sex: 79 y.o. female   MRN: [id number]  CSN: [id number]  PCP: Pema Angel MD    History of Present Illness     Chief Complaint   Patient presents with    Generalized Body Aches     History Provided By: Patient/family/EMS (if available)    History is limited by: Nothing     HPI: Gme Nicole, 79 y.o. female with past medical history as documented below presents to the ED with c/o of 3 days of generalized bodyaches, nasal congestion.  Patient reports taking over-the-counter medications without relief of symptoms.  Denies any associated fever, no chest pain or shortness of breath.. Pt denies any other exacerbating or ameliorating factors. There are no other complaints, changes or physical findings pertinent to the HPI at this time.    Nursing notes were all reviewed and agreed with or any disagreements were addressed in the HPI.    Past History   Past Medical History:  Past Medical History:   Diagnosis Date    Arthritis     knee    Breast cancer (HCC)     Cancer (HCC) 2003    melanoma -right lower extremity    Chronic pain     left knee & left shoulder    Diabetes (HCC)     type 2    Hypertension     Nausea & vomiting     Screen for colon cancer 10/2/2014       Past Surgical History:  Past Surgical History:   Procedure Laterality Date    BREAST BIOPSY Right     yrs ago    BREAST BIOPSY Left 06/2021    BREAST LUMPECTOMY Left 8/18/2021    LEFT BREAST LUMPECTOMY WITH NEEDLE LOCALIZATION (1100) performed by Aly Katz  Conjunctiva/sclera: Conjunctivae normal.      Pupils: Pupils are equal, round, and reactive to light.   Cardiovascular:      Rate and Rhythm: Normal rate and regular rhythm.      Heart sounds: No murmur heard.     No friction rub. No gallop.   Pulmonary:      Effort: Pulmonary effort is normal. No respiratory distress.      Breath sounds: No wheezing, rhonchi or rales.   Abdominal:      General: Bowel sounds are normal.      Palpations: Abdomen is soft.      Tenderness: There is no abdominal tenderness. There is no guarding or rebound.   Musculoskeletal:         General: No swelling, tenderness or deformity. Normal range of motion.      Cervical back: Normal range of motion and neck supple. No rigidity.   Lymphadenopathy:      Cervical: No cervical adenopathy.   Skin:     General: Skin is warm and dry.      Findings: No rash.   Neurological:      General: No focal deficit present.      Mental Status: She is alert and oriented to person, place, and time.   Psychiatric:         Mood and Affect: Mood normal.         Behavior: Behavior normal.       Diagnostic Studies     LABORATORY RESULTS:  I have personally reviewed and interpreted all available laboratory results   Recent Results (from the past 24 hour(s))   COVID-19 & Influenza Combo    Collection Time: 12/07/24  2:27 AM    Specimen: Nasopharyngeal   Result Value Ref Range    Source Nasopharyngeal      SARS-CoV-2, PCR Not detected NOTD      Rapid Influenza A By PCR Not detected NOTD      Rapid Influenza B By PCR Not detected NOTD         RADIOLOGY RESULTS:  I have personally reviewed and interpreted all available imaging studies and agree with radiology interpretation.  No orders to display       No orders to display                MEDICAL DECISION MAKING & ED COURSE   I am the first and primary ED physician for this patient's ED visit today.    I reviewed our EMR for any past records that may contribute to the patient's current condition, including their past

## 2024-12-10 NOTE — MR AVS SNAPSHOT
[address] [facility] 
 
 
 [address] [id number] [phone] Patient: Kaylah Cline MRN: [id number] SONYA:[birth year] Visit Information Date & Time Provider Department Dept. Phone Encounter #  
 5/16/2018  9:15 AM Renato Beyer MD [facility] [phone] [id number] Follow-up Instructions Return in about 5 months (around 10/16/2018). Upcoming Health Maintenance Date Due  
 MEDICARE YEARLY EXAM 5/2/2018 EYE EXAM RETINAL OR DILATED Q1 5/17/2018* HEMOGLOBIN A1C Q6M 7/16/2018 Influenza Age 5 to Adult 8/1/2018 MICROALBUMIN Q1 1/16/2019 LIPID PANEL Q1 1/16/2019 GLAUCOMA SCREENING Q2Y 2/1/2019 FOOT EXAM Q1 5/16/2019 BREAST CANCER SCRN MAMMOGRAM 12/4/2019 DTaP/Tdap/Td series (2 - Td) 2/10/2022 COLONOSCOPY 3/4/2026 *Topic was postponed. The date shown is not the original due date. Allergies as of 5/16/2018  Review Complete On: 5/16/2018 By: Renato Beyer MD  
 No Known Allergies Current Immunizations  Reviewed on 5/16/2018 Name Date Influenza High Dose Vaccine PF 9/5/2017, 1/24/2017, 12/22/2015 Influenza Vaccine 10/15/2014, 10/21/2013 Influenza Vaccine Split 12/17/2012 Pneumococcal Conjugate (PCV-13) 7/14/2015 TDAP Vaccine 2/10/2012 ZZZ-RETIRED (DO NOT USE) Pneumococcal Vaccine (Unspecified Type) 2/10/2012 Reviewed by Renato Beyer MD on 5/16/2018 at 10:27 AM  
You Were Diagnosed With   
  
 Codes Comments Routine general medical examination at a health care facility    -  Primary ICD-10-CM: Z00.00 ICD-9-CM: V70.0 Essential hypertension     ICD-10-CM: I10 
ICD-9-CM: 401.9 Type 2 diabetes mellitus without complication, without long-term current use of insulin (HCC)     ICD-10-CM: E11.9 ICD-9-CM: 250.00 Mixed hyperlipidemia     ICD-10-CM: E78.2 ICD-9-CM: 272.2 I have attempted to contact pt via phone, I am unable to reach. Pts vm box is full.    I did contact pts hh nurse and she states that she has issues getting the pt to answer as well. I advised her that zofran was sent.     Will attempt to contact pt again.    Encounter for medication monitoring     ICD-10-CM: Z51.81 
ICD-9-CM: V58.83 Encounter for screening fecal occult blood testing     ICD-10-CM: Z12.11 ICD-9-CM: V76.51 Long-term use of aspirin therapy     ICD-10-CM: Z79.82 ICD-9-CM: V58.66 Need for shingles vaccine     ICD-10-CM: B13 ICD-9-CM: V04.89 Vitals BP Pulse Temp Resp Height(growth percentile) Weight(growth percentile) 129/73 (BP 1 Location: Left arm, BP Patient Position: Sitting) 63 97.7 °F (36.5 °C) (Oral) 18 5' 3\" (1.6 m) 178 lb (80.7 kg) LMP SpO2 BMI OB Status Smoking Status 05/10/1990 99% 31.53 kg/m2 Hysterectomy Never Smoker BMI and BSA Data Body Mass Index Body Surface Area  
 31.53 kg/m 2 1.89 m 2 Preferred Pharmacy Pharmacy Name Phone [facility] [address]  [place] [phone] Your Updated Medication List  
  
   
This list is accurate as of 5/16/18 10:32 AM.  Always use your most recent med list.  
  
  
  
  
 albuterol 90 mcg/actuation inhaler Commonly known as:  PROVENTIL HFA, VENTOLIN HFA, PROAIR HFA Take 2 Puffs by inhalation every four (4) hours as needed for Wheezing. alcohol swabs Padm Commonly known as:  BD Single Use Swabs Regular Use to cleanse finger prior to checking glucose level. aspirin delayed-release 81 mg tablet Take 1 Tab by mouth daily. atorvastatin 10 mg tablet Commonly known as:  LIPITOR  
TAKE 1 TABLET BY MOUTH EVERY DAY  
  
 CLARITIN 10 mg tablet Generic drug:  loratadine Take 1 tablet by mouth daily as needed for Allergies. fluticasone 50 mcg/actuation nasal spray Commonly known as:  Welby Galla SHAKE LIQUID AND USE 2 SPRAYS IN EACH NOSTRIL DAILY AS NEEDED FOR RHINITIS Lancets Misc Commonly known as:  PRODIGY TWIST TOP LANCET Use to check blood glucose level twice a day and PRN. losartan-hydroCHLOROthiazide 100-12.5 mg per tablet Commonly known as:  HYZAAR  
TAKE 1 TABLET EVERY DAY  
  
 meloxicam 15 mg tablet Commonly known as:  MOBIC Take 15 mg by mouth as needed for Pain.  
  
 metFORMIN 500 mg tablet Commonly known as:  GLUCOPHAGE  
TAKE 2 TABLETS EVERY MORNING AND TAKE 1 TABLET WITH DINNER  
  
 NORVASC 10 mg tablet Generic drug:  amLODIPine Take 5 mg by mouth daily. omega 3-DHA-EPA-fish oil 1,000 mg (120 mg-180 mg) capsule Take 2 Caps by mouth every seven (7) days. TRUE METRIX GLUCOSE TEST STRIP strip Generic drug:  glucose blood VI test strips TEST BLOOD SUGAR TWICE DAILY  AND AS NEEDED  
  
 varicella-zoster recombinant (PF) 50 mcg/0.5 mL Susr injection Commonly known as:  SHINGRIX  
0.5 mL by IntraMUSCular route once for 1 dose. Repeat second dose in 6 months. VITAMIN D3 5,000 unit Tab tablet Generic drug:  cholecalciferol (VITAMIN D3) Take 1 Cap by mouth daily. Prescriptions Printed Refills  
 varicella-zoster recombinant, PF, (SHINGRIX) 50 mcg/0.5 mL susr injection 1 Si.5 mL by IntraMUSCular route once for 1 dose. Repeat second dose in 6 months. Class: Print Route: IntraMUSCular We Performed the Following AMB POC FECAL BLOOD, OCCULT, QL 1 CARD [24756 CPT(R)] AMB POC GLUCOSE, QUANTITATIVE, BLOOD [36873 CPT(R)] AMB POC HEMOGLOBIN A1C [61882 CPT(R)] AMB POC URINALYSIS DIP STICK AUTO W/ MICRO  [58578 CPT(R)] CBC W/O DIFF [10892 CPT(R)] LIPID PANEL [59929 CPT(R)] METABOLIC PANEL, COMPREHENSIVE [01046 CPT(R)] MICROALBUMIN, UR, RAND W/ MICROALB/CREAT RATIO H218530 CPT(R)] Follow-up Instructions Return in about 5 months (around 10/16/2018). Introducing [facility]! [facility] introduces [email or web address] patient portal. Now you can access parts of your medical record, email your doctor's office, and request medication refills online. 1. In your internet browser, go to [email or web address] 2. Click on the First Time User? Click Here link in the Sign In box. You will see the New Member Sign Up page. 3. Enter your [employer] Access Code exactly as it appears below. You will not need to use this code after youve completed the sign-up process. If you do not sign up before the expiration date, you must request a new code. · [employer] Access Code: [email or web address] Expires: 6/11/2018  5:31 AM 
 
4. Enter the last four digits of your Social Security Number (xxxx) and Date of Birth (mm/dd/yyyy) as indicated and click Submit. You will be taken to the next sign-up page. 5. Create a Ter[employer]Pas[employer] ID. This will be your [employer] login ID and cannot be changed, so think of one that is secure and easy to remember. 6. Create a [employer] password. You can change your password at any time. 7. Enter your Password Reset Question and Answer. This can be used at a later time if you forget your password. 8. Enter your e-mail address. You will receive e-mail notification when new information is available in [address] 9. Click Sign Up. You can now view and download portions of your medical record. 10. Click the Download Summary menu link to download a portable copy of your medical information. If you have questions, please visit the Frequently Asked Questions section of the [employer] website. Remember, [employer] is NOT to be used for urgent needs. For medical emergencies, dial 911. Now available from your iPhone and Android! Please provide this summary of care documentation to your next provider. Your primary care clinician is listed as Davion Camacho. If you have any questions after today's visit, please call [phone].

## 2024-12-27 ENCOUNTER — HOSPITAL ENCOUNTER (EMERGENCY)
Facility: HOSPITAL | Age: 79
Discharge: HOME | End: 2024-12-27
Payer: MEDICARE

## 2024-12-27 ENCOUNTER — APPOINTMENT (OUTPATIENT)
Facility: HOSPITAL | Age: 79
End: 2024-12-27
Payer: MEDICARE

## 2024-12-27 VITALS
SYSTOLIC BLOOD PRESSURE: 127 MMHG | HEIGHT: 63 IN | OXYGEN SATURATION: 100 % | BODY MASS INDEX: 26.8 KG/M2 | TEMPERATURE: 100.2 F | RESPIRATION RATE: 16 BRPM | HEART RATE: 94 BPM | DIASTOLIC BLOOD PRESSURE: 63 MMHG | WEIGHT: 151.24 LBS

## 2024-12-27 DIAGNOSIS — R11.2 NAUSEA VOMITING AND DIARRHEA: Primary | ICD-10-CM

## 2024-12-27 DIAGNOSIS — E87.6 HYPOKALEMIA: ICD-10-CM

## 2024-12-27 DIAGNOSIS — N39.0 ACUTE UTI: ICD-10-CM

## 2024-12-27 DIAGNOSIS — R19.7 NAUSEA VOMITING AND DIARRHEA: Primary | ICD-10-CM

## 2024-12-27 DIAGNOSIS — R10.9 ACUTE ABDOMINAL PAIN: ICD-10-CM

## 2024-12-27 DIAGNOSIS — R93.89 ABNORMAL CT SCAN: ICD-10-CM

## 2024-12-27 DIAGNOSIS — K52.9 COLITIS: ICD-10-CM

## 2024-12-27 LAB
ALBUMIN SERPL-MCNC: 3.7 G/DL (ref 3.5–5)
ALBUMIN/GLOB SERPL: 1 (ref 1.1–2.2)
ALP SERPL-CCNC: 74 U/L (ref 45–117)
ALT SERPL-CCNC: 21 U/L (ref 12–78)
ANION GAP SERPL CALC-SCNC: 10 MMOL/L (ref 2–12)
APPEARANCE UR: ABNORMAL
AST SERPL-CCNC: 18 U/L (ref 15–37)
BACTERIA URNS QL MICRO: ABNORMAL /HPF
BASOPHILS # BLD: 0 K/UL (ref 0–0.1)
BASOPHILS NFR BLD: 0 % (ref 0–1)
BILIRUB SERPL-MCNC: 1 MG/DL (ref 0.2–1)
BILIRUB UR QL: NEGATIVE
BUN SERPL-MCNC: 21 MG/DL (ref 6–20)
BUN/CREAT SERPL: 28 (ref 12–20)
CALCIUM SERPL-MCNC: 8.8 MG/DL (ref 8.5–10.1)
CHLORIDE SERPL-SCNC: 99 MMOL/L (ref 97–108)
CO2 SERPL-SCNC: 30 MMOL/L (ref 21–32)
COLOR UR: ABNORMAL
CREAT SERPL-MCNC: 0.76 MG/DL (ref 0.55–1.02)
DIFFERENTIAL METHOD BLD: ABNORMAL
EKG ATRIAL RATE: 82 BPM
EKG DIAGNOSIS: NORMAL
EKG P AXIS: 58 DEGREES
EKG P-R INTERVAL: 146 MS
EKG Q-T INTERVAL: 362 MS
EKG QRS DURATION: 82 MS
EKG QTC CALCULATION (BAZETT): 422 MS
EKG R AXIS: 30 DEGREES
EKG T AXIS: 147 DEGREES
EKG VENTRICULAR RATE: 82 BPM
EOSINOPHIL # BLD: 0 K/UL (ref 0–0.4)
EOSINOPHIL NFR BLD: 0 % (ref 0–7)
EPITH CASTS URNS QL MICRO: ABNORMAL /LPF
ERYTHROCYTE [DISTWIDTH] IN BLOOD BY AUTOMATED COUNT: 12.3 % (ref 11.5–14.5)
FLUAV RNA SPEC QL NAA+PROBE: NOT DETECTED
FLUBV RNA SPEC QL NAA+PROBE: NOT DETECTED
GLOBULIN SER CALC-MCNC: 3.8 G/DL (ref 2–4)
GLUCOSE SERPL-MCNC: 131 MG/DL (ref 65–100)
GLUCOSE UR STRIP.AUTO-MCNC: NEGATIVE MG/DL
HCT VFR BLD AUTO: 37.7 % (ref 35–47)
HGB BLD-MCNC: 12.7 G/DL (ref 11.5–16)
HGB UR QL STRIP: NEGATIVE
IMM GRANULOCYTES # BLD AUTO: 0 K/UL (ref 0–0.04)
IMM GRANULOCYTES NFR BLD AUTO: 0 % (ref 0–0.5)
KETONES UR QL STRIP.AUTO: ABNORMAL MG/DL
LEUKOCYTE ESTERASE UR QL STRIP.AUTO: ABNORMAL
LIPASE SERPL-CCNC: 14 U/L (ref 13–75)
LYMPHOCYTES # BLD: 0.1 K/UL (ref 0.8–3.5)
LYMPHOCYTES NFR BLD: 2 % (ref 12–49)
MCH RBC QN AUTO: 31.4 PG (ref 26–34)
MCHC RBC AUTO-ENTMCNC: 33.7 G/DL (ref 30–36.5)
MCV RBC AUTO: 93.1 FL (ref 80–99)
MONOCYTES # BLD: 0.2 K/UL (ref 0–1)
MONOCYTES NFR BLD: 4 % (ref 5–13)
NEUTS SEG # BLD: 4.8 K/UL (ref 1.8–8)
NEUTS SEG NFR BLD: 94 % (ref 32–75)
NITRITE UR QL STRIP.AUTO: POSITIVE
NRBC # BLD: 0 K/UL (ref 0–0.01)
NRBC BLD-RTO: 0 PER 100 WBC
PH UR STRIP: 5.5 (ref 5–8)
PLATELET # BLD AUTO: 220 K/UL (ref 150–400)
PMV BLD AUTO: 10.2 FL (ref 8.9–12.9)
POTASSIUM SERPL-SCNC: 2.9 MMOL/L (ref 3.5–5.1)
PROT SERPL-MCNC: 7.5 G/DL (ref 6.4–8.2)
PROT UR STRIP-MCNC: ABNORMAL MG/DL
RBC # BLD AUTO: 4.05 M/UL (ref 3.8–5.2)
RBC #/AREA URNS HPF: ABNORMAL /HPF (ref 0–5)
RBC MORPH BLD: ABNORMAL
SARS-COV-2 RNA RESP QL NAA+PROBE: NOT DETECTED
SODIUM SERPL-SCNC: 139 MMOL/L (ref 136–145)
SOURCE: NORMAL
SP GR UR REFRACTOMETRY: 1.03 (ref 1–1.03)
TROPONIN I SERPL HS-MCNC: 67 NG/L (ref 0–51)
TROPONIN I SERPL HS-MCNC: 80 NG/L (ref 0–51)
URINE CULTURE IF INDICATED: ABNORMAL
UROBILINOGEN UR QL STRIP.AUTO: 1 EU/DL (ref 0.2–1)
WBC # BLD AUTO: 5.1 K/UL (ref 3.6–11)
WBC URNS QL MICRO: ABNORMAL /HPF (ref 0–4)

## 2024-12-27 PROCEDURE — 96361 HYDRATE IV INFUSION ADD-ON: CPT

## 2024-12-27 PROCEDURE — 99285 EMERGENCY DEPT VISIT HI MDM: CPT

## 2024-12-27 PROCEDURE — 83690 ASSAY OF LIPASE: CPT

## 2024-12-27 PROCEDURE — 87088 URINE BACTERIA CULTURE: CPT

## 2024-12-27 PROCEDURE — 80053 COMPREHEN METABOLIC PANEL: CPT

## 2024-12-27 PROCEDURE — 87636 SARSCOV2 & INF A&B AMP PRB: CPT

## 2024-12-27 PROCEDURE — 74177 CT ABD & PELVIS W/CONTRAST: CPT

## 2024-12-27 PROCEDURE — 87186 SC STD MICRODIL/AGAR DIL: CPT

## 2024-12-27 PROCEDURE — 84484 ASSAY OF TROPONIN QUANT: CPT

## 2024-12-27 PROCEDURE — 6360000002 HC RX W HCPCS: Performed by: PHYSICIAN ASSISTANT

## 2024-12-27 PROCEDURE — 96374 THER/PROPH/DIAG INJ IV PUSH: CPT

## 2024-12-27 PROCEDURE — 93010 ELECTROCARDIOGRAM REPORT: CPT | Performed by: SPECIALIST

## 2024-12-27 PROCEDURE — 2580000003 HC RX 258: Performed by: PHYSICIAN ASSISTANT

## 2024-12-27 PROCEDURE — 6370000000 HC RX 637 (ALT 250 FOR IP): Performed by: PHYSICIAN ASSISTANT

## 2024-12-27 PROCEDURE — 87086 URINE CULTURE/COLONY COUNT: CPT

## 2024-12-27 PROCEDURE — 85025 COMPLETE CBC W/AUTO DIFF WBC: CPT

## 2024-12-27 PROCEDURE — 93005 ELECTROCARDIOGRAM TRACING: CPT | Performed by: PHYSICIAN ASSISTANT

## 2024-12-27 PROCEDURE — 36415 COLL VENOUS BLD VENIPUNCTURE: CPT

## 2024-12-27 PROCEDURE — 81001 URINALYSIS AUTO W/SCOPE: CPT

## 2024-12-27 PROCEDURE — 6360000004 HC RX CONTRAST MEDICATION: Performed by: PHYSICIAN ASSISTANT

## 2024-12-27 RX ORDER — METRONIDAZOLE 500 MG/1
500 TABLET ORAL
Status: COMPLETED | OUTPATIENT
Start: 2024-12-27 | End: 2024-12-27

## 2024-12-27 RX ORDER — CIPROFLOXACIN 500 MG/1
500 TABLET, FILM COATED ORAL 2 TIMES DAILY
Qty: 14 TABLET | Refills: 0 | Status: SHIPPED | OUTPATIENT
Start: 2024-12-27 | End: 2025-01-03

## 2024-12-27 RX ORDER — 0.9 % SODIUM CHLORIDE 0.9 %
500 INTRAVENOUS SOLUTION INTRAVENOUS ONCE
Status: COMPLETED | OUTPATIENT
Start: 2024-12-27 | End: 2024-12-27

## 2024-12-27 RX ORDER — METRONIDAZOLE 500 MG/1
500 TABLET ORAL 2 TIMES DAILY
Qty: 14 TABLET | Refills: 0 | Status: SHIPPED | OUTPATIENT
Start: 2024-12-27 | End: 2025-01-03

## 2024-12-27 RX ORDER — ACETAMINOPHEN 500 MG
1000 TABLET ORAL
Status: COMPLETED | OUTPATIENT
Start: 2024-12-27 | End: 2024-12-27

## 2024-12-27 RX ORDER — ACETAMINOPHEN 325 MG/1
650 TABLET ORAL EVERY 6 HOURS PRN
Qty: 30 TABLET | Refills: 0 | Status: SHIPPED | OUTPATIENT
Start: 2024-12-27 | End: 2025-01-15 | Stop reason: SDUPTHER

## 2024-12-27 RX ORDER — ONDANSETRON 4 MG/1
4 TABLET, ORALLY DISINTEGRATING ORAL 3 TIMES DAILY PRN
Qty: 21 TABLET | Refills: 0 | Status: SHIPPED | OUTPATIENT
Start: 2024-12-27 | End: 2025-01-15 | Stop reason: ALTCHOICE

## 2024-12-27 RX ORDER — IOPAMIDOL 755 MG/ML
100 INJECTION, SOLUTION INTRAVASCULAR
Status: COMPLETED | OUTPATIENT
Start: 2024-12-27 | End: 2024-12-27

## 2024-12-27 RX ORDER — POTASSIUM CHLORIDE 1500 MG/1
20 TABLET, EXTENDED RELEASE ORAL DAILY
Qty: 14 TABLET | Refills: 0 | Status: SHIPPED | OUTPATIENT
Start: 2024-12-27 | End: 2025-01-15 | Stop reason: SDUPTHER

## 2024-12-27 RX ORDER — HYOSCYAMINE SULFATE 0.12 MG/1
0.12 TABLET SUBLINGUAL EVERY 6 HOURS PRN
Qty: 30 TABLET | Refills: 0 | Status: SHIPPED | OUTPATIENT
Start: 2024-12-27 | End: 2025-01-15 | Stop reason: ALTCHOICE

## 2024-12-27 RX ORDER — CIPROFLOXACIN 500 MG/1
500 TABLET, FILM COATED ORAL
Status: COMPLETED | OUTPATIENT
Start: 2024-12-27 | End: 2024-12-27

## 2024-12-27 RX ORDER — KETOROLAC TROMETHAMINE 30 MG/ML
15 INJECTION, SOLUTION INTRAMUSCULAR; INTRAVENOUS ONCE
Status: COMPLETED | OUTPATIENT
Start: 2024-12-27 | End: 2024-12-27

## 2024-12-27 RX ADMIN — KETOROLAC TROMETHAMINE 15 MG: 30 INJECTION, SOLUTION INTRAMUSCULAR at 14:03

## 2024-12-27 RX ADMIN — SODIUM CHLORIDE 500 ML: 9 INJECTION, SOLUTION INTRAVENOUS at 14:02

## 2024-12-27 RX ADMIN — ACETAMINOPHEN 1000 MG: 500 TABLET ORAL at 14:03

## 2024-12-27 RX ADMIN — METRONIDAZOLE 500 MG: 500 TABLET ORAL at 16:52

## 2024-12-27 RX ADMIN — IOPAMIDOL 100 ML: 755 INJECTION, SOLUTION INTRAVENOUS at 15:24

## 2024-12-27 RX ADMIN — POTASSIUM BICARBONATE 40 MEQ: 391 TABLET, EFFERVESCENT ORAL at 14:51

## 2024-12-27 RX ADMIN — CIPROFLOXACIN HYDROCHLORIDE 500 MG: 500 TABLET, FILM COATED ORAL at 16:52

## 2024-12-27 ASSESSMENT — PAIN DESCRIPTION - LOCATION: LOCATION: GENERALIZED

## 2024-12-27 ASSESSMENT — PAIN SCALES - GENERAL: PAINLEVEL_OUTOF10: 10

## 2024-12-27 ASSESSMENT — PAIN DESCRIPTION - DESCRIPTORS: DESCRIPTORS: ACHING

## 2024-12-27 ASSESSMENT — PAIN - FUNCTIONAL ASSESSMENT: PAIN_FUNCTIONAL_ASSESSMENT: 0-10

## 2024-12-27 NOTE — ED NOTES
Discharge instructions were given to the patient by Fernando Hawkins RN . The patient left the Emergency Department ambulatory, alert and oriented and in no acute distress with prescriptions. The patient was encouraged to call or return to the ED for worsening issues or problems and was encouraged to schedule a follow up appointment for continuing care. The patient verbalized understanding of discharge instructions and prescriptions, all questions were answered. The patient has no further concerns at this time.

## 2024-12-27 NOTE — ED NOTES
Pt presents to ED complaining of \"vomiting and diarrhea that started 930 last night. I was seen 2 weeks ago for the same thing. My stomach doesn't hurt just sore from me throwing up\". Pt is alert and oriented x 4, RR even and unlabored, skin is warm and dry. Pt appears in NAD at this time. Assessment completed and pt updated on plan of care.  Call bell in reach. No distress noted.    The Nursing Plan of Care is developed from the Nursing assessment and Emergency Department Attending provider initial evaluation.  The plan of care may be reviewed in the “ED Provider note”.    The Plan of Care was developed with the following considerations:  Patient / Family readiness to learn indicated by:verbalized understanding  Persons(s) to be included in education: patient  Barriers to Learning/Limitations:None    Signed    Fernando Hawkins RN    12/27/2024   1:27 PM

## 2024-12-27 NOTE — DISCHARGE INSTRUCTIONS
Follow-up with PCP for reevaluation and coordinate follow-up for results/abnormalities (labs and CT scan) from todays ED visit as we discussed   Rest and drink plenty of fluids   Return precautions as we discussed     Thank You!    It was a pleasure taking care of you in our Emergency Department today. We know that when you come to [facility], you are entrusting us with your health, comfort, and safety. Our clinicians honor that trust, and truly appreciate the opportunity to care for you and your loved ones.    If you receive a survey about your Emergency Department experience today, please fill it out.  We value your feedback. Thank you.      Roselia Sykes PA-C    ___________________________________  I have included a copy of your lab results and/or radiologic studies from today's visit so you can have them easily available at your follow-up visit.   Recent Results (from the past 12 hour(s))   CBC with Auto Differential    Collection Time: 12/27/24  1:41 PM   Result Value Ref Range    WBC 5.1 3.6 - 11.0 K/uL    RBC 4.05 3.80 - 5.20 M/uL    Hemoglobin 12.7 11.5 - 16.0 g/dL    Hematocrit 37.7 35.0 - 47.0 %    MCV 93.1 80.0 - 99.0 FL    MCH 31.4 26.0 - 34.0 PG    MCHC 33.7 30.0 - 36.5 g/dL    RDW 12.3 11.5 - 14.5 %    Platelets 220 150 - 400 K/uL    MPV 10.2 8.9 - 12.9 FL    Nucleated RBCs 0.0 0  WBC    nRBC 0.00 0.00 - 0.01 K/uL    Neutrophils % 94 (H) 32 - 75 %    Lymphocytes % 2 (L) 12 - 49 %    Monocytes % 4 (L) 5 - 13 %    Eosinophils % 0 0 - 7 %    Basophils % 0 0 - 1 %    Immature Granulocytes % 0 0.0 - 0.5 %    Neutrophils Absolute 4.8 1.8 - 8.0 K/UL    Lymphocytes Absolute 0.1 (L) 0.8 - 3.5 K/UL    Monocytes Absolute 0.2 0.0 - 1.0 K/UL    Eosinophils Absolute 0.0 0.0 - 0.4 K/UL    Basophils Absolute 0.0 0.0 - 0.1 K/UL    Immature Granulocytes Absolute 0.0 0.00 - 0.04 K/UL    Differential Type SMEAR SCANNED      RBC Comment NORMOCYTIC, NORMOCHROMIC     Comprehensive  Metabolic Panel    Collection Time: 12/27/24  1:41 PM   Result Value Ref Range    Sodium 139 136 - 145 mmol/L    Potassium 2.9 (L) 3.5 - 5.1 mmol/L    Chloride 99 97 - 108 mmol/L    CO2 30 21 - 32 mmol/L    Anion Gap 10 2 - 12 mmol/L    Glucose 131 (H) 65 - 100 mg/dL    BUN 21 (H) 6 - 20 MG/DL    Creatinine 0.76 0.55 - 1.02 MG/DL    BUN/Creatinine Ratio 28 (H) 12 - 20      Est, Glom Filt Rate 80 >60 ml/min/1.73m2    Calcium 8.8 8.5 - 10.1 MG/DL    Total Bilirubin 1.0 0.2 - 1.0 MG/DL    ALT 21 12 - 78 U/L    AST 18 15 - 37 U/L    Alk Phosphatase 74 45 - 117 U/L    Total Protein 7.5 6.4 - 8.2 g/dL    Albumin 3.7 3.5 - 5.0 g/dL    Globulin 3.8 2.0 - 4.0 g/dL    Albumin/Globulin Ratio 1.0 (L) 1.1 - 2.2     Lipase    Collection Time: 12/27/24  1:41 PM   Result Value Ref Range    Lipase 14 13 - 75 U/L   Urinalysis with Reflex to Culture    Collection Time: 12/27/24  1:41 PM    Specimen: Urine   Result Value Ref Range    Color, UA DARK YELLOW      Appearance CLOUDY (A) CLEAR      Specific Gravity, UA 1.030 1.003 - 1.030      pH, Urine 5.5 5.0 - 8.0      Protein, UA TRACE (A) NEG mg/dL    Glucose, Ur Negative NEG mg/dL    Ketones, Urine TRACE (A) NEG mg/dL    Bilirubin, Urine Negative NEG      Blood, Urine Negative NEG      Urobilinogen, Urine 1.0 0.2 - 1.0 EU/dL    Nitrite, Urine Positive (A) NEG      Leukocyte Esterase, Urine TRACE (A) NEG      WBC, UA 5-10 0 - 4 /hpf    RBC, UA 0-5 0 - 5 /hpf    Epithelial Cells, UA MODERATE (A) FEW /lpf    BACTERIA, URINE 2+ (A) NEG /hpf    Urine Culture if Indicated CULTURE NOT INDICATED BY UA RESULT CNI     COVID-19 & Influenza Combo    Collection Time: 12/27/24  1:41 PM    Specimen: Nasopharyngeal   Result Value Ref Range    Source Nasopharyngeal      SARS-CoV-2, PCR Not detected NOTD      Rapid Influenza A By PCR Not detected NOTD      Rapid Influenza B By PCR Not detected NOTD     Troponin    Collection Time: 12/27/24  1:41 PM   Result Value Ref Range    Troponin, High Sensitivity

## 2024-12-27 NOTE — ED PROVIDER NOTES
[facility] EMERGENCY DEPT  EMERGENCY DEPARTMENT ENCOUNTER       Pt Name: Gem Nicole  MRN: [id number]  Birthdate 1945  Date of evaluation: 12/27/2024  Provider: RAINER Smith   PCP: Pema Angel MD  Note Started: 1:26 PM EST 12/27/24     CHIEF COMPLAINT       Chief Complaint   Patient presents with    Vomiting    Diarrhea    Generalized Body Aches        HISTORY OF PRESENT ILLNESS: 1 or more elements      History From: Patient  None     Gem Nicole is a 79 y.o. female with PMHx HTN, arthritis, DMII and additional history as noted below, who presents to the ED for evaluation of nausea, vomiting, diarrhea and generalized body aches after eating a chicken pot pie.  Patient endorses nausea and had multiple episodes of nonbilious, nonbloody vomiting since last night.  States she is also having multiple episodes of loose stools.  Denies dark tarry or bright red bloody stools.  States symptoms began shortly after eating a store-bought chicken pot pie.  Endorses generalized abdominal discomfort states \"it does not hurt as much as it is I am sore from throwing up so much\".  Endorses chills and generalized bodyaches, denies measured fevers.  States she has had a hard time keeping anything down.  Denies chest pain, shortness of breath, dysuria, hematuria, blood in urine or stool.  Denies any known sick contacts.       Nursing Notes were all reviewed and agreed with or any disagreements were addressed in the HPI.     REVIEW OF SYSTEMS      Review of Systems   All other systems reviewed and are negative.       Positives and Pertinent negatives as per HPI.    PAST HISTORY     Past Medical History:  Past Medical History:   Diagnosis Date    Arthritis     knee    Breast cancer (HCC)     Cancer (HCC) 2003    melanoma -right lower extremity    Chronic pain     left knee & left shoulder    Diabetes (HCC)     type 2    Hypertension     Nausea & vomiting     Screen for colon cancer 10/2/2014       Past Surgical  (A) NEG      WBC, UA 5-10 0 - 4 /hpf    RBC, UA 0-5 0 - 5 /hpf    Epithelial Cells, UA MODERATE (A) FEW /lpf    BACTERIA, URINE 2+ (A) NEG /hpf    Urine Culture if Indicated CULTURE NOT INDICATED BY UA RESULT CNI     COVID-19 & Influenza Combo    Collection Time: 12/27/24  1:41 PM    Specimen: Nasopharyngeal   Result Value Ref Range    Source Nasopharyngeal      SARS-CoV-2, PCR Not detected NOTD      Rapid Influenza A By PCR Not detected NOTD      Rapid Influenza B By PCR Not detected NOTD     Troponin    Collection Time: 12/27/24  1:41 PM   Result Value Ref Range    Troponin, High Sensitivity 80 (H) 0 - 51 ng/L   EKG 12 Lead    Collection Time: 12/27/24  1:57 PM   Result Value Ref Range    Ventricular Rate 82 BPM    Atrial Rate 82 BPM    P-R Interval 146 ms    QRS Duration 82 ms    Q-T Interval 362 ms    QTc Calculation (Bazett) 422 ms    P Axis 58 degrees    R Axis 30 degrees    T Axis 147 degrees    Diagnosis       Normal sinus rhythm  Nonspecific ST and T wave abnormality  Abnormal ECG  When compared with ECG of 11-AUG-2021 08:12,  Nonspecific T wave abnormality, worse in Inferior leads  and lateral leads  Confirmed by SHANTI Tejada Charles ([id number]) on 12/27/2024 5:49:39 PM     Troponin    Collection Time: 12/27/24  2:42 PM   Result Value Ref Range    Troponin, High Sensitivity 67 (H) 0 - 51 ng/L       EKG: When ordered, EKG's are interpreted by the Emergency Department Physician in the absence of a cardiologist.  Please see their note for interpretation of EKG.      RADIOLOGY:  Non-plain film images such as CT, Ultrasound and MRI are read by the radiologist. Plain radiographic images are visualized and preliminarily interpreted by the ED Provider with the below findings:          Interpretation per the Radiologist below, if available at the time of this note:     CT ABDOMEN PELVIS W IV CONTRAST Additional Contrast? None   Final Result   Circumferential narrowing of a segment of descending colon. Consider annular

## 2024-12-27 NOTE — ED TRIAGE NOTES
Pt presents with generalized body aches, N/V/D x 1 day after eating a store-bought chicken pot pie.

## 2024-12-29 LAB
BACTERIA SPEC CULT: ABNORMAL
CC UR VC: ABNORMAL
SERVICE CMNT-IMP: ABNORMAL

## 2025-01-15 ENCOUNTER — OFFICE VISIT (OUTPATIENT)
Age: 80
End: 2025-01-15
Payer: MEDICARE

## 2025-01-15 VITALS
DIASTOLIC BLOOD PRESSURE: 68 MMHG | RESPIRATION RATE: 20 BRPM | SYSTOLIC BLOOD PRESSURE: 128 MMHG | HEART RATE: 69 BPM | WEIGHT: 153.4 LBS | TEMPERATURE: 98.7 F | OXYGEN SATURATION: 99 % | BODY MASS INDEX: 27.17 KG/M2

## 2025-01-15 DIAGNOSIS — F43.23 ADJUSTMENT DISORDER WITH MIXED ANXIETY AND DEPRESSED MOOD: ICD-10-CM

## 2025-01-15 DIAGNOSIS — Z79.899 ENCOUNTER FOR LONG-TERM (CURRENT) USE OF MEDICATIONS: ICD-10-CM

## 2025-01-15 DIAGNOSIS — I10 ESSENTIAL (PRIMARY) HYPERTENSION: Primary | ICD-10-CM

## 2025-01-15 DIAGNOSIS — E87.6 HYPOKALEMIA: ICD-10-CM

## 2025-01-15 DIAGNOSIS — Z63.79 STRESS DUE TO ILLNESS OF FAMILY MEMBER: ICD-10-CM

## 2025-01-15 DIAGNOSIS — J30.89 ENVIRONMENTAL AND SEASONAL ALLERGIES: ICD-10-CM

## 2025-01-15 DIAGNOSIS — E11.42 TYPE 2 DIABETES MELLITUS WITH DIABETIC POLYNEUROPATHY, WITHOUT LONG-TERM CURRENT USE OF INSULIN (HCC): ICD-10-CM

## 2025-01-15 DIAGNOSIS — E78.2 MIXED HYPERLIPIDEMIA: ICD-10-CM

## 2025-01-15 PROCEDURE — 99214 OFFICE O/P EST MOD 30 MIN: CPT | Performed by: FAMILY MEDICINE

## 2025-01-15 RX ORDER — POTASSIUM CHLORIDE 1500 MG/1
20 TABLET, EXTENDED RELEASE ORAL DAILY
Qty: 90 TABLET | Refills: 3 | Status: SHIPPED | OUTPATIENT
Start: 2025-01-15

## 2025-01-15 RX ORDER — FLUTICASONE PROPIONATE 50 MCG
2 SPRAY, SUSPENSION (ML) NASAL DAILY PRN
Qty: 16 G | Refills: 1 | Status: SHIPPED | OUTPATIENT
Start: 2025-01-15

## 2025-01-15 SDOH — ECONOMIC STABILITY: FOOD INSECURITY: WITHIN THE PAST 12 MONTHS, YOU WORRIED THAT YOUR FOOD WOULD RUN OUT BEFORE YOU GOT MONEY TO BUY MORE.: NEVER TRUE

## 2025-01-15 SDOH — ECONOMIC STABILITY: FOOD INSECURITY: WITHIN THE PAST 12 MONTHS, THE FOOD YOU BOUGHT JUST DIDN'T LAST AND YOU DIDN'T HAVE MONEY TO GET MORE.: NEVER TRUE

## 2025-01-15 ASSESSMENT — PATIENT HEALTH QUESTIONNAIRE - PHQ9
9. THOUGHTS THAT YOU WOULD BE BETTER OFF DEAD, OR OF HURTING YOURSELF: NOT AT ALL
4. FEELING TIRED OR HAVING LITTLE ENERGY: SEVERAL DAYS
SUM OF ALL RESPONSES TO PHQ QUESTIONS 1-9: 6
10. IF YOU CHECKED OFF ANY PROBLEMS, HOW DIFFICULT HAVE THESE PROBLEMS MADE IT FOR YOU TO DO YOUR WORK, TAKE CARE OF THINGS AT HOME, OR GET ALONG WITH OTHER PEOPLE: NOT DIFFICULT AT ALL
SUM OF ALL RESPONSES TO PHQ9 QUESTIONS 1 & 2: 3
8. MOVING OR SPEAKING SO SLOWLY THAT OTHER PEOPLE COULD HAVE NOTICED. OR THE OPPOSITE, BEING SO FIGETY OR RESTLESS THAT YOU HAVE BEEN MOVING AROUND A LOT MORE THAN USUAL: NOT AT ALL
3. TROUBLE FALLING OR STAYING ASLEEP: NOT AT ALL
SUM OF ALL RESPONSES TO PHQ QUESTIONS 1-9: 6
7. TROUBLE CONCENTRATING ON THINGS, SUCH AS READING THE NEWSPAPER OR WATCHING TELEVISION: NOT AT ALL
2. FEELING DOWN, DEPRESSED OR HOPELESS: NEARLY EVERY DAY
6. FEELING BAD ABOUT YOURSELF - OR THAT YOU ARE A FAILURE OR HAVE LET YOURSELF OR YOUR FAMILY DOWN: SEVERAL DAYS
5. POOR APPETITE OR OVEREATING: SEVERAL DAYS
1. LITTLE INTEREST OR PLEASURE IN DOING THINGS: NOT AT ALL
SUM OF ALL RESPONSES TO PHQ QUESTIONS 1-9: 6
SUM OF ALL RESPONSES TO PHQ QUESTIONS 1-9: 6

## 2025-01-15 ASSESSMENT — ENCOUNTER SYMPTOMS
RHINORRHEA: 1
COUGH: 0
BLOOD IN STOOL: 0
ABDOMINAL PAIN: 0
SHORTNESS OF BREATH: 0
CONSTIPATION: 0
BACK PAIN: 0
CHEST TIGHTNESS: 0

## 2025-01-15 NOTE — PROGRESS NOTES
Chief Complaint   Patient presents with    ED Follow-up     Patient is here today for ED follow up.       \"Have you been to the ER, urgent care clinic since your last visit?  Hospitalized since your last visit?\"    YES - When: approximately 2  weeks ago.  Where and Why: [facility] for vomiting, diarrhea, and body aches.    “Have you seen or consulted any other health care providers outside of [facility] since your last visit?”    NO            Click Here for Release of Records Request       There were no vitals filed for this visit.   Health Maintenance Due   Topic Date Due    Respiratory Syncytial Virus (RSV) Pregnant or age 60 yrs+ (1 - 1-dose 75+ series) Never done    Diabetic retinal exam  10/03/2024    COVID-19 Vaccine (2023- season) 2024    Annual Wellness Visit (Medicare Advantage)  2025        The patient, Gem Nicole, identity was verified by name and .       
status:      Spouse name: None    Number of children: None    Years of education: None    Highest education level: None   Tobacco Use    Smoking status: Never    Smokeless tobacco: Never   Vaping Use    Vaping status: Never Used   Substance and Sexual Activity    Alcohol use: Not Currently    Drug use: Not Currently     Types: Marijuana (Weed)    Sexual activity: Yes     Birth control/protection: Surgical, None     Social Determinants of Health     Financial Resource Strain: Low Risk  (11/8/2024)    Overall Financial Resource Strain (CARDIA)     Difficulty of Paying Living Expenses: Not very hard   Food Insecurity: No Food Insecurity (1/15/2025)    Hunger Vital Sign     Worried About Running Out of Food in the Last Year: Never true     Ran Out of Food in the Last Year: Never true   Transportation Needs: No Transportation Needs (1/15/2025)    PRAPARE - Transportation     Lack of Transportation (Medical): No     Lack of Transportation (Non-Medical): No   Physical Activity: Sufficiently Active (11/8/2024)    Exercise Vital Sign     Days of Exercise per Week: 4 days     Minutes of Exercise per Session: 40 min   Stress: Stress Concern Present (4/17/2024)    [employer] - Occupational Stress Questionnaire     Feeling of Stress : To some extent   Social Connections: Socially Integrated (4/17/2024)    Social Connection and Isolation Panel [NHANES]     Frequency of Communication with Friends and Family: Twice a week     Frequency of Social Gatherings with Friends and Family: Once a week     Attends Oriental orthodox Services: 1 to 4 times per year     Active Member of Clubs or Organizations: No     Attends Club or Organization Meetings: 1 to 4 times per year     Marital Status:    Intimate Partner Violence: Not At Risk (4/17/2024)    Humiliation, Afraid, Rape, and Kick questionnaire     Fear of Current or Ex-Partner: No     Emotionally Abused: No     Physically Abused: No     Sexually

## 2025-01-16 LAB — POTASSIUM SERPL-SCNC: 3.6 MMOL/L (ref 3.5–5.1)

## 2025-01-21 ENCOUNTER — TRANSCRIBE ORDERS (OUTPATIENT)
Facility: HOSPITAL | Age: 80
End: 2025-01-21

## 2025-01-21 DIAGNOSIS — Z12.31 VISIT FOR SCREENING MAMMOGRAM: Primary | ICD-10-CM

## 2025-02-24 RX ORDER — LOSARTAN POTASSIUM 100 MG/1
TABLET ORAL
Qty: 90 TABLET | Refills: 3 | Status: SHIPPED | OUTPATIENT
Start: 2025-02-24

## 2025-02-24 NOTE — TELEPHONE ENCOUNTER
Last appointment: 1/15/25  Next appointment: 3/17/25  Previous refill encounter(s): 3/6/24    Requested Prescriptions     Pending Prescriptions Disp Refills    losartan (COZAAR) 100 MG tablet [Pharmacy Med Name: LOSARTAN 100MG TABLETS] 90 tablet 3     Sig: TAKE 1 TABLET BY MOUTH DAILY         For Pharmacy Admin Tracking Only    Program: Medication Refill  CPA in place:    Recommendation Provided To:   Intervention Detail: New Rx: 1, reason: Patient Preference  Intervention Accepted By:   Gap Closed?:    Time Spent (min): 5

## 2025-03-12 DIAGNOSIS — J30.89 ENVIRONMENTAL AND SEASONAL ALLERGIES: ICD-10-CM

## 2025-03-12 RX ORDER — FLUTICASONE PROPIONATE 50 MCG
SPRAY, SUSPENSION (ML) NASAL
Qty: 16 G | Refills: 5 | Status: SHIPPED | OUTPATIENT
Start: 2025-03-12

## 2025-03-12 NOTE — TELEPHONE ENCOUNTER
Last appointment: 1/15/25  Next appointment: 3/17/25  Previous refill encounter(s): 1/15/25 #1 with 1 refill    Requested Prescriptions     Pending Prescriptions Disp Refills    fluticasone (FLONASE) 50 MCG/ACT nasal spray [Pharmacy Med Name: FLUTICASONE 50MCG NASAL SP (120) RX] 16 g 5     Sig: SHAKE LIQUID AND USE 2 SPRAYS IN EACH NOSTRIL DAILY AS NEEDED FOR RHINITIS         For Pharmacy Admin Tracking Only    Program: Medication Refill  CPA in place:    Recommendation Provided To:   Intervention Detail: New Rx: 1, reason: Patient Preference  Intervention Accepted By:   Gap Closed?:    Time Spent (min): 5

## 2025-03-17 ENCOUNTER — OFFICE VISIT (OUTPATIENT)
Age: 80
End: 2025-03-17
Payer: MEDICARE

## 2025-03-17 VITALS
HEART RATE: 61 BPM | SYSTOLIC BLOOD PRESSURE: 123 MMHG | DIASTOLIC BLOOD PRESSURE: 57 MMHG | TEMPERATURE: 98 F | BODY MASS INDEX: 27.17 KG/M2 | RESPIRATION RATE: 18 BRPM | WEIGHT: 153.4 LBS | OXYGEN SATURATION: 97 %

## 2025-03-17 DIAGNOSIS — E11.42 TYPE 2 DIABETES MELLITUS WITH DIABETIC POLYNEUROPATHY, WITHOUT LONG-TERM CURRENT USE OF INSULIN (HCC): ICD-10-CM

## 2025-03-17 DIAGNOSIS — Z79.899 ENCOUNTER FOR LONG-TERM (CURRENT) USE OF MEDICATIONS: ICD-10-CM

## 2025-03-17 DIAGNOSIS — I10 ESSENTIAL (PRIMARY) HYPERTENSION: Primary | ICD-10-CM

## 2025-03-17 DIAGNOSIS — Z63.79 STRESS DUE TO ILLNESS OF FAMILY MEMBER: ICD-10-CM

## 2025-03-17 DIAGNOSIS — E78.2 MIXED HYPERLIPIDEMIA: ICD-10-CM

## 2025-03-17 LAB
ALBUMIN SERPL-MCNC: 3.9 G/DL (ref 3.5–5)
ALBUMIN/GLOB SERPL: 1.1 (ref 1.1–2.2)
ALP SERPL-CCNC: 76 U/L (ref 45–117)
ALT SERPL-CCNC: 15 U/L (ref 12–78)
ANION GAP SERPL CALC-SCNC: 9 MMOL/L (ref 2–12)
AST SERPL-CCNC: 14 U/L (ref 15–37)
BILIRUB SERPL-MCNC: 0.6 MG/DL (ref 0.2–1)
BUN SERPL-MCNC: 15 MG/DL (ref 6–20)
BUN/CREAT SERPL: 31 (ref 12–20)
CALCIUM SERPL-MCNC: 10 MG/DL (ref 8.5–10.1)
CHLORIDE SERPL-SCNC: 103 MMOL/L (ref 97–108)
CHOLEST SERPL-MCNC: 199 MG/DL
CO2 SERPL-SCNC: 27 MMOL/L (ref 21–32)
CREAT SERPL-MCNC: 0.48 MG/DL (ref 0.55–1.02)
GLOBULIN SER CALC-MCNC: 3.5 G/DL (ref 2–4)
GLUCOSE SERPL-MCNC: 103 MG/DL (ref 65–100)
GLUCOSE, POC: 120 MG/DL
HBA1C MFR BLD: 5.9 %
HDLC SERPL-MCNC: 89 MG/DL
HDLC SERPL: 2.2 (ref 0–5)
LDLC SERPL CALC-MCNC: 98.6 MG/DL (ref 0–100)
POTASSIUM SERPL-SCNC: 4 MMOL/L (ref 3.5–5.1)
PROT SERPL-MCNC: 7.4 G/DL (ref 6.4–8.2)
SODIUM SERPL-SCNC: 139 MMOL/L (ref 136–145)
TRIGL SERPL-MCNC: 57 MG/DL
VLDLC SERPL CALC-MCNC: 11.4 MG/DL

## 2025-03-17 PROCEDURE — 1036F TOBACCO NON-USER: CPT | Performed by: FAMILY MEDICINE

## 2025-03-17 PROCEDURE — PBSHW AMB POC GLUCOSE BLOOD, BY GLUCOSE MONITORING DEVICE: Performed by: FAMILY MEDICINE

## 2025-03-17 PROCEDURE — G2211 COMPLEX E/M VISIT ADD ON: HCPCS | Performed by: FAMILY MEDICINE

## 2025-03-17 PROCEDURE — 3074F SYST BP LT 130 MM HG: CPT | Performed by: FAMILY MEDICINE

## 2025-03-17 PROCEDURE — 1123F ACP DISCUSS/DSCN MKR DOCD: CPT | Performed by: FAMILY MEDICINE

## 2025-03-17 PROCEDURE — 99214 OFFICE O/P EST MOD 30 MIN: CPT | Performed by: FAMILY MEDICINE

## 2025-03-17 PROCEDURE — 3044F HG A1C LEVEL LT 7.0%: CPT | Performed by: FAMILY MEDICINE

## 2025-03-17 PROCEDURE — G8417 CALC BMI ABV UP PARAM F/U: HCPCS | Performed by: FAMILY MEDICINE

## 2025-03-17 PROCEDURE — 82962 GLUCOSE BLOOD TEST: CPT | Performed by: FAMILY MEDICINE

## 2025-03-17 PROCEDURE — G8427 DOCREV CUR MEDS BY ELIG CLIN: HCPCS | Performed by: FAMILY MEDICINE

## 2025-03-17 PROCEDURE — 83036 HEMOGLOBIN GLYCOSYLATED A1C: CPT | Performed by: FAMILY MEDICINE

## 2025-03-17 PROCEDURE — 1126F AMNT PAIN NOTED NONE PRSNT: CPT | Performed by: FAMILY MEDICINE

## 2025-03-17 PROCEDURE — 3078F DIAST BP <80 MM HG: CPT | Performed by: FAMILY MEDICINE

## 2025-03-17 PROCEDURE — PBSHW AMB POC HEMOGLOBIN A1C: Performed by: FAMILY MEDICINE

## 2025-03-17 PROCEDURE — 1159F MED LIST DOCD IN RCRD: CPT | Performed by: FAMILY MEDICINE

## 2025-03-17 PROCEDURE — 1090F PRES/ABSN URINE INCON ASSESS: CPT | Performed by: FAMILY MEDICINE

## 2025-03-17 PROCEDURE — G8399 PT W/DXA RESULTS DOCUMENT: HCPCS | Performed by: FAMILY MEDICINE

## 2025-03-17 PROCEDURE — 1160F RVW MEDS BY RX/DR IN RCRD: CPT | Performed by: FAMILY MEDICINE

## 2025-03-17 ASSESSMENT — PATIENT HEALTH QUESTIONNAIRE - PHQ9
2. FEELING DOWN, DEPRESSED OR HOPELESS: SEVERAL DAYS
SUM OF ALL RESPONSES TO PHQ QUESTIONS 1-9: 1
SUM OF ALL RESPONSES TO PHQ QUESTIONS 1-9: 1
1. LITTLE INTEREST OR PLEASURE IN DOING THINGS: NOT AT ALL
SUM OF ALL RESPONSES TO PHQ QUESTIONS 1-9: 1
SUM OF ALL RESPONSES TO PHQ QUESTIONS 1-9: 1

## 2025-03-17 ASSESSMENT — ENCOUNTER SYMPTOMS
COUGH: 0
CONSTIPATION: 0
BLOOD IN STOOL: 0
CHEST TIGHTNESS: 0
RHINORRHEA: 0
ABDOMINAL PAIN: 0
SHORTNESS OF BREATH: 0

## 2025-03-17 NOTE — PROGRESS NOTES
Chief Complaint   Patient presents with    2 month follow up     Patient is here today for 2 month follow up for HTN.    Hypertension       \"Have you been to the ER, urgent care clinic since your last visit?  Hospitalized since your last visit?\"    NO    “Have you seen or consulted any other health care providers outside of [facility] since your last visit?”    NO            Click Here for Release of Records Request       There were no vitals filed for this visit.   Health Maintenance Due   Topic Date Due    Respiratory Syncytial Virus (RSV) Pregnant or age 60 yrs+ (1 - 1-dose 75+ series) Never done    Diabetic retinal exam  10/03/2024    COVID-19 Vaccine ( season) 2024    Annual Wellness Visit (Medicare Advantage)  2025        The patient, Gem Nicole, identity was verified by name and .

## 2025-03-17 NOTE — PROGRESS NOTES
Subjective:      Patient ID: Gem Nicole is a 79 y.o. female.    HPI  Follow up on chronic medical problems.  Overall doing well.  Handling her stress betters since her  is now in and long term facility as per [facility].     HTN follow up:  Compliant w/ meds, low salt diet, and exercise.    No swelling, headache or dizziness.  No chest pain, SOB, palpitations.  Otherwise feeling well since the last visit.  DM type II follow up:  Compliant w/ meds, diabetic diet, and exercise.  Obtains home glucose monitoring averaging 100-140s.  No hypoglycemia.  Checks BS BID on most days and prn.  Pt does not have BS log at visit today.   No Rf needed for today.    Tingling sensation in the foot and toes which comes and goes.  Denies polyuria and polydipsia.  No blurred vision.  No significant weight changes.    Hypercholesterolemia follow up:  Compliant w/ meds, low fat, low cholesterol diet.  Stopped crestor and pravastatin d/t muscle aching.  Exercising some.  Patient fasting today.  HM:   Mammogram 11/21/24   Colonoscopy 3/4/2016 by Dr. Joyner- repeat in 10 years.    Past Medical History:   Diagnosis Date    Arthritis     knee    Breast cancer (HCC)     Cancer (HCC) 2003    melanoma -right lower extremity    Chronic pain     left knee & left shoulder    Diabetes (HCC)     type 2    Hypertension     Nausea & vomiting     Screen for colon cancer 10/2/2014     Past Surgical History:   Procedure Laterality Date    BREAST BIOPSY Right     yrs ago    BREAST BIOPSY Left 06/2021    BREAST LUMPECTOMY Left 8/18/2021    LEFT BREAST LUMPECTOMY WITH NEEDLE LOCALIZATION (1100) performed by Aly Katz Jr., MD at [facility] AMBULATORY OR    CATARACT REMOVAL  2010    bilateral eyes    CHOLECYSTECTOMY      COLONOSCOPY FLX DX W/COLLJ SPEC WHEN PFRMD  2007    date and md unknown    HYSTERECTOMY (CERVIX STATUS UNKNOWN)  1990    fibroid    KNEE ARTHROSCOPY  1999    left knee replacement    YESENIA STEREO BREAST BX W LOC DEVICE 1ST LESION LEFT

## 2025-03-18 ENCOUNTER — TELEPHONE (OUTPATIENT)
Age: 80
End: 2025-03-18

## 2025-03-18 NOTE — TELEPHONE ENCOUNTER
----- Message from Dr. Pema Angel MD sent at 3/17/2025 11:20 AM EDT -----  Please send for eye exam from Dr. Malcolm

## 2025-03-20 ENCOUNTER — RESULTS FOLLOW-UP (OUTPATIENT)
Age: 80
End: 2025-03-20

## 2025-04-28 ENCOUNTER — APPOINTMENT (OUTPATIENT)
Facility: HOSPITAL | Age: 80
End: 2025-04-28
Payer: MEDICARE

## 2025-04-28 ENCOUNTER — HOSPITAL ENCOUNTER (EMERGENCY)
Facility: HOSPITAL | Age: 80
Discharge: HOME | End: 2025-04-28
Payer: MEDICARE

## 2025-04-28 VITALS
BODY MASS INDEX: 27.1 KG/M2 | HEART RATE: 62 BPM | RESPIRATION RATE: 18 BRPM | TEMPERATURE: 98.4 F | WEIGHT: 153 LBS | DIASTOLIC BLOOD PRESSURE: 68 MMHG | SYSTOLIC BLOOD PRESSURE: 133 MMHG | OXYGEN SATURATION: 100 %

## 2025-04-28 DIAGNOSIS — M25.511 PAIN IN JOINT OF RIGHT SHOULDER: Primary | ICD-10-CM

## 2025-04-28 PROCEDURE — 99284 EMERGENCY DEPT VISIT MOD MDM: CPT

## 2025-04-28 PROCEDURE — 96372 THER/PROPH/DIAG INJ SC/IM: CPT

## 2025-04-28 PROCEDURE — 73030 X-RAY EXAM OF SHOULDER: CPT

## 2025-04-28 PROCEDURE — 6360000002 HC RX W HCPCS: Performed by: PHYSICIAN ASSISTANT

## 2025-04-28 RX ORDER — MELOXICAM 7.5 MG/1
7.5 TABLET ORAL DAILY PRN
Qty: 30 TABLET | Refills: 0 | Status: SHIPPED | OUTPATIENT
Start: 2025-04-28

## 2025-04-28 RX ORDER — KETOROLAC TROMETHAMINE 30 MG/ML
15 INJECTION, SOLUTION INTRAMUSCULAR; INTRAVENOUS ONCE
Status: COMPLETED | OUTPATIENT
Start: 2025-04-28 | End: 2025-04-28

## 2025-04-28 RX ADMIN — KETOROLAC TROMETHAMINE 15 MG: 30 INJECTION, SOLUTION INTRAMUSCULAR at 17:30

## 2025-04-28 ASSESSMENT — PAIN DESCRIPTION - ORIENTATION: ORIENTATION: RIGHT

## 2025-04-28 ASSESSMENT — PAIN DESCRIPTION - DESCRIPTORS
DESCRIPTORS: ACHING
DESCRIPTORS: THROBBING

## 2025-04-28 ASSESSMENT — PAIN SCALES - GENERAL
PAINLEVEL_OUTOF10: 10
PAINLEVEL_OUTOF10: 10

## 2025-04-28 ASSESSMENT — PAIN DESCRIPTION - PAIN TYPE: TYPE: CHRONIC PAIN

## 2025-04-28 ASSESSMENT — PAIN DESCRIPTION - LOCATION
LOCATION: SHOULDER
LOCATION: SHOULDER

## 2025-04-28 ASSESSMENT — PAIN - FUNCTIONAL ASSESSMENT: PAIN_FUNCTIONAL_ASSESSMENT: 0-10

## 2025-04-28 NOTE — DISCHARGE INSTRUCTIONS
It was a pleasure taking care of you at [facility] today.      We know that when you come to [facility], you are entrusting us with your health, comfort, and safety.  Our physicians and nurses honor that trust, and we appreciate the opportunity to care for you and your loved ones.  We also value your feedback, and we would like to hear from you.    When you receive a  >>> survey <<< about your Emergency Department experience today.   Please fill it out and consider giving us all 5's if you had a good experience. We review every single response from our patients. Thank you!

## 2025-04-28 NOTE — ED PROVIDER NOTES
[facility] EMERGENCY DEPARTMENT  EMERGENCY DEPARTMENT ENCOUNTER         Pt Name: Gem Nicole  MRN: [id number]  Birthdate 1945  Date of evaluation: 4/28/2025  Provider: Olivia Stephens PA-C   PCP: Pema Angel MD  Note Started: 4:59 PM EDT 4/28/25     CHIEF COMPLAINT       Chief Complaint   Patient presents with    Shoulder Pain        HISTORY OF PRESENT ILLNESS: 1 or more elements      History From: Patient  HPI Limitations: None  Arrival Mode: Private vehicle     Gem Nicole is a 80 y.o. female who presents to the ED with chronic right shoulder pain for over a year.  Patient states she just has not been taking care of herself but within the last couple of weeks she states the pain is worsening starting to radiate to the neck and upper back.  She denies any recent injury or trauma.  She is been taking over-the-counter meds with minimal relief.  She rates discomfort 10 out of 10.      Nursing Notes were all reviewed and agreed with or any disagreements were addressed in the HPI.  Please see MDM for additional details of HPI and ROS     REVIEW OF SYSTEMS      Review of Systems     Positives and Pertinent negatives as per HPI.    PAST HISTORY     Past Medical History:  Past Medical History:   Diagnosis Date    Arthritis     knee    Breast cancer (HCC)     Cancer (HCC) 2003    melanoma -right lower extremity    Chronic pain     left knee & left shoulder    Diabetes (HCC)     type 2    Hypertension     Nausea & vomiting     Screen for colon cancer 10/2/2014       Past Surgical History:  Past Surgical History:   Procedure Laterality Date    BREAST BIOPSY Right     yrs ago    BREAST BIOPSY Left 06/2021    BREAST LUMPECTOMY Left 8/18/2021    LEFT BREAST LUMPECTOMY WITH NEEDLE LOCALIZATION (1100) performed by Aly Katz Jr., MD at [facility] AMBULATORY OR    CATARACT REMOVAL  2010    bilateral eyes    CHOLECYSTECTOMY      COLONOSCOPY FLX DX W/COLLJ SPEC WHEN PFRMD  2007    date and md

## 2025-04-28 NOTE — ED TRIAGE NOTES
Pt c/o RIGHT shoulder pain that started a year ago after hitting it on a wall. Pt states pain has increased over time and is now radiating to neck and upper back with movement. Pt taking OTC pain medications with no relief.

## 2025-04-28 NOTE — ED NOTES
Discharge instructions were given to the patient by DANIEL Barakat.     The patient left the Emergency Department alert and oriented and in no acute distress with 1 prescriptions. The patient was encouraged to call or return to the ED for worsening issues or problems and was encouraged to schedule a follow up appointment for continuing care.     Ambulation assessment completed before discharge.  Pt left Emergency Department at expected ambulatory status with no ortho devices  Ortho device education: none    The patient verbalized understanding of discharge instructions and prescriptions, all questions were answered. The patient has no further concerns at this time.

## 2025-05-01 ENCOUNTER — TELEPHONE (OUTPATIENT)
Age: 80
End: 2025-05-01

## 2025-05-01 DIAGNOSIS — M25.511 CHRONIC RIGHT SHOULDER PAIN: Primary | ICD-10-CM

## 2025-05-01 DIAGNOSIS — G89.29 CHRONIC RIGHT SHOULDER PAIN: Primary | ICD-10-CM

## 2025-05-01 RX ORDER — TRAMADOL HYDROCHLORIDE 50 MG/1
50 TABLET ORAL EVERY 6 HOURS PRN
Qty: 28 TABLET | Refills: 0 | Status: SHIPPED | OUTPATIENT
Start: 2025-05-01 | End: 2025-05-08

## 2025-05-01 NOTE — TELEPHONE ENCOUNTER
Spoke to the pt and she went to the ER for shoulder pain and was given meloxicam and it is not working.  Pt wants to get something stronger for the pain because the meloxicam is not working.  [phone]

## 2025-05-01 NOTE — TELEPHONE ENCOUNTER
Pt was in the ED this past Monday for severe right shoulder pain. She said that the medication they prescribed her for pain is not helping and she is wanting to get a call back at [phone] to discuss her pain.

## 2025-05-02 DIAGNOSIS — I10 ESSENTIAL (PRIMARY) HYPERTENSION: ICD-10-CM

## 2025-05-02 RX ORDER — AMLODIPINE BESYLATE 5 MG/1
10 TABLET ORAL DAILY
Qty: 60 TABLET | Refills: 0 | Status: SHIPPED | OUTPATIENT
Start: 2025-05-02

## 2025-05-02 NOTE — TELEPHONE ENCOUNTER
Last appointment: 3/17/25  Next appointment: 9/19/25  Previous refill encounter(s): 5/9/24    Requested Prescriptions     Pending Prescriptions Disp Refills    amLODIPine (NORVASC) 5 MG tablet [Pharmacy Med Name: AMLODIPINE BESYLATE 5MG TABLETS] 60 tablet 0     Sig: TAKE 2 TABLETS BY MOUTH DAILY         For Pharmacy Admin Tracking Only    Program: Medication Refill  CPA in place:    Recommendation Provided To:   Intervention Detail: New Rx: 1, reason: Patient Preference  Intervention Accepted By:   Gap Closed?:    Time Spent (min): 5

## 2025-06-08 DIAGNOSIS — I10 ESSENTIAL (PRIMARY) HYPERTENSION: ICD-10-CM

## 2025-06-09 RX ORDER — AMLODIPINE BESYLATE 5 MG/1
10 TABLET ORAL DAILY
Qty: 180 TABLET | Refills: 3 | Status: SHIPPED | OUTPATIENT
Start: 2025-06-09

## 2025-06-09 NOTE — TELEPHONE ENCOUNTER
Last appointment: 3/17/25  Next appointment: 9/19/25  Previous refill encounter(s): 5/2/25 #60    Requested Prescriptions     Pending Prescriptions Disp Refills    amLODIPine (NORVASC) 5 MG tablet [Pharmacy Med Name: AMLODIPINE BESYLATE 5MG TABLETS] 180 tablet 3     Sig: TAKE 2 TABLETS BY MOUTH DAILY         For Pharmacy Admin Tracking Only    Program: Medication Refill  CPA in place:    Recommendation Provided To:   Intervention Detail: New Rx: 1, reason: Patient Preference  Intervention Accepted By:   Gap Closed?:    Time Spent (min): 5

## 2025-08-12 RX ORDER — IBUPROFEN 800 MG/1
800 TABLET, FILM COATED ORAL 3 TIMES DAILY PRN
Qty: 60 TABLET | Refills: 1 | Status: SHIPPED | OUTPATIENT
Start: 2025-08-12

## 2025-08-15 ENCOUNTER — TELEPHONE (OUTPATIENT)
Age: 80
End: 2025-08-15

## 2025-08-20 RX ORDER — HYDROCHLOROTHIAZIDE 12.5 MG/1
25 TABLET ORAL DAILY
Qty: 180 TABLET | Refills: 3 | Status: SHIPPED | OUTPATIENT
Start: 2025-08-20

## (undated) DEVICE — SUTURE MCRYL SZ 4-0 L27IN ABSRB UD L19MM PS-2 1/2 CIR PRIM Y426H

## (undated) DEVICE — NEEDLE HYPO 22GA L1.5IN BLK S STL HUB POLYPR SHLD REG BVL

## (undated) DEVICE — SUTURE VCRL SZ 3-0 L27IN ABSRB UD L26MM SH 1/2 CIR J416H

## (undated) DEVICE — INTENDED FOR TISSUE SEPARATION, AND OTHER PROCEDURES THAT REQUIRE A SHARP SURGICAL BLADE TO PUNCTURE OR CUT.: Brand: BARD-PARKER ® CARBON RIB-BACK BLADES

## (undated) DEVICE — SYR 10ML LUER LOK 1/5ML GRAD --

## (undated) DEVICE — SOLUTION IRRIG 1000ML 0.9% SOD CHL USP POUR PLAS BTL

## (undated) DEVICE — DRAPE,REIN 53X77,STERILE: Brand: MEDLINE

## (undated) DEVICE — SMOKE EVACUATION TUBING WITH 8 IN INTEGRAL WAND AND SPONGE GUARD: Brand: BUFFALO FILTER

## (undated) DEVICE — BASIN ST MAJOR-NO CAUTERY: Brand: MEDLINE INDUSTRIES, INC.

## (undated) DEVICE — INSULATED BLADE ELECTRODE: Brand: EDGE

## (undated) DEVICE — PACK,BASIC,SIRUS,V: Brand: MEDLINE

## (undated) DEVICE — TOWEL SURG W17XL27IN STD BLU COT NONFENESTRATED PREWASHED

## (undated) DEVICE — GARMENT,MEDLINE,DVT,INT,CALF,MED, GEN2: Brand: MEDLINE

## (undated) DEVICE — GOWN,NON-REINFORCED,XXL: Brand: MEDLINE

## (undated) DEVICE — DRAPE,CHEST,FENES,15X10,STERIL: Brand: MEDLINE

## (undated) DEVICE — PREP SKN CHLRAPRP APL 26ML STR --

## (undated) DEVICE — ADHESIVE SKIN CLOSURE HI VISC MIC 0.5 CC PREMIERPRO EXOFIN

## (undated) DEVICE — REM POLYHESIVE ADULT PATIENT RETURN ELECTRODE: Brand: VALLEYLAB

## (undated) DEVICE — SUT SLK 2-0SH 30IN BLK --

## (undated) DEVICE — GLOVE ORANGE PI 7 1/2   MSG9075

## (undated) DEVICE — HANDLE LT SNAP ON ULT DURABLE LENS FOR TRUMPF ALC DISPOSABLE

## (undated) DEVICE — PENCIL SMK EVAC L10FT DIA95MM TBNG NONSTICK W ADPT TO 22MM